# Patient Record
Sex: MALE | Race: WHITE | NOT HISPANIC OR LATINO | Employment: OTHER | URBAN - METROPOLITAN AREA
[De-identification: names, ages, dates, MRNs, and addresses within clinical notes are randomized per-mention and may not be internally consistent; named-entity substitution may affect disease eponyms.]

---

## 2023-12-14 ENCOUNTER — ANCILLARY PROCEDURE (OUTPATIENT)
Dept: RADIOLOGY | Facility: CLINIC | Age: 88
End: 2023-12-14
Payer: MEDICARE

## 2023-12-14 DIAGNOSIS — R20.2 PARESTHESIA OF SKIN: ICD-10-CM

## 2023-12-14 DIAGNOSIS — R41.3 OTHER AMNESIA: ICD-10-CM

## 2023-12-14 DIAGNOSIS — R26.89 OTHER ABNORMALITIES OF GAIT AND MOBILITY: ICD-10-CM

## 2023-12-14 DIAGNOSIS — G91.2 (IDIOPATHIC) NORMAL PRESSURE HYDROCEPHALUS (MULTI): ICD-10-CM

## 2024-01-23 DIAGNOSIS — R26.89 OTHER ABNORMALITIES OF GAIT AND MOBILITY: ICD-10-CM

## 2024-01-23 DIAGNOSIS — R41.3 OTHER AMNESIA: ICD-10-CM

## 2024-01-23 DIAGNOSIS — R20.2 PARESTHESIA OF SKIN: Primary | ICD-10-CM

## 2024-01-23 DIAGNOSIS — G91.2 (IDIOPATHIC) NORMAL PRESSURE HYDROCEPHALUS (MULTI): ICD-10-CM

## 2024-02-03 ENCOUNTER — HOSPITAL ENCOUNTER (EMERGENCY)
Facility: HOSPITAL | Age: 89
Discharge: HOME | End: 2024-02-04
Attending: EMERGENCY MEDICINE
Payer: MEDICARE

## 2024-02-03 ENCOUNTER — APPOINTMENT (OUTPATIENT)
Dept: RADIOLOGY | Facility: HOSPITAL | Age: 89
End: 2024-02-03
Payer: MEDICARE

## 2024-02-03 DIAGNOSIS — S09.90XA CLOSED HEAD INJURY, INITIAL ENCOUNTER: Primary | ICD-10-CM

## 2024-02-03 PROCEDURE — 70450 CT HEAD/BRAIN W/O DYE: CPT

## 2024-02-03 PROCEDURE — 99284 EMERGENCY DEPT VISIT MOD MDM: CPT | Mod: 25 | Performed by: EMERGENCY MEDICINE

## 2024-02-03 PROCEDURE — 70450 CT HEAD/BRAIN W/O DYE: CPT | Mod: FOREIGN READ | Performed by: RADIOLOGY

## 2024-02-03 RX ORDER — ONDANSETRON HYDROCHLORIDE 2 MG/ML
4 INJECTION, SOLUTION INTRAVENOUS ONCE
Status: DISCONTINUED | OUTPATIENT
Start: 2024-02-03 | End: 2024-02-03

## 2024-02-03 RX ORDER — ONDANSETRON 4 MG/1
4 TABLET, ORALLY DISINTEGRATING ORAL ONCE
Status: COMPLETED | OUTPATIENT
Start: 2024-02-04 | End: 2024-02-03

## 2024-02-03 RX ADMIN — ONDANSETRON 4 MG: 4 TABLET, ORALLY DISINTEGRATING ORAL at 23:57

## 2024-02-03 ASSESSMENT — PAIN - FUNCTIONAL ASSESSMENT: PAIN_FUNCTIONAL_ASSESSMENT: 0-10

## 2024-02-03 ASSESSMENT — COLUMBIA-SUICIDE SEVERITY RATING SCALE - C-SSRS
6. HAVE YOU EVER DONE ANYTHING, STARTED TO DO ANYTHING, OR PREPARED TO DO ANYTHING TO END YOUR LIFE?: NO
1. IN THE PAST MONTH, HAVE YOU WISHED YOU WERE DEAD OR WISHED YOU COULD GO TO SLEEP AND NOT WAKE UP?: NO
2. HAVE YOU ACTUALLY HAD ANY THOUGHTS OF KILLING YOURSELF?: NO

## 2024-02-03 ASSESSMENT — PAIN SCALES - GENERAL: PAINLEVEL_OUTOF10: 2

## 2024-02-03 ASSESSMENT — LIFESTYLE VARIABLES
EVER FELT BAD OR GUILTY ABOUT YOUR DRINKING: NO
EVER HAD A DRINK FIRST THING IN THE MORNING TO STEADY YOUR NERVES TO GET RID OF A HANGOVER: NO
HAVE PEOPLE ANNOYED YOU BY CRITICIZING YOUR DRINKING: NO
HAVE YOU EVER FELT YOU SHOULD CUT DOWN ON YOUR DRINKING: NO

## 2024-02-04 VITALS
OXYGEN SATURATION: 94 % | TEMPERATURE: 98.6 F | SYSTOLIC BLOOD PRESSURE: 141 MMHG | BODY MASS INDEX: 25.77 KG/M2 | DIASTOLIC BLOOD PRESSURE: 70 MMHG | HEIGHT: 70 IN | RESPIRATION RATE: 18 BRPM | WEIGHT: 180 LBS | HEART RATE: 68 BPM

## 2024-02-04 PROCEDURE — 2500000005 HC RX 250 GENERAL PHARMACY W/O HCPCS: Performed by: EMERGENCY MEDICINE

## 2024-02-04 RX ORDER — ONDANSETRON 4 MG/1
4 TABLET, ORALLY DISINTEGRATING ORAL EVERY 8 HOURS PRN
Qty: 20 TABLET | Refills: 0 | Status: SHIPPED | OUTPATIENT
Start: 2024-02-04 | End: 2024-02-11

## 2024-02-04 NOTE — CARE PLAN
"Received a message on work cell from 2:30 am from [facility] concerning this pt and concerned that the family may not have everything they need at home.  Phoned pts wife Virgen Bhatia who said that they have everything they need; she is calling Dr. Pacheco's office on Monday for a follow up appointment.  [facility] is also [facility] and he sees a physician at the [facility] facility; informed Virgen that her  may be eligible for at home services from the [facility]; she said that she has been told that in the past; encourage Virgen to call [facility] and request [facility] services for bathing ect. She verbalized understanding and said that she will call Dr[facility]co's [facility] first then the [facility].  Asked Virgen if she and Enmanuel have Children, she does have children but she does not want to bother them because they \"work so hard\", but her son who is the  POA comes over to the house.  Will call [facility] for a well check.  11:48 Phoned [facility], gave info; someone from [facility] to call back.--Also gave pts name, wifes name, address and their phone number and requested a well check.    "

## 2024-02-04 NOTE — ED PROVIDER NOTES
HPI   Chief Complaint   Patient presents with    Headache     Patient fell 2 days ago unsure if hit head but has bad headache       Patient presents the emergency department today with complaints of having nausea and vomiting as per staff from the nursing home.  The patient had fallen approximately 3 days ago, and had a minor head injury.  Patient himself does suffer from dementia is a very poor historian the present time.  The patient denies any head pain.  Patient denies neck pain.  Patient states that he just feels sick to his stomach.  Patient has no visual disturbances.  Patient has no focal weakness.  Patient has no other associated symptoms      No data recorded                Patient History   Past Medical History:   Diagnosis Date    Benign prostatic hyperplasia without lower urinary tract symptoms     Enlarged prostate    Personal history of other diseases of the musculoskeletal system and connective tissue     History of arthritis     Past Surgical History:   Procedure Laterality Date    OTHER SURGICAL HISTORY  07/22/2019    No history of surgery     No family history on file.  Social History     Tobacco Use    Smoking status: Not on file    Smokeless tobacco: Not on file   Substance Use Topics    Alcohol use: Not on file    Drug use: Not on file       Physical Exam   ED Triage Vitals [02/03/24 2329]   Temperature Heart Rate Respirations BP   37 °C (98.6 °F) 68 18 141/70      Pulse Ox Temp Source Heart Rate Source Patient Position   94 % Oral Monitor --      BP Location FiO2 (%)     -- --       Physical Exam  Vitals and nursing note reviewed.   Constitutional:       General: He is not in acute distress.     Appearance: He is well-developed.   HENT:      Head: Normocephalic and atraumatic.   Eyes:      Conjunctiva/sclera: Conjunctivae normal.   Cardiovascular:      Rate and Rhythm: Normal rate and regular rhythm.      Heart sounds: No murmur heard.  Pulmonary:      Effort: Pulmonary effort is normal. No  respiratory distress.      Breath sounds: Normal breath sounds.   Abdominal:      Palpations: Abdomen is soft.      Tenderness: There is no abdominal tenderness.   Musculoskeletal:         General: No swelling.      Cervical back: Neck supple.   Skin:     General: Skin is warm and dry.      Capillary Refill: Capillary refill takes less than 2 seconds.   Neurological:      Mental Status: He is alert.   Psychiatric:         Mood and Affect: Mood normal.         ED Course & [facility]   ED Course as of 02/04/24 0047   Sun Feb 04, 2024 0047 Patient does not have any significant maladies on the CT scan, so I do feel the patient can be safely discharged and sent back to his nursing home. [FR]      ED Course User Index  [FR] Fredrick Garza DO         Diagnoses as of 02/04/24 0047   Closed head injury, initial encounter       Medical Decision Making  After my initial evaluation, the patient will go for CT scan of the brain.  The patient will also be given medication to try to help with his nausea.  The patient otherwise has a very benign physical examination.    Amount and/or Complexity of Data Reviewed  Radiology:  Decision-making details documented in ED Course.  ECG/medicine tests:  Decision-making details documented in ED Course.        Procedure  Procedures     Fredrick Garza DO  02/04/24 0048

## 2024-02-08 ENCOUNTER — LAB (OUTPATIENT)
Dept: LAB | Facility: LAB | Age: 89
End: 2024-02-08
Payer: MEDICARE

## 2024-02-08 DIAGNOSIS — R41.3 OTHER AMNESIA: ICD-10-CM

## 2024-02-08 DIAGNOSIS — G91.2 (IDIOPATHIC) NORMAL PRESSURE HYDROCEPHALUS (MULTI): ICD-10-CM

## 2024-02-08 DIAGNOSIS — R20.2 PARESTHESIA OF SKIN: ICD-10-CM

## 2024-02-08 DIAGNOSIS — R26.89 OTHER ABNORMALITIES OF GAIT AND MOBILITY: ICD-10-CM

## 2024-02-08 LAB
CK SERPL-CCNC: 43 U/L (ref 24–195)
CRP SERPL-MCNC: <0.3 MG/DL (ref 0–2)
ERYTHROCYTE [SEDIMENTATION RATE] IN BLOOD BY WESTERGREN METHOD: <1 MM/H (ref 0–20)
VIT B12 SERPL-MCNC: 361 PG/ML (ref 211–946)

## 2024-02-08 PROCEDURE — 85652 RBC SED RATE AUTOMATED: CPT

## 2024-02-08 PROCEDURE — 82550 ASSAY OF CK (CPK): CPT

## 2024-02-08 PROCEDURE — 86140 C-REACTIVE PROTEIN: CPT

## 2024-02-08 PROCEDURE — 82607 VITAMIN B-12: CPT

## 2024-02-08 PROCEDURE — 36415 COLL VENOUS BLD VENIPUNCTURE: CPT

## 2024-02-13 ENCOUNTER — HOSPITAL ENCOUNTER (OUTPATIENT)
Dept: RADIOLOGY | Facility: HOSPITAL | Age: 89
Discharge: HOME | End: 2024-02-13
Payer: MEDICARE

## 2024-02-13 PROCEDURE — 70551 MRI BRAIN STEM W/O DYE: CPT

## 2024-02-22 ENCOUNTER — APPOINTMENT (OUTPATIENT)
Dept: RADIOLOGY | Facility: HOSPITAL | Age: 89
End: 2024-02-22
Payer: MEDICARE

## 2024-03-20 ENCOUNTER — APPOINTMENT (OUTPATIENT)
Dept: CARDIOLOGY | Facility: CLINIC | Age: 89
End: 2024-03-20
Payer: MEDICARE

## 2024-04-29 ENCOUNTER — APPOINTMENT (OUTPATIENT)
Dept: CARDIOLOGY | Facility: CLINIC | Age: 89
End: 2024-04-29
Payer: MEDICARE

## 2024-06-24 ENCOUNTER — EVALUATION (OUTPATIENT)
Dept: PHYSICAL THERAPY | Facility: CLINIC | Age: 89
End: 2024-06-24
Payer: MEDICARE

## 2024-06-24 ENCOUNTER — TELEPHONE (OUTPATIENT)
Dept: PHYSICAL THERAPY | Facility: CLINIC | Age: 89
End: 2024-06-24
Payer: MEDICARE

## 2024-06-24 DIAGNOSIS — R26.0 ATAXIC GAIT: ICD-10-CM

## 2024-06-24 PROCEDURE — 97162 PT EVAL MOD COMPLEX 30 MIN: CPT | Mod: GP | Performed by: PHYSICAL THERAPIST

## 2024-06-24 ASSESSMENT — ENCOUNTER SYMPTOMS
OCCASIONAL FEELINGS OF UNSTEADINESS: 1
DEPRESSION: 0
LOSS OF SENSATION IN FEET: 0

## 2024-06-24 NOTE — PROGRESS NOTES
"Physical Therapy Evaluation and Treatment      Patient Name: Enmanuel Bhatia  MRN: [id number]  Today's Date: 6/24/2024  Time Calculation  Start Time: 1644  Stop Time: 1720  Time Calculation (min): 36 min      Insurance:  Visit number: 1 of 10  Authorization info: EVAL only   Insurance Type: Payor: [employer] / Plan: [employer] MEDICARE ADVANTAGE / Product Type: *No Product type* /     Current Problem:   1. Ataxic gait  Referral to Physical Therapy    Follow Up In Physical Therapy          Subjective    General:  Pt is familiar to therapist and returns to therapy for similar issue. He presents with spouse. He has supposedly gone down hill with his walking. He reports not walking much, only a little bit in the house. Finds he is fatigue out a lot more. Contributes everything to \"old age.\" Currently using transport chair for most transportation. Has rollator. Does not do exercises at home. Dementia with poor historian. Had stay in SNF during winter. Spouse does most tasks for patient.       Precautions: Falls  Pain: 0/10      Objective   ROM   Bilateral LE AROM: WNL, but high level of difficulty with bilateral hip flexion      MMT   Bilateral LE strength:  Hip flex 4-/5  Knee flex 4-/5  Knee ext 4-/5      Palpation   No tenderness    Flexibility   Significant tightness to bilateral hip flexors and hamstrings      Transfers   Requires heavy bilateral UE support for sit to stand     Gait   Ambulates with decreased william using rollator at min A. Only able to ambulate 10 feet. Significant unsteadiness. Minimal bilateral hip flexion and foot clearance. Shuffling gait pattern.     Stairs   Unable to perform in clinic.     Balance   ROMBERG: unable to perform safely    Outcome Measures:  LEFS: 6/80    Treatments:  Therapeutic Exercise:  Access Code: [id number]   LAQ  Seated march  Seated heel raise      Assessment   Assessment:   Pt is a 89 y.o. male with ataxia and difficulty walking. Pt with significant gait deficits, " impaired balance, decreased strength, abnormal posture, reduced endurance, and flexibility restrictions. Pt will benefit from skilled PT to address the above deficits for improvement in functional activities.     *At this time I highly suggest patient would benefit from home physical therapy. Pt spouse would like to bring him outpatient however.*      Moderate complexity due to patient's clinical presentation being evolving with changing characteristics, with comorbidities/complexities to include hydrocephaleus, dementia, OA, all of which may negatively impact rehab tolerance and progression.     Plan:   Treatment/Interventions: Education/ Instruction, Gait training, Manual therapy, Neuromuscular re-education, Self care/ home management, Therapeutic activities, Therapeutic exercises  PT Plan: Skilled PT  PT Frequency: 1 time per week  Duration: up to 9 more visits  Number of Treatments Authorized: EVAL only  Rehab Potential: Poor  Plan of Care Agreement: Patient, Other (comment) (spouse)      Goals:   Active       Mobility       Goal 1       Start:  06/24/24    Expected End:  09/22/24       Pt will improve bilat LE strength to 4+/5 to improve ADLs.         Goal 2       Start:  06/24/24    Expected End:  09/22/24       Pt will ambulate 40 feet with rollator at mod I safely to improve ADLs.         Goal 3       Start:  06/24/24    Expected End:  09/22/24       Pt will perform ROMBERG for 60 seconds to improve balance and reduce risk of falls.

## 2024-07-11 ENCOUNTER — TREATMENT (OUTPATIENT)
Dept: PHYSICAL THERAPY | Facility: CLINIC | Age: 89
End: 2024-07-11
Payer: MEDICARE

## 2024-07-11 DIAGNOSIS — R26.0 ATAXIC GAIT: ICD-10-CM

## 2024-07-11 ASSESSMENT — PAIN SCALES - GENERAL: PAINLEVEL_OUTOF10: 0 - NO PAIN

## 2024-07-11 NOTE — PROGRESS NOTES
"Physical Therapy Treatment    Patient Name: Enmanuel Bhatia  MRN: [id number]  Today's Date: 7/11/2024  Time Calculation  Start Time: 1515  Stop Time: 1600  Time Calculation (min): 45 min    Insurance:  Visit number: 2 of 10  Authorization info: EVAL ONLY - [employer] - AUTH THRU [employer] / $35 COPAY / OOP $4200 - $615 met   Insurance Type: Payor: [employer] MEDICARE / Plan: [facility] MEDICARE ADVANTAGE / Product Type: *No Product type* /     Current Problem   1. Ataxic gait  Follow Up In Physical Therapy          Subjective   Pt reports fatigue during standing ther ex.    General   Reason for Referral: Ataxic gait  Referred By: Grant Pacheco MD  Precautions:  Precautions  Precautions Comment: Falls risk  Pain   0-10 (Numeric) Pain Score: 0 - No pain  Post Treatment Pain Level 0/10    Objective   Pt requires intermittent to frequent UE support during standing there ex, frequent cues for exercise performance, unsteady gait with rollater.    Treatments:  Therapeutic Exercise:  Therapeutic Exercise  Therapeutic Exercise Performed: Yes  Therapeutic Exercise Activity 1: Nustep L4, 8'  Therapeutic Exercise Activity 2: Standing PF 2x10  Therapeutic Exercise Activity 3: Standing DF 2x10  Therapeutic Exercise Activity 4: Minisquats 2x10  Therapeutic Exercise Activity 5: Sidestepping in // bars 3 laps  Therapeutic Exercise Activity 6: Standing Hip flexion with PTB above knees 2x10 L/R each  Therapeutic Exercise Activity 7: Standing hip extension with PTB above knees 2x10 L/R each  Therapeutic Exercise Activity 8: Standing abduction with PTB above knees 2x10 L/R each  Therapeutic Exercise Activity 9: Seated hip flexor stretch 20\"x3 L/R each    Gait:  Ambulation/Gait Training  Ambulation/Gait Training Performed: Yes  Ambulation/Gait Training 1  Surface 1: Level tile  Device 1: Rollator  Gait Support Devices: Gait belt  Assistance 1: Contact guard  Quality of Gait 1: Forward flexed posture, Listing, Decreased step length, Diminished heel " strike       Assessment   Assessment:   PT Assessment  PT Assessment Results: Decreased strength, Decreased range of motion, Decreased endurance, Impaired balance, Decreased mobility, Decreased coordination, Impaired judgement, Decreased safety awareness, Decreased cognition  Rehab Prognosis: Poor  Evaluation/Treatment Tolerance: Patient limited by fatigue  Assessment Comment: Pt challenged with standing ther ex activites, unsteady with intermittent to frequent UE support.    Plan:   OP PT Plan  Treatment/Interventions: Education/ Instruction, Gait training, Manual therapy, Neuromuscular re-education, Self care/ home management, Therapeutic activities, Therapeutic exercises  PT Plan: Skilled PT  PT Frequency: 1 time per week  Duration: up to 9 more visits  Number of Treatments Authorized: EVAL only  Rehab Potential: Poor  Plan of Care Agreement: Patient, Other (comment) (spouse)    OP EDUCATION:  Outpatient Education  Individual(s) Educated: Patient, Spouse  Education Provided: Body Mechanics, Home Exercise Program  Patient/Caregiver Demonstrated Understanding: yes  Patient Response to Education: Patient/Caregiver Verbalized Understanding of Information    Goals:   Active       Mobility       Goal 1       Start:  06/24/24    Expected End:  09/22/24       Pt will improve bilat LE strength to 4+/5 to improve ADLs.         Goal 2       Start:  06/24/24    Expected End:  09/22/24       Pt will ambulate 40 feet with rollator at mod I safely to improve ADLs.         Goal 3       Start:  06/24/24    Expected End:  09/22/24       Pt will perform ROMBERG for 60 seconds to improve balance and reduce risk of falls.

## 2024-07-17 ENCOUNTER — APPOINTMENT (OUTPATIENT)
Dept: PHYSICAL THERAPY | Facility: CLINIC | Age: 89
End: 2024-07-17
Payer: MEDICARE

## 2024-07-17 ENCOUNTER — DOCUMENTATION (OUTPATIENT)
Dept: PHYSICAL THERAPY | Facility: CLINIC | Age: 89
End: 2024-07-17
Payer: MEDICARE

## 2024-07-17 NOTE — PROGRESS NOTES
Physical Therapy                 Therapy Communication Note    Patient Name: Enmanuel Bhatia  MRN: [id number]  Today's Date: 7/17/2024     Discipline: Physical Therapy    Missed Visit Reason:      Missed Time: Cancel    Comment: Pt cancels appt.

## 2024-07-24 ENCOUNTER — APPOINTMENT (OUTPATIENT)
Dept: PHYSICAL THERAPY | Facility: CLINIC | Age: 89
End: 2024-07-24
Payer: MEDICARE

## 2024-07-24 ENCOUNTER — DOCUMENTATION (OUTPATIENT)
Dept: PHYSICAL THERAPY | Facility: CLINIC | Age: 89
End: 2024-07-24
Payer: MEDICARE

## 2024-07-24 NOTE — PROGRESS NOTES
Physical Therapy                 Therapy Communication Note    Patient Name: Enmanuel Bhatia  MRN: [id number]  Today's Date: 7/24/2024     Discipline: Physical Therapy    Missed Visit Reason:      Missed Time: Cancel    Comment: Pt came yesterday with spouse. Spouse attempted to get patient out of house and to come today but reported having difficulty with managing due to weather.

## 2024-07-31 ENCOUNTER — DOCUMENTATION (OUTPATIENT)
Dept: PHYSICAL THERAPY | Facility: CLINIC | Age: 89
End: 2024-07-31
Payer: MEDICARE

## 2024-07-31 ENCOUNTER — APPOINTMENT (OUTPATIENT)
Dept: PHYSICAL THERAPY | Facility: CLINIC | Age: 89
End: 2024-07-31
Payer: MEDICARE

## 2024-09-25 ENCOUNTER — DOCUMENTATION (OUTPATIENT)
Dept: PHYSICAL THERAPY | Facility: CLINIC | Age: 89
End: 2024-09-25
Payer: MEDICARE

## 2024-09-25 NOTE — PROGRESS NOTES
Physical Therapy    Discharge Summary    Name: Enmanuel Bhatia  MRN: [id number]  : 1935  Date: 24    Discharge Summary: PT    Discharge Information: Date of evaluation 2024 and Number of attended visits 2    Therapy Summary: Pt only attended one follow-up visit after initial eval.    Discharge Status: Return to MD     Rehab Discharge Reason: Failed to schedule and/or keep follow-up appointment(s)

## 2025-01-21 ENCOUNTER — HOME HEALTH ADMISSION (OUTPATIENT)
Dept: HOME HEALTH SERVICES | Facility: HOME HEALTH | Age: OVER 89
End: 2025-01-21
Payer: MEDICARE

## 2025-01-21 ENCOUNTER — DOCUMENTATION (OUTPATIENT)
Dept: HOME HEALTH SERVICES | Facility: HOME HEALTH | Age: OVER 89
End: 2025-01-21
Payer: MEDICARE

## 2025-01-21 NOTE — HH CARE COORDINATION
Home Care received a Referral for Physical Therapy. We have processed the referral for a Start of Care on 1/17/25.     If you have any questions or concerns, please feel free to contact us at [phone]. Follow the prompts, enter your five digit zip code, and you will be directed to your care team on [facility].

## 2025-01-23 ENCOUNTER — HOME CARE VISIT (OUTPATIENT)
Dept: HOME HEALTH SERVICES | Facility: HOME HEALTH | Age: OVER 89
End: 2025-01-23
Payer: MEDICARE

## 2025-01-23 VITALS — HEART RATE: 88 BPM | DIASTOLIC BLOOD PRESSURE: 58 MMHG | TEMPERATURE: 98.4 F | SYSTOLIC BLOOD PRESSURE: 114 MMHG

## 2025-01-23 PROCEDURE — 169592 NO-PAY CLAIM PROCEDURE

## 2025-01-23 PROCEDURE — G0151 HHCP-SERV OF PT,EA 15 MIN: HCPCS | Mod: HHH

## 2025-01-23 SDOH — HEALTH STABILITY: PHYSICAL HEALTH: EXERCISE COMMENTS: RECLINED AP HEEL SLIDES HIP ABD BRIDGING  SITTING MARCHING LAQ X 5 TO 10

## 2025-01-23 ASSESSMENT — ENCOUNTER SYMPTOMS
LOSS OF SENSATION IN FEET: 0
PAIN: 1
DEPRESSION: 1
OCCASIONAL FEELINGS OF UNSTEADINESS: 0

## 2025-01-23 ASSESSMENT — ACTIVITIES OF DAILY LIVING (ADL)
ENTERING_EXITING_HOME: MINIMUM ASSIST
AMBULATION_DISTANCE/DURATION_TOLERATED: 30 FEET
OASIS_M1830: 05

## 2025-01-29 ENCOUNTER — HOME CARE VISIT (OUTPATIENT)
Dept: HOME HEALTH SERVICES | Facility: HOME HEALTH | Age: OVER 89
End: 2025-01-29
Payer: MEDICARE

## 2025-01-30 ENCOUNTER — HOME CARE VISIT (OUTPATIENT)
Dept: HOME HEALTH SERVICES | Facility: HOME HEALTH | Age: OVER 89
End: 2025-01-30
Payer: MEDICARE

## 2025-01-30 VITALS — DIASTOLIC BLOOD PRESSURE: 68 MMHG | TEMPERATURE: 98.5 F | SYSTOLIC BLOOD PRESSURE: 112 MMHG

## 2025-01-30 PROCEDURE — G0151 HHCP-SERV OF PT,EA 15 MIN: HCPCS | Mod: HHH

## 2025-01-30 ASSESSMENT — ENCOUNTER SYMPTOMS: DENIES PAIN: 1

## 2025-02-03 ENCOUNTER — HOSPITAL ENCOUNTER (INPATIENT)
Facility: HOSPITAL | Age: OVER 89
LOS: 1 days | Discharge: NURSING, REHAB OR LONG-TERM CARE | End: 2025-02-08
Attending: EMERGENCY MEDICINE | Admitting: INTERNAL MEDICINE
Payer: MEDICARE

## 2025-02-03 ENCOUNTER — APPOINTMENT (OUTPATIENT)
Dept: RADIOLOGY | Facility: HOSPITAL | Age: OVER 89
End: 2025-02-03
Payer: MEDICARE

## 2025-02-03 ENCOUNTER — APPOINTMENT (OUTPATIENT)
Dept: CARDIOLOGY | Facility: HOSPITAL | Age: OVER 89
End: 2025-02-03
Payer: MEDICARE

## 2025-02-03 ENCOUNTER — HOME CARE VISIT (OUTPATIENT)
Dept: HOME HEALTH SERVICES | Facility: HOME HEALTH | Age: OVER 89
End: 2025-02-03
Payer: MEDICARE

## 2025-02-03 VITALS — HEART RATE: 102 BPM | SYSTOLIC BLOOD PRESSURE: 108 MMHG | TEMPERATURE: 98.9 F | DIASTOLIC BLOOD PRESSURE: 70 MMHG

## 2025-02-03 DIAGNOSIS — R26.81 UNSTEADINESS: ICD-10-CM

## 2025-02-03 DIAGNOSIS — R29.6 RECURRENT FALLS: Primary | ICD-10-CM

## 2025-02-03 PROBLEM — R41.3 MEMORY IMPAIRMENT: Status: ACTIVE | Noted: 2025-02-03

## 2025-02-03 PROBLEM — N40.1 BENIGN PROSTATIC HYPERPLASIA WITH LOWER URINARY TRACT SYMPTOMS: Status: ACTIVE | Noted: 2025-02-03

## 2025-02-03 PROBLEM — Z86.59 HISTORY OF DEPRESSION: Status: ACTIVE | Noted: 2025-02-03
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
## 2025-02-03 PROBLEM — R01.1 MURMUR, CARDIAC: Status: ACTIVE | Noted: 2025-02-03

## 2025-02-03 PROBLEM — N39.46 MIXED STRESS AND URGE URINARY INCONTINENCE: Status: ACTIVE | Noted: 2025-02-03

## 2025-02-03 LAB
ALBUMIN SERPL BCP-MCNC: 3.8 G/DL (ref 3.4–5)
ALP SERPL-CCNC: 73 U/L (ref 33–136)
ALT SERPL W P-5'-P-CCNC: 11 U/L (ref 10–52)
ANION GAP SERPL CALCULATED.3IONS-SCNC: 9 MMOL/L (ref 10–20)
APPEARANCE UR: CLEAR
AST SERPL W P-5'-P-CCNC: 14 U/L (ref 9–39)
BACTERIA #/AREA URNS AUTO: ABNORMAL /HPF
BASOPHILS # BLD AUTO: 0.05 X10*3/UL (ref 0–0.1)
BASOPHILS NFR BLD AUTO: 0.8 %
BILIRUB SERPL-MCNC: 0.5 MG/DL (ref 0–1.2)
BILIRUB UR STRIP.AUTO-MCNC: NEGATIVE MG/DL
BUN SERPL-MCNC: 12 MG/DL (ref 6–23)
CALCIUM SERPL-MCNC: 9.1 MG/DL (ref 8.6–10.3)
CARDIAC TROPONIN I PNL SERPL HS: 5 NG/L (ref 0–20)
CARDIAC TROPONIN I PNL SERPL HS: 6 NG/L (ref 0–20)
CHLORIDE SERPL-SCNC: 101 MMOL/L (ref 98–107)
CK SERPL-CCNC: 72 U/L (ref 0–325)
CO2 SERPL-SCNC: 29 MMOL/L (ref 21–32)
COLOR UR: ABNORMAL
CREAT SERPL-MCNC: 1 MG/DL (ref 0.5–1.3)
EGFRCR SERPLBLD CKD-EPI 2021: 72 ML/MIN/1.73M*2
EOSINOPHIL # BLD AUTO: 0.19 X10*3/UL (ref 0–0.4)
EOSINOPHIL NFR BLD AUTO: 3 %
ERYTHROCYTE [DISTWIDTH] IN BLOOD BY AUTOMATED COUNT: 12.9 % (ref 11.5–14.5)
GLUCOSE SERPL-MCNC: 98 MG/DL (ref 74–99)
GLUCOSE UR STRIP.AUTO-MCNC: NORMAL MG/DL
HCT VFR BLD AUTO: 37.6 % (ref 41–52)
HGB BLD-MCNC: 13 G/DL (ref 13.5–17.5)
HOLD SPECIMEN: NORMAL
IMM GRANULOCYTES # BLD AUTO: 0.04 X10*3/UL (ref 0–0.5)
IMM GRANULOCYTES NFR BLD AUTO: 0.6 % (ref 0–0.9)
KETONES UR STRIP.AUTO-MCNC: NEGATIVE MG/DL
LEUKOCYTE ESTERASE UR QL STRIP.AUTO: ABNORMAL
LYMPHOCYTES # BLD AUTO: 1.31 X10*3/UL (ref 0.8–3)
LYMPHOCYTES NFR BLD AUTO: 21 %
MAGNESIUM SERPL-MCNC: 1.82 MG/DL (ref 1.6–2.4)
MCH RBC QN AUTO: 31.5 PG (ref 26–34)
MCHC RBC AUTO-ENTMCNC: 34.6 G/DL (ref 32–36)
MCV RBC AUTO: 91 FL (ref 80–100)
MONOCYTES # BLD AUTO: 0.46 X10*3/UL (ref 0.05–0.8)
MONOCYTES NFR BLD AUTO: 7.4 %
NEUTROPHILS # BLD AUTO: 4.18 X10*3/UL (ref 1.6–5.5)
NEUTROPHILS NFR BLD AUTO: 67.2 %
NITRITE UR QL STRIP.AUTO: NEGATIVE
NRBC BLD-RTO: 0 /100 WBCS (ref 0–0)
PH UR STRIP.AUTO: 6.5 [PH]
PLATELET # BLD AUTO: 225 X10*3/UL (ref 150–450)
POTASSIUM SERPL-SCNC: 4.1 MMOL/L (ref 3.5–5.3)
PROT SERPL-MCNC: 6 G/DL (ref 6.4–8.2)
PROT UR STRIP.AUTO-MCNC: NEGATIVE MG/DL
RBC # BLD AUTO: 4.13 X10*6/UL (ref 4.5–5.9)
RBC # UR STRIP.AUTO: NEGATIVE /UL
RBC #/AREA URNS AUTO: ABNORMAL /HPF
SODIUM SERPL-SCNC: 135 MMOL/L (ref 136–145)
SP GR UR STRIP.AUTO: 1.01
UROBILINOGEN UR STRIP.AUTO-MCNC: NORMAL MG/DL
WBC # BLD AUTO: 6.2 X10*3/UL (ref 4.4–11.3)
WBC #/AREA URNS AUTO: ABNORMAL /HPF

## 2025-02-03 PROCEDURE — 99285 EMERGENCY DEPT VISIT HI MDM: CPT | Mod: 25 | Performed by: EMERGENCY MEDICINE

## 2025-02-03 PROCEDURE — 99223 1ST HOSP IP/OBS HIGH 75: CPT | Performed by: NURSE PRACTITIONER

## 2025-02-03 PROCEDURE — 70450 CT HEAD/BRAIN W/O DYE: CPT

## 2025-02-03 PROCEDURE — 72125 CT NECK SPINE W/O DYE: CPT

## 2025-02-03 PROCEDURE — 81001 URINALYSIS AUTO W/SCOPE: CPT | Performed by: PHYSICIAN ASSISTANT

## 2025-02-03 PROCEDURE — 72170 X-RAY EXAM OF PELVIS: CPT | Performed by: RADIOLOGY

## 2025-02-03 PROCEDURE — 70450 CT HEAD/BRAIN W/O DYE: CPT | Performed by: RADIOLOGY

## 2025-02-03 PROCEDURE — G0151 HHCP-SERV OF PT,EA 15 MIN: HCPCS | Mod: HHH

## 2025-02-03 PROCEDURE — 71045 X-RAY EXAM CHEST 1 VIEW: CPT

## 2025-02-03 PROCEDURE — 85025 COMPLETE CBC W/AUTO DIFF WBC: CPT | Performed by: PHYSICIAN ASSISTANT

## 2025-02-03 PROCEDURE — 93005 ELECTROCARDIOGRAM TRACING: CPT

## 2025-02-03 PROCEDURE — 36415 COLL VENOUS BLD VENIPUNCTURE: CPT | Performed by: PHYSICIAN ASSISTANT

## 2025-02-03 PROCEDURE — 83735 ASSAY OF MAGNESIUM: CPT | Performed by: PHYSICIAN ASSISTANT

## 2025-02-03 PROCEDURE — 84484 ASSAY OF TROPONIN QUANT: CPT | Performed by: PHYSICIAN ASSISTANT

## 2025-02-03 PROCEDURE — 72125 CT NECK SPINE W/O DYE: CPT | Performed by: RADIOLOGY

## 2025-02-03 PROCEDURE — 87086 URINE CULTURE/COLONY COUNT: CPT | Mod: WESLAB | Performed by: PHYSICIAN ASSISTANT

## 2025-02-03 PROCEDURE — 82550 ASSAY OF CK (CPK): CPT | Performed by: PHYSICIAN ASSISTANT

## 2025-02-03 PROCEDURE — 72170 X-RAY EXAM OF PELVIS: CPT

## 2025-02-03 PROCEDURE — 80053 COMPREHEN METABOLIC PANEL: CPT | Performed by: PHYSICIAN ASSISTANT

## 2025-02-03 PROCEDURE — 71045 X-RAY EXAM CHEST 1 VIEW: CPT | Performed by: RADIOLOGY

## 2025-02-03 PROCEDURE — G0378 HOSPITAL OBSERVATION PER HR: HCPCS

## 2025-02-03 RX ORDER — NYSTATIN 100000 U/G
1 CREAM TOPICAL 2 TIMES DAILY
COMMUNITY

## 2025-02-03 RX ORDER — VIT C/E/ZN/COPPR/LUTEIN/ZEAXAN 250MG-90MG
125 CAPSULE ORAL DAILY
COMMUNITY

## 2025-02-03 RX ORDER — FLUTICASONE PROPIONATE 50 MCG
1 SPRAY, SUSPENSION (ML) NASAL DAILY
COMMUNITY

## 2025-02-03 RX ORDER — GABAPENTIN 300 MG/1
300 CAPSULE ORAL NIGHTLY
COMMUNITY

## 2025-02-03 ASSESSMENT — COLUMBIA-SUICIDE SEVERITY RATING SCALE - C-SSRS
2. HAVE YOU ACTUALLY HAD ANY THOUGHTS OF KILLING YOURSELF?: NO
6. HAVE YOU EVER DONE ANYTHING, STARTED TO DO ANYTHING, OR PREPARED TO DO ANYTHING TO END YOUR LIFE?: NO
1. IN THE PAST MONTH, HAVE YOU WISHED YOU WERE DEAD OR WISHED YOU COULD GO TO SLEEP AND NOT WAKE UP?: NO

## 2025-02-03 ASSESSMENT — ENCOUNTER SYMPTOMS: DENIES PAIN: 1

## 2025-02-03 NOTE — ED TRIAGE NOTES
After home health aide left house for the night patient tried to ambulate and fell, lives at home with wife and is concern that patient will fall again and he is unable to care for himself.

## 2025-02-03 NOTE — ED PROVIDER NOTES
HPI   Chief Complaint   Patient presents with    Fall       HPI    Patient is an 89-year-old male presenting from home via EMS for evaluation after a fall.  According to EMS, patient does have physical therapy and home health aide come to his house to aid with his ambulation as he has known difficulties with ambulation.  EMS stated that today the patient fell after his PT appointment and his wife was unable to get him up off of the ground.  On questioning, the patient does not believe that he hit his head on his fall.  He denies loss of consciousness or blood thinner use.  He denies any complaints of pain, including chest pain, shortness of breath, abdominal pain, nausea, vomiting, diarrhea or constipation.  No dysuria or hematuria.    Patient History   Past Medical History:   Diagnosis Date    Benign prostatic hyperplasia without lower urinary tract symptoms     Enlarged prostate    Personal history of other diseases of the musculoskeletal system and connective tissue     History of arthritis     Past Surgical History:   Procedure Laterality Date    OTHER SURGICAL HISTORY  07/22/2019    No history of surgery     No family history on file.  Social History     Tobacco Use    Smoking status: Not on file    Smokeless tobacco: Not on file   Substance Use Topics    Alcohol use: Not on file    Drug use: Not on file       Physical Exam   ED Triage Vitals   Temp Pulse Resp BP   -- -- -- --      SpO2 Temp src Heart Rate Source Patient Position   -- -- -- --      BP Location FiO2 (%)     -- --       Physical Exam  Vitals and nursing note reviewed.   Constitutional:       Appearance: Normal appearance.   HENT:      Head: Normocephalic and atraumatic.   Eyes:      Extraocular Movements: Extraocular movements intact.      Pupils: Pupils are equal, round, and reactive to light.   Neck:      Comments: No C-spine tenderness, palpable deformities or step-offs.  Cardiovascular:      Rate and Rhythm: Normal rate and regular rhythm.    Pulmonary:      Effort: Pulmonary effort is normal.      Breath sounds: Normal breath sounds.   Abdominal:      General: Abdomen is flat. Bowel sounds are normal.      Palpations: Abdomen is soft.   Musculoskeletal:         General: No swelling, tenderness, deformity or signs of injury.      Cervical back: Normal range of motion and neck supple.   Skin:     General: Skin is warm and dry.   Neurological:      Mental Status: He is alert.           ED Course & MDM   ED Course as of 02/03/25 2121 Mon Feb 03, 2025 1811 EKG personally interpreted by me performed at 1715  Normal sinus rhythm with left axis deviation ventricular 88 normal intervals no acute ischemic changes [EF]      ED Course User Index  [EF] Haley Santiago, DO         Diagnoses as of 02/03/25 2121   Recurrent falls   Unsteadiness                 No data recorded     [place] Coma Scale Score: 15 (02/03/25 1704 : Latosha Wiggins RN)                           Medical Decision Making  Parts of this chart have been completed using voice recognition software. Please excuse any errors of transcription. Despite the medical decision making time stamp above-my medical decision making has taken place during the patient's entire visit. My thought process and reason for plan has been formulated from the time that I saw the patient until the time of disposition and is not specific to one specific moment during their visit and furthermore my MDM encompasses this entire chart and not only this text box.      HPI: Detailed above.    Exam: A medically appropriate exam performed, outlined above, given the known history and presentation.    History obtained from: Patient    Social Determinants of Health considered during this visit: Lives at home with wife    EKG interpreted by my attending physician, reviewed by myself.    Labs Reviewed   CBC WITH AUTO DIFFERENTIAL - Abnormal       Result Value    WBC 6.2      nRBC 0.0      RBC 4.13 (*)     Hemoglobin 13.0 (*)      Hematocrit 37.6 (*)     MCV 91      MCH 31.5      MCHC 34.6      RDW 12.9      Platelets 225      Neutrophils % 67.2      Immature Granulocytes %, Automated 0.6      Lymphocytes % 21.0      Monocytes % 7.4      Eosinophils % 3.0      Basophils % 0.8      Neutrophils Absolute 4.18      Immature Granulocytes Absolute, Automated 0.04      Lymphocytes Absolute 1.31      Monocytes Absolute 0.46      Eosinophils Absolute 0.19      Basophils Absolute 0.05     COMPREHENSIVE METABOLIC PANEL - Abnormal    Glucose 98      Sodium 135 (*)     Potassium 4.1      Chloride 101      Bicarbonate 29      Anion Gap 9 (*)     Urea Nitrogen 12      Creatinine 1.00      eGFR 72      Calcium 9.1      Albumin 3.8      Alkaline Phosphatase 73      Total Protein 6.0 (*)     AST 14      Bilirubin, Total 0.5      ALT 11     URINALYSIS WITH REFLEX CULTURE AND MICROSCOPIC - Abnormal    Color, Urine Light-Yellow      Appearance, Urine Clear      Specific Gravity, Urine 1.006      pH, Urine 6.5      Protein, Urine NEGATIVE      Glucose, Urine Normal      Blood, Urine NEGATIVE      Ketones, Urine NEGATIVE      Bilirubin, Urine NEGATIVE      Urobilinogen, Urine Normal      Nitrite, Urine NEGATIVE      Leukocyte Esterase, Urine 75 Manjinder/uL (*)    MICROSCOPIC ONLY, URINE - Abnormal    WBC, Urine 6-10 (*)     RBC, Urine NONE      Bacteria, Urine 1+ (*)    MAGNESIUM - Normal    Magnesium 1.82     CREATINE KINASE - Normal    Creatine Kinase 72     SERIAL TROPONIN-INITIAL - Normal    Troponin I, High Sensitivity 5      Narrative:     Less than 99th percentile of normal range cutoff-  Female and children under 18 years old <14 ng/L; Male <21 ng/L: Negative  Repeat testing should be performed if clinically indicated.     Female and children under 18 years old 14-50 ng/L; Male 21-50 ng/L:  Consistent with possible cardiac damage and possible increased clinical   risk. Serial measurements may help to assess extent of myocardial damage.     >50 ng/L: Consistent  with cardiac damage, increased clinical risk and  myocardial infarction. Serial measurements may help assess extent of   myocardial damage.      NOTE: Children less than 1 year old may have higher baseline troponin   levels and results should be interpreted in conjunction with the overall   clinical context.     NOTE: Troponin I testing is performed using a different   testing methodology at [facility] than at other   [facility]. Direct result comparisons should only   be made within the same method.   URINE CULTURE   TROPONIN SERIES- (INITIAL, 1 HR)    Narrative:     The following orders were created for panel order Troponin Series, (0, 1 HR).  Procedure                               Abnormality         Status                     ---------                               -----------         ------                     Troponin I, High Sensiti...[[id number]]  Normal              Final result               Troponin, High Sensitivi...[[id number]]                                                   Please view results for these tests on the individual orders.   URINALYSIS WITH REFLEX CULTURE AND MICROSCOPIC    Narrative:     The following orders were created for panel order Urinalysis with Reflex Culture and Microscopic.  Procedure                               Abnormality         Status                     ---------                               -----------         ------                     Urinalysis with Reflex C...[[id number]]  Abnormal            Final result               Extra Urine Gray Tube[[id number]]                            In process                   Please view results for these tests on the individual orders.   EXTRA URINE GRAY TUBE   SERIAL TROPONIN, 1 HOUR     CT head wo IV contrast   Final Result   No CT evidence of acute intracranial injury.                  MACRO:   None             Signed by: Derick Dallas 2/3/2025 6:33 PM   Dictation workstation:   [id number]      CT cervical  spine wo IV contrast   Final Result   Multilevel spondylosis without acute osseous abnormality of the   cervical spine.        MACRO:   None        Signed by: Derick Dallas 2/3/2025 6:35 PM   Dictation workstation:   [id number]      XR pelvis 1-2 views   Final Result   No acute abnormality in the pelvis.        MACRO:   None        Signed by: Kailash Haley 2/3/2025 5:59 PM   Dictation workstation:   [id number]      XR chest 1 view   Final Result   1.  No evidence of acute cardiopulmonary process.                  MACRO:   None        Signed by: Kailash Haley 2/3/2025 5:59 PM   Dictation workstation:   [id number]        Medications - No data to display    Differential diagnoses considered for this visit include: Acute intracranial processes versus cervical spine instability versus failure to thrive versus rhabdomyolysis versus urinary tract infection versus electrolyte imbalance versus metabolic disturbance    Considerations/further MDM:    Patient is an 89-year-old male coming from home for evaluation after a mechanical fall when his wife could not get him up off of the floor.  Vital signs are hemodynamically stable.  On physical exam, patient is spontaneously moving all of his extremities in the bed and is not complaining of any pain.  There are no neurologic deficits or outward signs of trauma.  Patient did appear to be a poor historian.  CT head and cervical spine will be obtained for diagnostic imaging with x-ray of the pelvis and chest.  Lab work and urinalysis would be obtained to rule out any acute abnormalities.    CBC shows no leukocytosis or anemia.  MP demonstrates balanced electrolytes with normal liver and kidney function.  Noted troponin of 5.  Urinalysis negative for infection.  CK within normal limits.  Magnesium within normal limits. CT head without contrast shows no evidence of acute intracranial injury.  CT cervical spine shows multilevel spondylosis without acute osseous abnormality.   X-ray of the chest shows no evidence of acute cardiopulmonary process.  X-ray of the pelvis shows no acute abnormality.  Results were discussed with the patient at the bedside.  Ambulation trial was attempted and failed.  Patient could barely stand without becoming unsteady and needing to sit.  I did discuss with the patient that I would recommend admittance to the hospital as he was not able to ambulate and I did not feel comfortable to discharge him home in this condition.  Patient was willing to be admitted for placement.  I spoke to Dr. Moon De La Paz who was covering for admission, and the patient was admitted to the regular nursing floor in good condition.    The patient/family was counseled on clinical impression, expectations, and plan along with recommendations to admission. All questions were answered and involved parties were understanding and agreeable to course of treatment. Case was discussed with admitting physician and any consultants. Bed type, ED treatment and further ED workup decided by joint decision making with admitting team and any consultants. Patient stable for admission per my assessment and further management of patient will be deferred to the inpatient setting.    Patient was seen in conjunction with attending physician Dr. Haley Santiago.   Patient's history, physical exam, diagnostic studies, and treatment plan were discussed thoroughly.    Procedure  Procedures     Adri Alexander PA-C  02/03/25 9412

## 2025-02-04 ENCOUNTER — DOCUMENTATION (OUTPATIENT)
Dept: RESEARCH | Age: OVER 89
End: 2025-02-04
Payer: MEDICARE

## 2025-02-04 LAB
ATRIAL RATE: 88 BPM
HOLD SPECIMEN: NORMAL
P AXIS: 45 DEGREES
P OFFSET: 188 MS
P ONSET: 141 MS
PR INTERVAL: 146 MS
Q ONSET: 214 MS
QRS COUNT: 15 BEATS
QRS DURATION: 90 MS
QT INTERVAL: 362 MS
QTC CALCULATION(BAZETT): 438 MS
QTC FREDERICIA: 411 MS
R AXIS: -37 DEGREES
T AXIS: 57 DEGREES
T OFFSET: 395 MS
VENTRICULAR RATE: 88 BPM

## 2025-02-04 PROCEDURE — 2500000004 HC RX 250 GENERAL PHARMACY W/ HCPCS (ALT 636 FOR OP/ED): Performed by: NURSE PRACTITIONER

## 2025-02-04 PROCEDURE — 96372 THER/PROPH/DIAG INJ SC/IM: CPT | Performed by: NURSE PRACTITIONER

## 2025-02-04 PROCEDURE — 90792 PSYCH DIAG EVAL W/MED SRVCS: CPT

## 2025-02-04 PROCEDURE — 2500000001 HC RX 250 WO HCPCS SELF ADMINISTERED DRUGS (ALT 637 FOR MEDICARE OP)

## 2025-02-04 PROCEDURE — 2500000002 HC RX 250 W HCPCS SELF ADMINISTERED DRUGS (ALT 637 FOR MEDICARE OP, ALT 636 FOR OP/ED): Performed by: NURSE PRACTITIONER

## 2025-02-04 PROCEDURE — 97161 PT EVAL LOW COMPLEX 20 MIN: CPT | Mod: GP | Performed by: PHYSICAL THERAPIST

## 2025-02-04 PROCEDURE — 97166 OT EVAL MOD COMPLEX 45 MIN: CPT | Mod: GO

## 2025-02-04 PROCEDURE — 97530 THERAPEUTIC ACTIVITIES: CPT | Mod: GO

## 2025-02-04 PROCEDURE — G0378 HOSPITAL OBSERVATION PER HR: HCPCS

## 2025-02-04 PROCEDURE — 2500000001 HC RX 250 WO HCPCS SELF ADMINISTERED DRUGS (ALT 637 FOR MEDICARE OP): Performed by: NURSE PRACTITIONER

## 2025-02-04 RX ORDER — BUSPIRONE HYDROCHLORIDE 5 MG/1
5 TABLET ORAL 2 TIMES DAILY
Status: DISCONTINUED | OUTPATIENT
Start: 2025-02-04 | End: 2025-02-04

## 2025-02-04 RX ORDER — GABAPENTIN 300 MG/1
300 CAPSULE ORAL NIGHTLY
Status: DISCONTINUED | OUTPATIENT
Start: 2025-02-04 | End: 2025-02-08 | Stop reason: HOSPADM

## 2025-02-04 RX ORDER — ACETAMINOPHEN 650 MG/1
650 SUPPOSITORY RECTAL EVERY 4 HOURS PRN
Status: DISCONTINUED | OUTPATIENT
Start: 2025-02-04 | End: 2025-02-08 | Stop reason: HOSPADM

## 2025-02-04 RX ORDER — BUSPIRONE HYDROCHLORIDE 5 MG/1
7.5 TABLET ORAL 2 TIMES DAILY
Status: DISCONTINUED | OUTPATIENT
Start: 2025-02-04 | End: 2025-02-08 | Stop reason: HOSPADM

## 2025-02-04 RX ORDER — ONDANSETRON HYDROCHLORIDE 2 MG/ML
4 INJECTION, SOLUTION INTRAVENOUS EVERY 8 HOURS PRN
Status: DISCONTINUED | OUTPATIENT
Start: 2025-02-04 | End: 2025-02-08 | Stop reason: HOSPADM

## 2025-02-04 RX ORDER — ACETAMINOPHEN 160 MG/5ML
650 SOLUTION ORAL EVERY 4 HOURS PRN
Status: DISCONTINUED | OUTPATIENT
Start: 2025-02-04 | End: 2025-02-08 | Stop reason: HOSPADM

## 2025-02-04 RX ORDER — OLANZAPINE 2.5 MG/1
2.5 TABLET ORAL EVERY 8 HOURS PRN
Status: DISCONTINUED | OUTPATIENT
Start: 2025-02-04 | End: 2025-02-05

## 2025-02-04 RX ORDER — HALOPERIDOL 5 MG/ML
0.5 INJECTION INTRAMUSCULAR EVERY 4 HOURS PRN
Status: DISCONTINUED | OUTPATIENT
Start: 2025-02-04 | End: 2025-02-04

## 2025-02-04 RX ORDER — MIRTAZAPINE 15 MG/1
15 TABLET, FILM COATED ORAL NIGHTLY
Status: DISCONTINUED | OUTPATIENT
Start: 2025-02-04 | End: 2025-02-08

## 2025-02-04 RX ORDER — OXYBUTYNIN CHLORIDE 5 MG/1
5 TABLET ORAL 2 TIMES DAILY
Status: DISCONTINUED | OUTPATIENT
Start: 2025-02-04 | End: 2025-02-08 | Stop reason: HOSPADM

## 2025-02-04 RX ORDER — ONDANSETRON 4 MG/1
4 TABLET, ORALLY DISINTEGRATING ORAL EVERY 8 HOURS PRN
Status: DISCONTINUED | OUTPATIENT
Start: 2025-02-04 | End: 2025-02-08 | Stop reason: HOSPADM

## 2025-02-04 RX ORDER — TRAMADOL HYDROCHLORIDE 50 MG/1
50 TABLET ORAL EVERY 8 HOURS PRN
Status: DISCONTINUED | OUTPATIENT
Start: 2025-02-04 | End: 2025-02-08 | Stop reason: HOSPADM

## 2025-02-04 RX ORDER — PANTOPRAZOLE SODIUM 40 MG/1
40 TABLET, DELAYED RELEASE ORAL DAILY
Status: DISCONTINUED | OUTPATIENT
Start: 2025-02-04 | End: 2025-02-08 | Stop reason: HOSPADM

## 2025-02-04 RX ORDER — ENOXAPARIN SODIUM 100 MG/ML
40 INJECTION SUBCUTANEOUS DAILY
Status: DISCONTINUED | OUTPATIENT
Start: 2025-02-04 | End: 2025-02-08 | Stop reason: HOSPADM

## 2025-02-04 RX ORDER — BISACODYL 5 MG
10 TABLET, DELAYED RELEASE (ENTERIC COATED) ORAL DAILY PRN
Status: DISCONTINUED | OUTPATIENT
Start: 2025-02-04 | End: 2025-02-08 | Stop reason: HOSPADM

## 2025-02-04 RX ORDER — ACETAMINOPHEN 325 MG/1
650 TABLET ORAL EVERY 4 HOURS PRN
Status: DISCONTINUED | OUTPATIENT
Start: 2025-02-04 | End: 2025-02-08 | Stop reason: HOSPADM

## 2025-02-04 RX ADMIN — OXYBUTYNIN CHLORIDE 5 MG: 5 TABLET ORAL at 10:39

## 2025-02-04 RX ADMIN — GABAPENTIN 300 MG: 300 CAPSULE ORAL at 20:43

## 2025-02-04 RX ADMIN — MIRTAZAPINE 15 MG: 15 TABLET, FILM COATED ORAL at 20:43

## 2025-02-04 RX ADMIN — BUSPIRONE HYDROCHLORIDE 5 MG: 5 TABLET ORAL at 10:39

## 2025-02-04 RX ADMIN — OXYBUTYNIN CHLORIDE 5 MG: 5 TABLET ORAL at 20:43

## 2025-02-04 RX ADMIN — PANTOPRAZOLE SODIUM 40 MG: 40 TABLET, DELAYED RELEASE ORAL at 05:37

## 2025-02-04 RX ADMIN — ENOXAPARIN SODIUM 40 MG: 40 INJECTION SUBCUTANEOUS at 10:39

## 2025-02-04 RX ADMIN — BUSPIRONE HYDROCHLORIDE 7.5 MG: 5 TABLET ORAL at 20:42

## 2025-02-04 SDOH — HEALTH STABILITY: MENTAL HEALTH: EXPERIENCED ANY OF THE FOLLOWING LIFE EVENTS: DEATH OF FAMILY/FRIEND

## 2025-02-04 SDOH — HEALTH STABILITY: MENTAL HEALTH: HOW MANY DRINKS CONTAINING ALCOHOL DO YOU HAVE ON A TYPICAL DAY WHEN YOU ARE DRINKING?: PATIENT DOES NOT DRINK

## 2025-02-04 SDOH — SOCIAL STABILITY: SOCIAL INSECURITY: ARE THERE ANY APPARENT SIGNS OF INJURIES/BEHAVIORS THAT COULD BE RELATED TO ABUSE/NEGLECT?: NO

## 2025-02-04 SDOH — HEALTH STABILITY: MENTAL HEALTH: HOW OFTEN DO YOU HAVE SIX OR MORE DRINKS ON ONE OCCASION?: NEVER

## 2025-02-04 SDOH — ECONOMIC STABILITY: FOOD INSECURITY: WITHIN THE PAST 12 MONTHS, YOU WORRIED THAT YOUR FOOD WOULD RUN OUT BEFORE YOU GOT THE MONEY TO BUY MORE.: NEVER TRUE

## 2025-02-04 SDOH — HEALTH STABILITY: PHYSICAL HEALTH: ON AVERAGE, HOW MANY DAYS PER WEEK DO YOU ENGAGE IN MODERATE TO STRENUOUS EXERCISE (LIKE A BRISK WALK)?: 2 DAYS

## 2025-02-04 SDOH — ECONOMIC STABILITY: INCOME INSECURITY: IN THE PAST 12 MONTHS HAS THE ELECTRIC, GAS, OIL, OR WATER COMPANY THREATENED TO SHUT OFF SERVICES IN YOUR HOME?: NO

## 2025-02-04 SDOH — SOCIAL STABILITY: SOCIAL INSECURITY: WITHIN THE LAST YEAR, HAVE YOU BEEN HUMILIATED OR EMOTIONALLY ABUSED IN OTHER WAYS BY YOUR PARTNER OR EX-PARTNER?: NO

## 2025-02-04 SDOH — ECONOMIC STABILITY: HOUSING INSECURITY: AT ANY TIME IN THE PAST 12 MONTHS, WERE YOU HOMELESS OR LIVING IN A SHELTER (INCLUDING NOW)?: NO

## 2025-02-04 SDOH — SOCIAL STABILITY: SOCIAL INSECURITY: ARE YOU MARRIED, WIDOWED, DIVORCED, SEPARATED, NEVER MARRIED, OR LIVING WITH A PARTNER?: MARRIED

## 2025-02-04 SDOH — SOCIAL STABILITY: SOCIAL INSECURITY: DO YOU FEEL UNSAFE GOING BACK TO THE PLACE WHERE YOU ARE LIVING?: NO

## 2025-02-04 SDOH — ECONOMIC STABILITY: FOOD INSECURITY: WITHIN THE PAST 12 MONTHS, THE FOOD YOU BOUGHT JUST DIDN'T LAST AND YOU DIDN'T HAVE MONEY TO GET MORE.: NEVER TRUE

## 2025-02-04 SDOH — SOCIAL STABILITY: SOCIAL INSECURITY: ARE YOU OR HAVE YOU BEEN THREATENED OR ABUSED PHYSICALLY, EMOTIONALLY, OR SEXUALLY BY ANYONE?: NO

## 2025-02-04 SDOH — SOCIAL STABILITY: SOCIAL INSECURITY: HAS ANYONE EVER THREATENED TO HURT YOUR FAMILY OR YOUR PETS?: NO

## 2025-02-04 SDOH — SOCIAL STABILITY: SOCIAL NETWORK
DO YOU BELONG TO ANY CLUBS OR ORGANIZATIONS SUCH AS CHURCH GROUPS, UNIONS, FRATERNAL OR ATHLETIC GROUPS, OR SCHOOL GROUPS?: NO

## 2025-02-04 SDOH — SOCIAL STABILITY: SOCIAL INSECURITY: WITHIN THE LAST YEAR, HAVE YOU BEEN AFRAID OF YOUR PARTNER OR EX-PARTNER?: NO

## 2025-02-04 SDOH — SOCIAL STABILITY: SOCIAL INSECURITY: HAVE YOU HAD ANY THOUGHTS OF HARMING ANYONE ELSE?: NO

## 2025-02-04 SDOH — ECONOMIC STABILITY: HOUSING INSECURITY: IN THE LAST 12 MONTHS, WAS THERE A TIME WHEN YOU WERE NOT ABLE TO PAY THE MORTGAGE OR RENT ON TIME?: NO

## 2025-02-04 SDOH — ECONOMIC STABILITY: TRANSPORTATION INSECURITY: IN THE PAST 12 MONTHS, HAS LACK OF TRANSPORTATION KEPT YOU FROM MEDICAL APPOINTMENTS OR FROM GETTING MEDICATIONS?: NO

## 2025-02-04 SDOH — SOCIAL STABILITY: SOCIAL INSECURITY: DO YOU FEEL ANYONE HAS EXPLOITED OR TAKEN ADVANTAGE OF YOU FINANCIALLY OR OF YOUR PERSONAL PROPERTY?: NO

## 2025-02-04 SDOH — HEALTH STABILITY: PHYSICAL HEALTH: ON AVERAGE, HOW MANY MINUTES DO YOU ENGAGE IN EXERCISE AT THIS LEVEL?: 10 MIN

## 2025-02-04 SDOH — SOCIAL STABILITY: SOCIAL INSECURITY: WERE YOU ABLE TO COMPLETE ALL THE BEHAVIORAL HEALTH SCREENINGS?: YES

## 2025-02-04 SDOH — HEALTH STABILITY: MENTAL HEALTH: HOW OFTEN DO YOU HAVE A DRINK CONTAINING ALCOHOL?: NEVER

## 2025-02-04 SDOH — SOCIAL STABILITY: SOCIAL INSECURITY: POSSIBLE ABUSE REPORTED TO:: ADVOCATE

## 2025-02-04 SDOH — SOCIAL STABILITY: SOCIAL INSECURITY: ABUSE: ADULT

## 2025-02-04 SDOH — HEALTH STABILITY: PHYSICAL HEALTH
HOW OFTEN DO YOU NEED TO HAVE SOMEONE HELP YOU WHEN YOU READ INSTRUCTIONS, PAMPHLETS, OR OTHER WRITTEN MATERIAL FROM YOUR DOCTOR OR PHARMACY?: RARELY

## 2025-02-04 SDOH — SOCIAL STABILITY: SOCIAL INSECURITY
WITHIN THE LAST YEAR, HAVE YOU BEEN RAPED OR FORCED TO HAVE ANY KIND OF SEXUAL ACTIVITY BY YOUR PARTNER OR EX-PARTNER?: NO

## 2025-02-04 SDOH — SOCIAL STABILITY: SOCIAL NETWORK: HOW OFTEN DO YOU GET TOGETHER WITH FRIENDS OR RELATIVES?: TWICE A WEEK

## 2025-02-04 SDOH — SOCIAL STABILITY: SOCIAL INSECURITY: HAVE YOU HAD THOUGHTS OF HARMING ANYONE ELSE?: NO

## 2025-02-04 SDOH — SOCIAL STABILITY: SOCIAL INSECURITY
WITHIN THE LAST YEAR, HAVE YOU BEEN KICKED, HIT, SLAPPED, OR OTHERWISE PHYSICALLY HURT BY YOUR PARTNER OR EX-PARTNER?: NO

## 2025-02-04 SDOH — SOCIAL STABILITY: SOCIAL NETWORK: HOW OFTEN DO YOU ATTEND MEETINGS OF THE CLUBS OR ORGANIZATIONS YOU BELONG TO?: NEVER

## 2025-02-04 SDOH — HEALTH STABILITY: PHYSICAL HEALTH
HOW OFTEN DO YOU NEED TO HAVE SOMEONE HELP YOU WHEN YOU READ INSTRUCTIONS, PAMPHLETS, OR OTHER WRITTEN MATERIAL FROM YOUR DOCTOR OR PHARMACY?: NEVER

## 2025-02-04 SDOH — HEALTH STABILITY: MENTAL HEALTH
DO YOU FEEL STRESS - TENSE, RESTLESS, NERVOUS, OR ANXIOUS, OR UNABLE TO SLEEP AT NIGHT BECAUSE YOUR MIND IS TROUBLED ALL THE TIME - THESE DAYS?: ONLY A LITTLE

## 2025-02-04 SDOH — SOCIAL STABILITY: SOCIAL INSECURITY: DOES ANYONE TRY TO KEEP YOU FROM HAVING/CONTACTING OTHER FRIENDS OR DOING THINGS OUTSIDE YOUR HOME?: NO

## 2025-02-04 SDOH — ECONOMIC STABILITY: FOOD INSECURITY: HOW HARD IS IT FOR YOU TO PAY FOR THE VERY BASICS LIKE FOOD, HOUSING, MEDICAL CARE, AND HEATING?: NOT HARD AT ALL

## 2025-02-04 SDOH — SOCIAL STABILITY: SOCIAL INSECURITY: HAVE YOU HAD THOUGHTS OF HARMING ANYONE ELSE?: YES

## 2025-02-04 SDOH — SOCIAL STABILITY: SOCIAL NETWORK: IN A TYPICAL WEEK, HOW MANY TIMES DO YOU TALK ON THE PHONE WITH FAMILY, FRIENDS, OR NEIGHBORS?: THREE TIMES A WEEK

## 2025-02-04 SDOH — ECONOMIC STABILITY: HOUSING INSECURITY: IN THE PAST 12 MONTHS, HOW MANY TIMES HAVE YOU MOVED WHERE YOU WERE LIVING?: 0

## 2025-02-04 SDOH — SOCIAL STABILITY: SOCIAL NETWORK: HOW OFTEN DO YOU ATTEND CHURCH OR RELIGIOUS SERVICES?: NEVER

## 2025-02-04 ASSESSMENT — LIFESTYLE VARIABLES
SKIP TO QUESTIONS 9-10: 1
SUBSTANCE_ABUSE_PAST_12_MONTHS: NO
HOW OFTEN DO YOU HAVE A DRINK CONTAINING ALCOHOL: NEVER
PRESCIPTION_ABUSE_PAST_12_MONTHS: NO
AUDIT-C TOTAL SCORE: 0
HOW OFTEN DO YOU HAVE 6 OR MORE DRINKS ON ONE OCCASION: NEVER
PRESCIPTION_ABUSE_PAST_12_MONTHS: NO
SKIP TO QUESTIONS 9-10: 1
HOW OFTEN DO YOU HAVE A DRINK CONTAINING ALCOHOL: NEVER
AUDIT-C TOTAL SCORE: 0
AUDIT-C TOTAL SCORE: 0
HOW MANY STANDARD DRINKS CONTAINING ALCOHOL DO YOU HAVE ON A TYPICAL DAY: PATIENT DOES NOT DRINK
SKIP TO QUESTIONS 9-10: 1
AUDIT-C TOTAL SCORE: 0
AUDIT-C TOTAL SCORE: 0
HOW OFTEN DO YOU HAVE 6 OR MORE DRINKS ON ONE OCCASION: NEVER
SUBSTANCE_ABUSE_PAST_12_MONTHS: NO
HOW MANY STANDARD DRINKS CONTAINING ALCOHOL DO YOU HAVE ON A TYPICAL DAY: PATIENT DOES NOT DRINK

## 2025-02-04 ASSESSMENT — COGNITIVE AND FUNCTIONAL STATUS - GENERAL
DRESSING REGULAR LOWER BODY CLOTHING: A LITTLE
STANDING UP FROM CHAIR USING ARMS: A LITTLE
DRESSING REGULAR UPPER BODY CLOTHING: A LITTLE
PERSONAL GROOMING: A LITTLE
WALKING IN HOSPITAL ROOM: A LITTLE
HELP NEEDED FOR BATHING: A LITTLE
DRESSING REGULAR LOWER BODY CLOTHING: A LITTLE
HELP NEEDED FOR BATHING: A LOT
STANDING UP FROM CHAIR USING ARMS: A LITTLE
EATING MEALS: A LITTLE
TOILETING: A LITTLE
MOBILITY SCORE: 16
CLIMB 3 TO 5 STEPS WITH RAILING: A LITTLE
HELP NEEDED FOR BATHING: A LITTLE
DAILY ACTIVITIY SCORE: 21
TOILETING: A LOT
HELP NEEDED FOR BATHING: A LITTLE
DAILY ACTIVITIY SCORE: 21
WALKING IN HOSPITAL ROOM: A LITTLE
MOBILITY SCORE: 21
TOILETING: A LITTLE
DAILY ACTIVITIY SCORE: 21
TURNING FROM BACK TO SIDE WHILE IN FLAT BAD: A LOT
MOBILITY SCORE: 21
DRESSING REGULAR LOWER BODY CLOTHING: A LITTLE
WALKING IN HOSPITAL ROOM: A LITTLE
TOILETING: A LITTLE
MOBILITY SCORE: 22
STANDING UP FROM CHAIR USING ARMS: A LITTLE
MOVING FROM LYING ON BACK TO SITTING ON SIDE OF FLAT BED WITH BEDRAILS: A LITTLE
CLIMB 3 TO 5 STEPS WITH RAILING: A LITTLE
CLIMB 3 TO 5 STEPS WITH RAILING: A LITTLE
CLIMB 3 TO 5 STEPS WITH RAILING: A LOT
DAILY ACTIVITIY SCORE: 15
PATIENT BASELINE BEDBOUND: NO
WALKING IN HOSPITAL ROOM: A LITTLE
DRESSING REGULAR LOWER BODY CLOTHING: A LOT
MOVING TO AND FROM BED TO CHAIR: A LITTLE

## 2025-02-04 ASSESSMENT — PATIENT HEALTH QUESTIONNAIRE - PHQ9
2. FEELING DOWN, DEPRESSED OR HOPELESS: NOT AT ALL
SUM OF ALL RESPONSES TO PHQ9 QUESTIONS 1 & 2: 0
1. LITTLE INTEREST OR PLEASURE IN DOING THINGS: NOT AT ALL
2. FEELING DOWN, DEPRESSED OR HOPELESS: NOT AT ALL

## 2025-02-04 ASSESSMENT — ACTIVITIES OF DAILY LIVING (ADL)
LACK_OF_TRANSPORTATION: NO
TOILETING: NEEDS ASSISTANCE
LACK_OF_TRANSPORTATION: NO
LACK_OF_TRANSPORTATION: NO
BATHING_ASSISTANCE: MODERATE
HEARING - LEFT EAR: FUNCTIONAL
DRESSING YOURSELF: NEEDS ASSISTANCE
HEARING - RIGHT EAR: FUNCTIONAL
GROOMING: NEEDS ASSISTANCE
BATHING: NEEDS ASSISTANCE
ASSISTIVE_DEVICE: WALKER
JUDGMENT_ADEQUATE_SAFELY_COMPLETE_DAILY_ACTIVITIES: NO
PATIENT'S MEMORY ADEQUATE TO SAFELY COMPLETE DAILY ACTIVITIES?: NO
ADL_ASSISTANCE: INDEPENDENT
ADL_ASSISTANCE: NEEDS ASSISTANCE
ADEQUATE_TO_COMPLETE_ADL: YES
WALKS IN HOME: NEEDS ASSISTANCE
FEEDING YOURSELF: INDEPENDENT

## 2025-02-04 ASSESSMENT — PAIN SCALES - GENERAL
PAINLEVEL_OUTOF10: 0 - NO PAIN

## 2025-02-04 ASSESSMENT — PAIN - FUNCTIONAL ASSESSMENT
PAIN_FUNCTIONAL_ASSESSMENT: 0-10

## 2025-02-04 NOTE — PROGRESS NOTES
Occupational Therapy    Evaluation/Treatment    Patient Name: Enmanuel Bhatia  MRN: [id number]  Department: [address]  Room: [address] [place] [address]  Today's Date: 02/04/25  Time Calculation  Start Time: 0905  Stop Time: 0930  Time Calculation (min): 25 min       Assessment:  OT Assessment: Referral received, chart reviewed, and evaluation complete.  Presents with impiared cognition, balance, and coordination.  Would benefit from acute OT services.  Recommend moderate intensity upon discharge.  Prognosis: Good  Barriers to Discharge Home: Caregiver assistance, Cognition needs, Physical needs  Caregiver Assistance: Caregiver assistance needed per identified barriers - however, level of patient's required assistance exceeds assistance available at home  Cognition Needs: Cognition-related high falls risk  Physical Needs: 24hr mobility assistance needed  Evaluation/Treatment Tolerance: Patient tolerated treatment well  Medical Staff Made Aware: Yes  End of Session Communication: Bedside nurse  End of Session Patient Position: Up in chair (patient has a sitter)  Prognosis: Good  Evaluation/Treatment Tolerance: Patient tolerated treatment well  Medical Staff Made Aware: Yes  Plan:  Treatment Interventions: ADL retraining, Functional transfer training, Patient/family training, Neuromuscular reeducation, Compensatory technique education  OT Frequency: 4 times per week  OT Recommended Transfer Status: Moderate assist, Assist of 1  OT - OK to Discharge: Yes  Treatment Interventions: ADL retraining, Functional transfer training, Patient/family training, Neuromuscular reeducation, Compensatory technique education    Subjective   Current Problem:  1. Recurrent falls        2. Unsteadiness          General:   OT Received On: 02/04/25  General  Reason for Referral: decreased functional status due to fall  Referred By: Moon De La Paz MD  Past Medical History Relevant to Rehab: normal pressure hydrocephalus, BPH, Gait instability,  SBO, Memory  impaired, GERD, parasthesis, arthritis  Family/Caregiver Present: No  Prior to Session Communication: Bedside nurse  Patient Position Received: Bed, 3 rail up, Alarm off, caregiver present  General Comment: 89 year old WM admitted from home with c/o multiple falls at home   Precautions:  Hearing/Visual Limitations: hearing WFL, wears glasses  Medical Precautions: Fall precautions     Date/Time Vitals Session Patient Position Pulse Resp SpO2 BP MAP (mmHg)    02/04/25 0833 --  --  87  17  97 %  135/69  87                 Pain:  Pain Assessment  Pain Assessment: 0-10  0-10 (Numeric) Pain Score: 0 - No pain    Objective   Cognition:  Overall Cognitive Status: Impaired  Orientation Level: Disoriented to situation, Disoriented to time, Disoriented to place  Memory: Exceptions to WFL  Problem Solving: Exceptions to WFL  Safety/Judgement: Exceptions to WFL  Insight: Severe  Impulsive: Moderately  Processing Speed: Delayed  Cognition Test Scores  Cognition Tests: Cognition Test Performed  SBT Test Score: 24        Home Living:  Type of Home: House  Lives With: Spouse  Home Adaptive Equipment: Walker rolling or standard  Home Living Comments: Poor historian unable to provide any history  Prior Function:  Level of [place]: Needs assistance with ADLs, Needs assistance with homemaking, Needs assistance with functional transfers  Receives Help From: Family  ADL Assistance: Needs assistance  Homemaking Assistance: Needs assistance  Ambulatory Assistance: Needs assistance  Vocational: Retired  Hand Dominance: Right  Prior Function Comments: impaired cognition and unable to provide any information  IADL History:  Homemaking Responsibilities: No  ADL:  Eating Assistance:  (set up)  Grooming Assistance: Minimal  Bathing Assistance: Moderate  UE Dressing Assistance: Minimal  LE Dressing Assistance: Moderate  Toileting Assistance with Device: Moderate  Functional Assistance: Moderate    Activity Tolerance:  Endurance: Endurance  does not limit participation in activity       Bed Mobility/Transfers: Bed Mobility  Bed Mobility: Yes  Bed Mobility 1  Bed Mobility 1: Supine to sitting  Level of Assistance 1: Close supervision    Transfers  Transfer: Yes  Transfer 1  Transfer From 1: Bed to  Transfer to 1: Stand  Technique 1: Sit to stand  Transfer Device 1: Walker  Transfer Level of Assistance 1: Moderate assistance  Transfers 2  Transfer From 2: Stand to  Transfer to 2: Chair with arms  Technique 2: Stand to sit  Transfer Device 2: Walker  Transfer Level of Assistance 2: Moderate assistance    Sitting Balance:  Static Sitting Balance  Static Sitting-Balance Support: Feet supported  Static Sitting-Level of Assistance: Close supervision  Standing Balance:  Static Standing Balance  Static Standing-Balance Support: Bilateral upper extremity supported  Static Standing-Level of Assistance: Moderate assistance  Static Standing-Comment/Number of Minutes: leans posteriorly needs verbal and tactile cues to increased width of KARL and move walker forward    Sensation:  Light Touch: No apparent deficits  Strength:  Strength Comments: BUE 3+/5  Coordination:  Movements are Fluid and Coordinated: No   Hand Function:  Hand Function  Gross Grasp: Functional  Coordination: Impaired        Outcome Measures: [facility] Daily Activity  Putting on and taking off regular lower body clothing: A lot  Bathing (including washing, rinsing, drying): A lot  Putting on and taking off regular upper body clothing: A little  Toileting, which includes using toilet, bedpan or urinal: A lot  Taking care of personal grooming such as brushing teeth: A little  Eating Meals: A little  Daily Activity - Total Score: 15        Education Documentation  Precautions, taught by Jani Benitez OT at 2/4/2025  9:54 AM.  Learner: Patient  Readiness: Acceptance  Method: Demonstration, Explanation  Response: No Evidence of Learning  Comment: call light, falls precautions    Education  Comments  No comments found.               Goals:         Problem: Bathing  Goal: LTG - Patient will utilize adaptive techniques to bathe body with close supervision and set up  Outcome: Progressing     Problem: Dressings Lower Extremities  Goal: LTG - Patient will dress lower body with close supervision and set up  Outcome: Progressing     Problem: Functional Mobility  Goal: Perfrom functional mobility to and from the bathroom with 2ww and close supervision  Outcome: Progressing     Problem: Toileting  Goal: LTG - Patient will complete daily toileting tasks with close supervision and 2ww  Outcome: Progressing     Problem: OT Transfers  Goal: LTG - Patient will perform all functional transfers with close supervision and 2ww  Outcome: Progressing

## 2025-02-04 NOTE — ASSESSMENT & PLAN NOTE
-fall at home after PT  -follows with Dr. Quigley outpatient for multifactoral gait disorder with paresthesias  -resume PTA Neurontin  -fall precautions  -PT/OT consulted  -may need placement

## 2025-02-04 NOTE — CARE PLAN
Problem: Bathing  Goal: LTG - Patient will utilize adaptive techniques to bathe body with close supervision and set up  Outcome: Progressing     Problem: Dressings Lower Extremities  Goal: LTG - Patient will dress lower body with close supervision and set up  Outcome: Progressing     Problem: Functional Mobility  Goal: Perfrom functional mobility to and from the bathroom with 2ww and close supervision  Outcome: Progressing     Problem: Toileting  Goal: LTG - Patient will complete daily toileting tasks with close supervision and 2ww  Outcome: Progressing     Problem: OT Transfers  Goal: LTG - Patient will perform all functional transfers with close supervision and 2ww  Outcome: Progressing

## 2025-02-04 NOTE — RESEARCH NOTES
Artificial Intelligence Monitoring in Nursing (AIMS Nursing) Study    Principle Investigator - Dr. Mitch Keenan  Research Coordinator - BECCA Browning     Patient Name - Enmanuel Bhatia  Date - 2/4/2025 2:17 PM  Location - [facility]    Enmanuel Bhatia was approached by BECCA Browning to talk about participating in the AIMS Nursing Study. The consent form was signed by the patient and they were given a copy for their records. Study protocol was followed and patient was given study contact information.     BECCA Browning

## 2025-02-04 NOTE — PROGRESS NOTES
Enmanuel Bhatia is a 89 y.o. male admitted for No Principal Problem currently listed. Pharmacy reviewed the patient's [id number] medications and allergies for accuracy.    Medications ADDED:  cholecalciferol (Vitamin D-3) 125 mcg (5000 UT) capsule  cyanocobalamin, vitamin B-12, (VITAMIN B-12 ORAL)  Gabapentin 300 mg capsule  Nystatin topical cream  Medications CHANGED:  fluticasone (Flonase) 50 mcg/actuation nasal spray  buspirone HCl (BUSPIRONE ORAL)  trospium (Sanctura) 20 mg tablet  Medications REMOVED:   Vitamin D3 50,000 unit tablet   Fluticasone prp sod.chl, bicarb 0.9% ,kit spray suspension and spray  Vitamin b complex  Vitamin d 3-folic acid tablet  Tamsulosin 0.4 mg 24 hr tablet  Solifenacin 10 mg tablet    The list below reflects the updated PTA list. Comments regarding how patient may be taking medications differently can be found in the Admit Orders Activity  Prior to Admission Medications   Prescriptions Last Dose Informant   buspirone HCl (BUSPIRONE ORAL) 2/3/2025 Morning Spouse/Significant Other   Sig: Take 1 tablet by mouth 2 times a day.   cholecalciferol (Vitamin D-3) 125 mcg (5000 UT) capsule 2/3/2025 Morning Spouse/Significant Other   Sig: Take 1 capsule (125 mcg) by mouth once daily.   cyanocobalamin, vitamin B-12, (VITAMIN B-12 ORAL) 2/3/2025 Morning Spouse/Significant Other   Sig: Take 1 tablet by mouth once daily.   fluticasone (Flonase) 50 mcg/actuation nasal spray 2/3/2025 Morning Spouse/Significant Other   Sig: Administer 1 spray into each nostril once daily.   Shake gently. Before first use, prime pump. After use,   clean tip and replace cap.   gabapentin (Neurontin) 300 mg capsule 2/2/2025 Bedtime Spouse/Significant Other   Sig: Take 1 capsule (300 mg) by mouth once daily at bedtime.   mirtazapine (Remeron) 15 mg tablet 2/2/2025 Bedtime Spouse/Significant Other   Sig: Take 1 tablet (15 mg) by mouth once daily at bedtime.   nystatin (Mycostatin) cream 2/3/2025 Morning  Spouse/Significant Other   Sig: Apply 1 Application topically 2 times a day.   pantoprazole (ProtoNix) 40 mg EC tablet 2/3/2025 Morning Spouse/Significant Other   Sig: Take 1 tablet (40 mg) by mouth early in the morning..   trospium (Sanctura) 20 mg tablet 2/2/2025 Bedtime Spouse/Significant Other   Sig: Take 1 tablet (20 mg) by mouth once daily at bedtime.      Facility-Administered Medications: None        The list below reflects the updated allergy list. Please review each documented allergy for additional clarification and justification.  Allergies  Reviewed by Adri Alexander PA-C on 2/3/2025   No Known Allergies         Pharmacy has been updated to [facility], [phone].    Sources used to complete the med history include:   Patient does not know medications patient wife interviewed over phone to confirm medications, dispense report, care everywhere, chart review history    Below are additional concerns with the patient's PTA list.  None    Ayo Toledo CPhT  Please reach out via [employer] Secure Chat for questions

## 2025-02-04 NOTE — CONSULTS
"Nutrition Progress Note    Consult for MST score 2 \"unsure\" of any wt loss. Will defer assessment at this time. Please consult dietitian if pt's nutrition status declines during this admission.     Daily Weight  02/03/25 : 81.6 kg (180 lb)  02/04/24 : 81.6 kg (180 lb)  02/13/24 : 81.6 kg (180 lb)     "

## 2025-02-04 NOTE — PROGRESS NOTES
Enmanuel Bhatia is a 89 y.o. male on day 0 of admission presenting with Recurrent falls.      Subjective   Labs meds, confussed       Objective     Last Recorded Vitals  /69 (BP Location: Left arm, Patient Position: Lying)   Pulse 87   Temp 36.8 °C (98.2 °F) (Oral)   Resp 17   Wt 81.6 kg (180 lb)   SpO2 97%   Intake/Output last 3 Shifts:    Intake/Output Summary (Last 24 hours) at 2/4/2025 1429  Last data filed at 2/4/2025 1144  Gross per 24 hour   Intake --   Output 200 ml   Net -200 ml       Admission Weight  Weight: 81.6 kg (180 lb) (02/03/25 1704)    Daily Weight  02/03/25 : 81.6 kg (180 lb)    Image Results      Physical Exam    Relevant Results             Assessment/Plan                  Assessment & Plan  Recurrent falls    Fall  -fell after PT today - wife could  not get him up -EMS called  -CT cervical spine, CT head, CXR and XR pelvis as above  -fall precautions  -PT/OT consulted  -unclear if lightheaded/dizzy or mechanical fall  -EKG as above  -tele monitoring  Gait instability  -fall at home after PT  -follows with Dr. Quigley outpatient for multifactoral gait disorder with paresthesias  -resume PTA Neurontin  -fall precautions  -PT/OT consulted  -may need placement  Memory impairment  -poor historian  -history of normal pressure hydrocepalus  -cannot recall why he is in hospital   -states he drinks 2 beers nightly - wife states he hasn't drank any alcohol for  many years  Benign prostatic hyperplasia with lower urinary tract symptoms  See below  Mixed stress and urge urinary incontinence  -follows with Dr. Martinez outpt  -U/A as above-no active signs of UTI, no leukocytosis  -resume PTA [facility]  History of depression  -resume PTA Buspirone  Murmur, cardiac  -noted +murmur on exam  -consider echo/cardiology consultation as needed  -tele monitoring  Sitter better              Moon De La Paz MD

## 2025-02-04 NOTE — PROGRESS NOTES
02/04/25 1453   Discharge Planning   Living Arrangements Spouse/significant other   Support Systems Spouse/significant other;Family members   Assistance Needed has shower chair and walker   Type of Residence Private residence   Number of Stairs to Enter Residence 3   Number of Stairs Within Residence 0   Who is requesting discharge planning? Provider   Home or Post Acute Services Post acute facilities (Rehab/SNF/etc)   Expected Discharge Disposition SNF   Does the patient need discharge transport arranged? Yes   RoundTrip coordination needed? Yes   Financial Resource Strain   How hard is it for you to pay for the very basics like food, housing, medical care, and heating? Not hard   Housing Stability   In the last 12 months, was there a time when you were not able to pay the mortgage or rent on time? N   In the past 12 months, how many times have you moved where you were living? 0   At any time in the past 12 months, were you homeless or living in a shelter (including now)? N   Transportation Needs   In the past 12 months, has lack of transportation kept you from medical appointments or from getting medications? no   In the past 12 months, has lack of transportation kept you from meetings, work, or from getting things needed for daily living? No   Patient Choice   Provider Choice list and CMS website (https://medicare.gov/care-compare#search) for post-acute Quality and Resource Measure Data were provided and reviewed with: Family   Patient / Family choosing to utilize agency / facility established prior to hospitalization No     TCC met with patients spouse and grand daughter at bedside. Introduced self and explained role. Patient lives home with spouse. There are 3 steps to enter and no steps within the home. Patient has shower chair in bathroom, wife looking to get a raised toilet seat as well. Patient uses a walker and spouse and granddaughter transport to appointments. States daughter helps with showering  patient in the home. No reports of smoking or alcohol use.  PCP is Grant Pacheco . [facility] is pharmacy of choice for medications. Patient has established LW and POA. updated regarding therapy recommendations. Patient agreeable to SNF at this time. List provide to review and make choices. TCC will follow for discharge planning. States patient previously at [facility] and would like referral sent at this time     Precert needed prior to discharge   ** do not discharge without speaking to care coordination**  MD WILL NEED TO SIGN/CERTIFY GOLDENROD AT THE TIME OF DISCHARGE FOR SNF.

## 2025-02-04 NOTE — PROGRESS NOTES
Physical Therapy    Physical Therapy Evaluation    Patient Name: Enmanuel Bhatia  MRN: [id number]  Department: [address]  Room: [address] [place] [address]  Today's Date: 2/4/2025   Time Calculation  Start Time: 1025  Stop Time: 1040  Time Calculation (min): 15 min    Assessment/Plan   PT Assessment  PT Assessment Results: Decreased strength, Decreased mobility, Decreased endurance, Impaired balance, Decreased cognition, Decreased safety awareness  Rehab Prognosis: Good  Barriers to Discharge Home: Caregiver assistance, Physical needs  Caregiver Assistance: Caregiver assistance needed per identified barriers - however, level of patient's required assistance exceeds assistance available at home  Physical Needs: High falls risk due to function or environment  Strengths: Support and attitude of living partners, Living arrangement secure, Attitude of self  Barriers to Participation: Comorbidities  Assessment Comment: He presented with the above listed impairments (see Assessment Results). Pt required minimally increased assist during functional mobility compared to his reported baseline. Pt would benefit from continued skilled PT services for maximizing independence and safety prior to & after discharge (MODERATE intensity).  End of Session Patient Position: Up in chair (sitter in room)  IP OR SWING BED PT PLAN  Inpatient or Swing Bed: Inpatient  PT Plan  Treatment/Interventions: Bed mobility, Transfer training, Gait training, Strengthening, Therapeutic exercise, Therapeutic activity, Balance training  PT Plan: Ongoing PT  PT Frequency: 4 times per week  PT Discharge Recommendations: Moderate intensity level of continued care  PT Recommended Transfer Status: Assist x1, Assistive device (FWW)  PT - OK to Discharge: Yes    Subjective   General Visit Information:  General  Reason for Referral: Impaired functional mobility. This 89 year old was admitted for recurrent falls.  Past Medical History Relevant to Rehab: normal pressure hydrocephalus,  BPH, Gait instability,  SBO, Memory impaired, GERD, parasthesis, arthritis  Family/Caregiver Present: No  Prior to Session Communication: Bedside nurse  Patient Position Received: Up in chair (sitter present)  General Comment: Cleared by RN for participation. Pt agreed to session and was fully engaged throughout.  Home Living:  Home Living  Type of Home: House  Lives With: Spouse  Home Adaptive Equipment: Walker rolling or standard, Cane  Home Access: Level entry  Home Living Comments: Pt was a poor historian and unable to provide home sett up for PLOF infomation.  Prior Level of Function:  Prior Function Per Pt/Caregiver Report  Receives Help From: Family  ADL Assistance: Independent  Homemaking Assistance: Needs assistance  Ambulatory Assistance: Independent  Precautions:  Precautions  Hearing/Visual Limitations: hearing WFL, wears glasses  Medical Precautions: Fall precautions       Objective   Pain:  Pain Assessment  0-10 (Numeric) Pain Score: 0 - No pain  Cognition:  Cognition  Overall Cognitive Status: Impaired  Orientation Level: Disoriented to place, Disoriented to time, Disoriented to situation    General Assessments:  Activity Tolerance  Endurance: Endurance does not limit participation in activity    ROM  BLE AROM: grossly WFL    Sensation  Sensation Comment: Not tested; pt denied paresthesias    Strength  Strength Comments: BLE: grossly >/=3+/5    Coordination  Movements are Fluid and Coordinated: Yes    Postural Control  Postural Control: Within Functional Limits    Static Standing Balance  Static Standing-Balance Support: Bilateral upper extremity supported  Static Standing-Level of Assistance: Minimum assistance  Dynamic Standing Balance  Dynamic Standing-Balance Support: Bilateral upper extremity supported  Dynamic Standing-Level of Assistance: Minimum assistance    Functional Assessments:  Transfer 1  Technique 1: Sit to stand  Transfer Device 1: Walker  Transfer Level of Assistance 1: Minimum  assistance (x1 trial from bedside chair)  Transfers 2  Technique 2: Stand to sit  Transfer Device 2: Walker  Transfer Level of Assistance 2: Minimum assistance ([id number] at bedside chair)    Ambulation/Gait Training  Ambulation/Gait Training Performed: Yes  Ambulation/Gait Training 1  Surface 1: Level tile  Device 1: Rolling walker  Assistance 1: Minimum assistance  Quality of Gait 1: Decreased step length (Minima; step through pattern with slow velocity. Pt ambulated 2 steps fwd then 2 steps retro. Fairly steady gait.)     Outcome Measures:  [facility] Basic Mobility  Turning from your back to your side while in a flat bed without using bedrails: A little  Moving from lying on your back to sitting on the side of a flat bed without using bedrails: A lot  Moving to and from bed to chair (including a wheelchair): A little  Standing up from a chair using your arms (e.g. wheelchair or bedside chair): A little  To walk in hospital room: A little  Climbing 3-5 steps with railing: A lot  Basic Mobility - Total Score: 16    Encounter Problems       Encounter Problems (Active)       PT Problem       Pt will transition supine<>sit with mod I.        Start:  02/04/25    Expected End:  02/25/25            Pt will transfer sit<>stand with FWW & mod I.       Start:  02/04/25    Expected End:  02/25/25            Pt will ambulate >/=30 ft with FWW & close S.       Start:  02/04/25    Expected End:  02/25/25                   Education Documentation  Precautions, taught by Shawna Umana PT at 2/4/2025 11:49 AM.  Learner: Patient  Readiness: Acceptance  Method: Explanation  Response: Needs Reinforcement  Comment: Fall precautions, PT role & POC.    Mobility Training, taught by Shawna Umana PT at 2/4/2025 11:49 AM.  Learner: Patient  Readiness: Acceptance  Method: Explanation  Response: Needs Reinforcement  Comment: Fall precautions, PT role & POC.    Education Comments  No comments found.

## 2025-02-04 NOTE — ED NOTES
Pt attempted using urinal multiple times, pt placed on purewick. Pt continues to try and pull off purewick, pt reminded what its for. Pt is now resting in bed, bed alarm active.      Rachel Anderson, PCNA  02/03/25 2053

## 2025-02-04 NOTE — H&P
History Of Present Illness  Enmanuel Bhatia is a 89 y.o. male with past medical history significant for normal pressure hydrocephalus, BPH, gait instability, GERD, small bowel obstruction, and memory impairment who presented to the emergency department after he sustained a fall at home. Patient is a poor historian so most of HPI is taken from chart and from wife.  Wife stated that after therapy today, she found him on the living room floor and she believes he attempted to ambulate on his own. Wife stated that he uses a walker, but often only uses one hand on walker when attempting to walk.  Wife related that the patient follows with Dr. Martinez the urologist for BPH and urinary incontinence and that he follows with Dr. Quigley, the neurologist for his hydrocephalus and gait disorder. Confirmed with wife that patient does follow with Dr. Pacheco and was last seen 2 weeks ago. Wife stated that the patient has not been ill recently, no fevers, chills, chest pain, shortness of breath, abdominal pain, nausea, vomiting, or diarrhea. She did endorse urinary leakage and frequency.      Past Medical History  Past Medical History:   Diagnosis Date    Benign prostatic hyperplasia without lower urinary tract symptoms     Enlarged prostate    Gait instability     GERD (gastroesophageal reflux disease)     Hydrocephalus     Memory impairment     Paresthesias     Personal history of other diseases of the musculoskeletal system and connective tissue     History of arthritis    Small bowel obstruction (Multi)     Urinary incontinence        Surgical History  Past Surgical History:   Procedure Laterality Date    COLON SURGERY      OTHER SURGICAL HISTORY  07/22/2019    No history of surgery        Social History  He reports that he has quit smoking. His smoking use included cigarettes. He has never used smokeless tobacco. He reports that he does not currently use alcohol. He reports that he does not use drugs.    Family History  No  "family history on file.     Allergies  Patient has no known allergies.    Review of Systems   Reason unable to perform ROS: memory impairment.        Physical Exam  Constitutional:       Appearance: Normal appearance. He is normal weight.   HENT:      Head: Normocephalic and atraumatic.      Nose: Nose normal.      Mouth/Throat:      Mouth: Mucous membranes are moist.      Pharynx: Oropharynx is clear.   Eyes:      Extraocular Movements: Extraocular movements intact.      Conjunctiva/sclera: Conjunctivae normal.      Pupils: Pupils are equal, round, and reactive to light.   Cardiovascular:      Rate and Rhythm: Normal rate.      Heart sounds: Murmur heard.   Pulmonary:      Effort: Pulmonary effort is normal.      Breath sounds: Normal breath sounds.   Abdominal:      General: Abdomen is flat.      Palpations: Abdomen is soft.   Musculoskeletal:         General: Normal range of motion.      Cervical back: Normal range of motion and neck supple.   Skin:     General: Skin is warm and dry.      Capillary Refill: Capillary refill takes less than 2 seconds.   Neurological:      Mental Status: He is alert and oriented to person, place, and time. Mental status is at baseline.      Gait: Gait abnormal.   Psychiatric:         Mood and Affect: Mood normal.          Last Recorded Vitals  Blood pressure 125/65, pulse 92, temperature 36.8 °C (98.2 °F), temperature source Oral, resp. rate 16, height 1.778 m (5' 10\"), weight 81.6 kg (180 lb), SpO2 96%.    Relevant Results    CT cervical spine wo IV contrast    Result Date: 2/3/2025  Interpreted By:  Derick Dallas, STUDY: CT CERVICAL SPINE WO IV CONTRAST;  2/3/2025 6:07 pm   INDICATION: Signs/Symptoms:fall, possible head injury.     COMPARISON: CT cervical spine 01/24/2023   ACCESSION NUMBER(S): [id number]   ORDERING CLINICIAN: JUAN DICKINSON   TECHNIQUE: Axial CT images of the cervical spine are obtained. Axial, coronal and sagittal reconstructions are provided for review.   " FINDINGS:     Fractures: There is no evidence for an acute fracture of the cervical spine.   Vertebral Alignment: No obvious posttraumatic malalignment.   Craniocervical Junction: The odontoid process and craniocervical junction are intact.   Vertebrae/Disc Spaces:  Severe multilevel spondylosis. Multilevel disc space narrowing. Multilevel facet arthropathy Multilevel uncovertebral hypertrophy.Multilevel osteophyte formation.Chronic appearing ossicle adjacent to the left lateral mass of C1.   Prevertebral/Paraspinal Soft Tissues: The prevertebral and paraspinal soft tissues are unremarkable.         Multilevel spondylosis without acute osseous abnormality of the cervical spine.   MACRO: None   Signed by: Derick Dallas 2/3/2025 6:35 PM Dictation workstation:   [id number]    CT head wo IV contrast    Result Date: 2/3/2025  Interpreted By:  Derick Dallas, STUDY: CT HEAD WO IV CONTRAST;  2/3/2025 6:07 pm   INDICATION: Signs/Symptoms:fall, possible head injury.   COMPARISON: Head CT 02/03/2024   ACCESSION NUMBER(S): [id number]   ORDERING CLINICIAN: JUAN DCIKINSON   TECHNIQUE: Noncontrast axial CT scan of head was performed. Angled reformats in brain and bone windows were generated. The images were reviewed in bone, brain, blood and soft tissue windows.   FINDINGS: CSF Spaces: Ventricles appear stable respect to size and configuration. There is no extraaxial fluid collection.   Parenchyma: Periventricular and subcortical white matter hypoattenuation is nonspecific, however likely reflects chronic microvascular ischemic versus chronic hypertensive changes. The grey-white differentiation is intact. There is no mass effect or midline shift.  There is no intracranial hemorrhage.   Calvarium: No acute displaced calvarial fracture.   Paranasal sinuses and mastoids: Bilateral ethmoid sinus and right maxillary mucosal thickening.       No CT evidence of acute intracranial injury.       MACRO: None     Signed by: Derick  Stdaniellein 2/3/2025 6:33 PM Dictation workstation:   [id number]    XR chest 1 view    Result Date: 2/3/2025  Interpreted By:  Kailash Haley, STUDY: XR CHEST 1 VIEW;  2/3/2025 5:52 pm   INDICATION: Signs/Symptoms:fall.     COMPARISON: None.   ACCESSION NUMBER(S): [id number]   ORDERING CLINICIAN: JUAN DICKINSON   FINDINGS:         CARDIOMEDIASTINAL SILHOUETTE: Cardiomediastinal silhouette is normal in size and configuration.   LUNGS: Lungs are clear. There is no consolidation or effusion.   ABDOMEN: No remarkable upper abdominal findings.   BONES: No acute osseous changes.       1.  No evidence of acute cardiopulmonary process.       MACRO: None   Signed by: Kailash Haley 2/3/2025 5:59 PM Dictation workstation:   [id number]    XR pelvis 1-2 views    Result Date: 2/3/2025  Interpreted By:  Kailash Haley, STUDY: XR PELVIS 1-2 VIEWS; ;  2/3/2025 5:52 pm   INDICATION: Signs/Symptoms:fall.     COMPARISON: None.   ACCESSION NUMBER(S): [id number]   ORDERING CLINICIAN: JUAN DICKINSON   FINDINGS: Pelvis, single view   There is no fracture. There is no dislocation. There are no degenerative changes. There is no lytic or sclerotic lesion. There is no soft tissue abnormality seen. Partially visualized spondylosis in the lower lumbar spine.       No acute abnormality in the pelvis.   MACRO: None   Signed by: Kailash Haley 2/3/2025 5:59 PM Dictation workstation:   [id number]      Results for orders placed or performed during the hospital encounter of 02/03/25 (from the past 24 hours)   Lavender Top   Result Value Ref Range    Extra Tube Hold for add-ons.    Comprehensive metabolic panel   Result Value Ref Range    Glucose 98 74 - 99 mg/dL    Sodium 135 (L) 136 - 145 mmol/L    Potassium 4.1 3.5 - 5.3 mmol/L    Chloride 101 98 - 107 mmol/L    Bicarbonate 29 21 - 32 mmol/L    Anion Gap 9 (L) 10 - 20 mmol/L    Urea Nitrogen 12 6 - 23 mg/dL    Creatinine 1.00 0.50 - 1.30 mg/dL    eGFR 72 >60 mL/min/1.73m*2    Calcium  9.1 8.6 - 10.3 mg/dL    Albumin 3.8 3.4 - 5.0 g/dL    Alkaline Phosphatase 73 33 - 136 U/L    Total Protein 6.0 (L) 6.4 - 8.2 g/dL    AST 14 9 - 39 U/L    Bilirubin, Total 0.5 0.0 - 1.2 mg/dL    ALT 11 10 - 52 U/L   Magnesium   Result Value Ref Range    Magnesium 1.82 1.60 - 2.40 mg/dL   Creatine Kinase   Result Value Ref Range    Creatine Kinase 72 0 - 325 U/L   CBC and Auto Differential   Result Value Ref Range    WBC 6.2 4.4 - 11.3 x10*3/uL    nRBC 0.0 0.0 - 0.0 /100 WBCs    RBC 4.13 (L) 4.50 - 5.90 x10*6/uL    Hemoglobin 13.0 (L) 13.5 - 17.5 g/dL    Hematocrit 37.6 (L) 41.0 - 52.0 %    MCV 91 80 - 100 fL    MCH 31.5 26.0 - 34.0 pg    MCHC 34.6 32.0 - 36.0 g/dL    RDW 12.9 11.5 - 14.5 %    Platelets 225 150 - 450 x10*3/uL    Neutrophils % 67.2 40.0 - 80.0 %    Immature Granulocytes %, Automated 0.6 0.0 - 0.9 %    Lymphocytes % 21.0 13.0 - 44.0 %    Monocytes % 7.4 2.0 - 10.0 %    Eosinophils % 3.0 0.0 - 6.0 %    Basophils % 0.8 0.0 - 2.0 %    Neutrophils Absolute 4.18 1.60 - 5.50 x10*3/uL    Immature Granulocytes Absolute, Automated 0.04 0.00 - 0.50 x10*3/uL    Lymphocytes Absolute 1.31 0.80 - 3.00 x10*3/uL    Monocytes Absolute 0.46 0.05 - 0.80 x10*3/uL    Eosinophils Absolute 0.19 0.00 - 0.40 x10*3/uL    Basophils Absolute 0.05 0.00 - 0.10 x10*3/uL   Troponin I, High Sensitivity, Initial   Result Value Ref Range    Troponin I, High Sensitivity 5 0 - 20 ng/L   Urinalysis with Reflex Culture and Microscopic   Result Value Ref Range    Color, Urine Light-Yellow Light-Yellow, Yellow, Dark-Yellow    Appearance, Urine Clear Clear    Specific Gravity, Urine 1.006 1.005 - 1.035    pH, Urine 6.5 5.0, 5.5, 6.0, 6.5, 7.0, 7.5, 8.0    Protein, Urine NEGATIVE NEGATIVE, 10 (TRACE), 20 (TRACE) mg/dL    Glucose, Urine Normal Normal mg/dL    Blood, Urine NEGATIVE NEGATIVE    Ketones, Urine NEGATIVE NEGATIVE mg/dL    Bilirubin, Urine NEGATIVE NEGATIVE    Urobilinogen, Urine Normal Normal mg/dL    Nitrite, Urine NEGATIVE NEGATIVE     Leukocyte Esterase, Urine 75 Manjinder/uL (A) NEGATIVE   Microscopic Only, Urine   Result Value Ref Range    WBC, Urine 6-10 (A) 1-5, NONE /HPF    RBC, Urine NONE NONE, 1-2, 3-5 /HPF    Bacteria, Urine 1+ (A) NONE SEEN /HPF      EKG:  NSR -  no acute findings       Assessment/Plan   Assessment & Plan  Recurrent falls    Fall  -fell after PT today - wife could  not get him up -EMS called  -CT cervical spine, CT head, CXR and XR pelvis as above  -fall precautions  -PT/OT consulted  -unclear if lightheaded/dizzy or mechanical fall  -EKG as above  -tele monitoring  Gait instability  -fall at home after PT  -follows with Dr. Quigley outpatient for multifactoral gait disorder with paresthesias  -resume PTA Neurontin  -fall precautions  -PT/OT consulted  -may need placement  Memory impairment  -poor historian  -history of normal pressure hydrocepalus  -cannot recall why he is in hospital   -states he drinks 2 beers nightly - wife states he hasn't drank any alcohol for  many years  Benign prostatic hyperplasia with lower urinary tract symptoms  See below  Mixed stress and urge urinary incontinence  -follows with Dr. Martinez outpt  -U/A as above-no active signs of UTI, no leukocytosis  -resume PTA [facility]  History of depression  -resume PTA Buspirone  Murmur, cardiac  -noted +murmur on exam  -consider echo/cardiology consultation as needed  -tele monitoring           I spent 75 minutes in the professional and overall care of this patient.      Joslyn Rodriges, APRN-CNP, DNP

## 2025-02-04 NOTE — ASSESSMENT & PLAN NOTE
-poor historian  -history of normal pressure hydrocepalus  -cannot recall why he is in hospital   -states he drinks 2 beers nightly - wife states he hasn't drank any alcohol for  many years

## 2025-02-04 NOTE — NURSING NOTE
"Pt showing periods of impulsiveness and constant reminders not to get up. Pt keeps saying \" I have to pee\" or \" I just want to walk.\" Reminding pt purewick is in place, and bed alarm on.   "

## 2025-02-04 NOTE — PROGRESS NOTES
02/04/25 1428   Discharge Planning   Expected Discharge Disposition Othe     VM left for spouse to discuss dc plans     ** do not discharge without speaking to care coordination**

## 2025-02-04 NOTE — NURSING NOTE
Pt arrived to room 440. A&o to self.  Oriented to room, call light, tv remote, phone. Call light within reach. Bed alarm on. Sitter at bedside for safety.  Denies cough, pain, sob.   Skin intact, wnl.   No concerns.

## 2025-02-04 NOTE — ASSESSMENT & PLAN NOTE
-fell after PT today - wife could  not get him up -EMS called  -CT cervical spine, CT head, CXR and XR pelvis as above  -fall precautions  -PT/OT consulted  -unclear if lightheaded/dizzy or mechanical fall  -EKG as above  -tele monitoring

## 2025-02-04 NOTE — ASSESSMENT & PLAN NOTE
-follows with Dr. Martinez outpt  -U/A as above-no active signs of UTI, no leukocytosis  -resume PTA [facility]

## 2025-02-04 NOTE — CARE PLAN
The patient's goals for the shift include  safety, no falls    The clinical goals for the shift include  safety, no falls.

## 2025-02-05 LAB — BACTERIA UR CULT: NORMAL

## 2025-02-05 PROCEDURE — 99232 SBSQ HOSP IP/OBS MODERATE 35: CPT

## 2025-02-05 PROCEDURE — 2500000004 HC RX 250 GENERAL PHARMACY W/ HCPCS (ALT 636 FOR OP/ED)

## 2025-02-05 PROCEDURE — 97116 GAIT TRAINING THERAPY: CPT | Mod: GP

## 2025-02-05 PROCEDURE — 97535 SELF CARE MNGMENT TRAINING: CPT | Mod: GO,CO

## 2025-02-05 PROCEDURE — 97530 THERAPEUTIC ACTIVITIES: CPT | Mod: GP

## 2025-02-05 PROCEDURE — 96372 THER/PROPH/DIAG INJ SC/IM: CPT

## 2025-02-05 PROCEDURE — 2500000001 HC RX 250 WO HCPCS SELF ADMINISTERED DRUGS (ALT 637 FOR MEDICARE OP): Performed by: NURSE PRACTITIONER

## 2025-02-05 PROCEDURE — 96372 THER/PROPH/DIAG INJ SC/IM: CPT | Performed by: NURSE PRACTITIONER

## 2025-02-05 PROCEDURE — 2500000004 HC RX 250 GENERAL PHARMACY W/ HCPCS (ALT 636 FOR OP/ED): Performed by: NURSE PRACTITIONER

## 2025-02-05 PROCEDURE — G0378 HOSPITAL OBSERVATION PER HR: HCPCS

## 2025-02-05 PROCEDURE — 2500000002 HC RX 250 W HCPCS SELF ADMINISTERED DRUGS (ALT 637 FOR MEDICARE OP, ALT 636 FOR OP/ED)

## 2025-02-05 PROCEDURE — 2500000002 HC RX 250 W HCPCS SELF ADMINISTERED DRUGS (ALT 637 FOR MEDICARE OP, ALT 636 FOR OP/ED): Performed by: NURSE PRACTITIONER

## 2025-02-05 PROCEDURE — 2500000001 HC RX 250 WO HCPCS SELF ADMINISTERED DRUGS (ALT 637 FOR MEDICARE OP)

## 2025-02-05 RX ORDER — OLANZAPINE 10 MG/2ML
2.5 INJECTION, POWDER, FOR SOLUTION INTRAMUSCULAR EVERY 8 HOURS PRN
Status: DISCONTINUED | OUTPATIENT
Start: 2025-02-05 | End: 2025-02-08 | Stop reason: HOSPADM

## 2025-02-05 RX ORDER — OLANZAPINE 2.5 MG/1
2.5 TABLET ORAL EVERY 8 HOURS PRN
Status: DISCONTINUED | OUTPATIENT
Start: 2025-02-05 | End: 2025-02-08 | Stop reason: HOSPADM

## 2025-02-05 RX ADMIN — OXYBUTYNIN CHLORIDE 5 MG: 5 TABLET ORAL at 09:02

## 2025-02-05 RX ADMIN — OLANZAPINE 2.5 MG: 10 INJECTION, POWDER, FOR SOLUTION INTRAMUSCULAR at 23:23

## 2025-02-05 RX ADMIN — ENOXAPARIN SODIUM 40 MG: 40 INJECTION SUBCUTANEOUS at 09:02

## 2025-02-05 RX ADMIN — PANTOPRAZOLE SODIUM 40 MG: 40 TABLET, DELAYED RELEASE ORAL at 05:03

## 2025-02-05 RX ADMIN — OLANZAPINE 2.5 MG: 2.5 TABLET, FILM COATED ORAL at 17:09

## 2025-02-05 RX ADMIN — OXYBUTYNIN CHLORIDE 5 MG: 5 TABLET ORAL at 20:50

## 2025-02-05 RX ADMIN — GABAPENTIN 300 MG: 300 CAPSULE ORAL at 20:50

## 2025-02-05 RX ADMIN — TRAMADOL HYDROCHLORIDE 50 MG: 50 TABLET ORAL at 23:00

## 2025-02-05 RX ADMIN — BUSPIRONE HYDROCHLORIDE 7.5 MG: 5 TABLET ORAL at 09:02

## 2025-02-05 RX ADMIN — OLANZAPINE 2.5 MG: 2.5 TABLET, FILM COATED ORAL at 02:03

## 2025-02-05 RX ADMIN — BUSPIRONE HYDROCHLORIDE 7.5 MG: 5 TABLET ORAL at 20:50

## 2025-02-05 RX ADMIN — MIRTAZAPINE 15 MG: 15 TABLET, FILM COATED ORAL at 20:50

## 2025-02-05 ASSESSMENT — PAIN SCALES - PAIN ASSESSMENT IN ADVANCED DEMENTIA (PAINAD)
TOTALSCORE: 0
BREATHING: NORMAL
FACIALEXPRESSION: FACIAL GRIMACING
TOTALSCORE: MEDICATION (SEE MAR)
BODYLANGUAGE: RELAXED
FACIALEXPRESSION: SMILING OR INEXPRESSIVE
CONSOLABILITY: UNABLE TO CONSOLE, DISTRACT OR REASSURE
NEGVOCALIZATION: OCCASIONAL MOAN/GROAN, LOW SPEECH, NEGATIVE/DISAPPROVING QUALITY
CONSOLABILITY: NO NEED TO CONSOLE
BODYLANGUAGE: RIGID, FISTS CLENCHED, KNEES UP, PUSHING/PULLING AWAY, STRIKES OUT

## 2025-02-05 ASSESSMENT — PAIN - FUNCTIONAL ASSESSMENT
PAIN_FUNCTIONAL_ASSESSMENT: FLACC (FACE, LEGS, ACTIVITY, CRY, CONSOLABILITY)
PAIN_FUNCTIONAL_ASSESSMENT: 0-10
PAIN_FUNCTIONAL_ASSESSMENT: 0-10

## 2025-02-05 ASSESSMENT — COGNITIVE AND FUNCTIONAL STATUS - GENERAL
EATING MEALS: A LITTLE
DRESSING REGULAR UPPER BODY CLOTHING: A LITTLE
HELP NEEDED FOR BATHING: A LOT
CLIMB 3 TO 5 STEPS WITH RAILING: A LOT
MOVING FROM LYING ON BACK TO SITTING ON SIDE OF FLAT BED WITH BEDRAILS: A LOT
MOBILITY SCORE: 21
DRESSING REGULAR LOWER BODY CLOTHING: A LOT
DRESSING REGULAR LOWER BODY CLOTHING: A LOT
CLIMB 3 TO 5 STEPS WITH RAILING: A LITTLE
MOVING TO AND FROM BED TO CHAIR: A LOT
HELP NEEDED FOR BATHING: A LOT
MOVING TO AND FROM BED TO CHAIR: A LITTLE
MOBILITY SCORE: 11
PERSONAL GROOMING: A LITTLE
DAILY ACTIVITIY SCORE: 21
HELP NEEDED FOR BATHING: A LITTLE
TOILETING: A LITTLE
TOILETING: A LOT
TURNING FROM BACK TO SIDE WHILE IN FLAT BAD: A LOT
STANDING UP FROM CHAIR USING ARMS: A LOT
STANDING UP FROM CHAIR USING ARMS: A LITTLE
PERSONAL GROOMING: A LITTLE
WALKING IN HOSPITAL ROOM: A LOT
MOBILITY SCORE: 18
DAILY ACTIVITIY SCORE: 16
CLIMB 3 TO 5 STEPS WITH RAILING: TOTAL
WALKING IN HOSPITAL ROOM: A LOT
WALKING IN HOSPITAL ROOM: A LITTLE
DRESSING REGULAR LOWER BODY CLOTHING: A LITTLE
STANDING UP FROM CHAIR USING ARMS: A LITTLE
TOILETING: A LOT
DAILY ACTIVITIY SCORE: 15
DRESSING REGULAR UPPER BODY CLOTHING: A LITTLE

## 2025-02-05 ASSESSMENT — PAIN SCALES - GENERAL
PAINLEVEL_OUTOF10: 0 - NO PAIN

## 2025-02-05 ASSESSMENT — ACTIVITIES OF DAILY LIVING (ADL): HOME_MANAGEMENT_TIME_ENTRY: 14

## 2025-02-05 NOTE — CARE PLAN
The patient's goals for the shift include Rest well & safety    The clinical goals for the shift include Monitor LOC, V/S, Labs & Urine output

## 2025-02-05 NOTE — CARE PLAN
Problem: Safety - Adult  Goal: Free from fall injury  Outcome: Progressing   The patient's goals for the shift include Rest well & safety    The clinical goals for the shift include Monitor LOC, V/S, Labs & Urine output    Over the shift, the patient did not make progress toward the following goals. Barriers to progression include patient AMS & frequent episodes of redirecting. Recommendations to address these barriers include safety sitter at patient's bedside.

## 2025-02-05 NOTE — PROGRESS NOTES
02/05/25 1235   Discharge Planning   Expected Discharge Disposition SNF  ([facility])     Spouse and patient updated and agreeable at this time   Direct submit team to start precert   Patient will be sitter from 24 hrs as of 2/6 0900     UPDATE 1245: [facility] no longer has a bed referral sent to [facility] at this time   Wife updated and agreeable     ** do not discharge without speaking to care coordination**

## 2025-02-05 NOTE — CARE PLAN
Problem: PT Problem  Goal: Pt will transition supine<>sit with mod I.   Outcome: Progressing  Goal: Pt will transfer sit<>stand with FWW & mod I.  Outcome: Progressing  Goal: Pt will ambulate >/=30 ft with FWW & close S.  Outcome: Progressing

## 2025-02-05 NOTE — PROGRESS NOTES
Physical Therapy    Physical Therapy Treatment    Patient Name: Enmanuel Bhatia  MRN: [id number]  Department: [address]  Room: [address] [place] [address]  Today's Date: 2/5/2025  Time Calculation  Start Time: 0958  Stop Time: 1035  Time Calculation (min): 37 min         Assessment/Plan   PT Assessment  Rehab Prognosis: Good  Barriers to Discharge Home: Caregiver assistance, Cognition needs, Physical needs  Caregiver Assistance: Caregiver assistance needed per identified barriers - however, level of patient's required assistance exceeds assistance available at home  Cognition Needs: 24hr supervision for safety awareness needed  Physical Needs: 24hr mobility assistance needed  End of Session Communication: Bedside nurse  Assessment Comment: Progressing slowly with functional mobility;  tolerance to activity improving slowly;  fall risk  End of Session Patient Position: Bed, 3 rail up, Alarm on  PT Plan  Inpatient/Swing Bed or Outpatient: Inpatient  PT Plan  Treatment/Interventions: Bed mobility, Transfer training, Gait training, Strengthening, Therapeutic exercise, Therapeutic activity, Balance training  PT Plan: Ongoing PT  PT Frequency: 3 times per week  PT Discharge Recommendations: Moderate intensity level of continued care  PT Recommended Transfer Status: Assist x1  PT - OK to Discharge: Yes (with skilled physical therapy services at next level of care)      General Visit Information:   PT  Visit  PT Received On: 02/05/25  General  Prior to Session Communication: Bedside nurse  Patient Position Received: Up in chair, Alarm off, caregiver present  General Comment: Nursing cleared patient for treatment.  Patient reports agreeable to treatment.    Subjective   Precautions:  Precautions  Medical Precautions: Fall precautions            Objective   Pain:  Pain Assessment  Pain Assessment: FLACC (Face, Legs, Activity, Cry, Consolability)  0-10 (Numeric) Pain Score: 0 - No pain  Cognition:  Cognition  Overall Cognitive Status: Impaired  "(Oriented x 1;  cooperative)  Coordination:  Movements are Fluid and Coordinated: No  Lower Body Coordination: Slower rate of movement xu LE  Postural Control:  Static Sitting Balance  Static Sitting-Balance Support: Bilateral upper extremity supported  Static Sitting-Level of Assistance: Close supervision  Static Sitting-Comment/Number of Minutes: Supervision with balance while sitting on side of bed  Static Standing Balance  Static Standing-Balance Support: Bilateral upper extremity supported  Static Standing-Level of Assistance: Moderate assistance  Static Standing-Comment/Number of Minutes: Assist with trunk support and balance during static standing with rolling walker          Activity Tolerance:  Activity Tolerance  Endurance: Decreased tolerance for upright activites  Activity Tolerance Comments: Fatigue  Treatments:                 Bed Mobility  Bed Mobility: Yes  Bed Mobility 1  Bed Mobility 1: Sitting to supine  Level of Assistance 1: Maximum assistance  Bed Mobility Comments 1: Assist with trunk and xu LE    Ambulation/Gait Training  Ambulation/Gait Training Performed: Yes  Ambulation/Gait Training 1  Surface 1: Level tile  Device 1: Rolling walker  Assistance 1: Moderate assistance  Comments/Distance (ft) 1: 15 feet x 2 with rolling walker and assist with trunk support, balance, and walker positioning;  patient ambulates with slow william, non-reciprocating gait pattern, flexion at trunk-hips-knees, poor walker positioning, and decreased step length xu LE \"shuffling steps\".  Transfers  Transfer: Yes  Transfer 1  Transfer From 1: Sit to  Transfer to 1: Stand  Technique 1: Sit to stand  Transfer Device 1: Walker  Transfer Level of Assistance 1: Moderate assistance  Trials/Comments 1: x 2 reps;  assist with elevating buttocks from sitting surface and positioning COG over KARL  Transfers 2  Transfer From 2: Stand to  Transfer to 2: Sit  Technique 2: Stand to sit  Transfer Device 2: Walker  Transfer Level " of Assistance 2: Moderate assistance  Trials/Comments 2: x 2 reps;  assist with trunk support and balance    Stairs  Stairs: No         Outcome Measures:  [facility] Basic Mobility  Turning from your back to your side while in a flat bed without using bedrails: A lot  Moving from lying on your back to sitting on the side of a flat bed without using bedrails: A lot  Moving to and from bed to chair (including a wheelchair): A lot  Standing up from a chair using your arms (e.g. wheelchair or bedside chair): A lot  To walk in hospital room: A lot  Climbing 3-5 steps with railing: Total  Basic Mobility - Total Score: 11    Education Documentation  No documentation found.  Education Comments  No comments found.        OP EDUCATION:       Encounter Problems       Encounter Problems (Active)       PT Problem       Pt will transition supine<>sit with mod I.  (Progressing)       Start:  02/04/25    Expected End:  02/25/25            Pt will transfer sit<>stand with FWW & mod I. (Progressing)       Start:  02/04/25    Expected End:  02/25/25            Pt will ambulate >/=30 ft with FWW & close S. (Progressing)       Start:  02/04/25    Expected End:  02/25/25

## 2025-02-05 NOTE — PROGRESS NOTES
Occupational Therapy    OT Treatment    Patient Name: Enmanuel Bhatia  MRN: [id number]  Department: [address]  Room: [address] [place] [address]  Today's Date: 2/5/2025  Time Calculation  Start Time: 1529  Stop Time: 1543  Time Calculation (min): 14 min        Assessment:  OT Assessment: pt tolerated treatment well and is progressing towards OT POC. Pt demonstrated increased endurance and ability to perform LB dressing tasks while seated on EOB which demonstrates progress towards OT goals. Pt would benefit from continued OT services to increase independence in LB dressing and other ADLs  Prognosis: Good  Barriers to Discharge Home: Caregiver assistance, Cognition needs, Physical needs  Caregiver Assistance: Caregiver assistance needed per identified barriers - however, level of patient's required assistance exceeds assistance available at home  Cognition Needs: Cognition-related high falls risk  Physical Needs: 24hr mobility assistance needed  Evaluation/Treatment Tolerance: Patient tolerated treatment well  Medical Staff Made Aware: Yes  End of Session Communication: Bedside nurse  End of Session Patient Position: Bed, 3 rail up, Alarm on (call light in reach and all needs met; RN made aware of OT session and pt position after session)  OT Assessment Results: Decreased ADL status, Decreased upper extremity range of motion, Decreased upper extremity strength, Decreased safe judgment during ADL, Decreased cognition, Decreased endurance, Decreased functional mobility  Prognosis: Good  Barriers to Discharge: Decreased caregiver support, Other (Comment) (cognition deficits)  Evaluation/Treatment Tolerance: Patient tolerated treatment well  Medical Staff Made Aware: Yes  Strengths: Attitude of self  Barriers to Participation: Comorbidities  Plan:  Treatment Interventions: ADL retraining, Functional transfer training, Patient/family training, Neuromuscular reeducation, Compensatory technique education  OT Frequency: 4 times per week  OT  Discharge Recommendations: Moderate intensity level of continued care  OT Recommended Transfer Status: Moderate assist, Assist of 1  OT - OK to Discharge: Yes  Treatment Interventions: ADL retraining, Functional transfer training, Patient/family training, Neuromuscular reeducation, Compensatory technique education    Subjective   Previous Visit Info:  OT Last Visit  OT Received On: 02/05/25  General:  General  Reason for Referral: decreased functional status due to fall  Referred By: Moon De La Paz MD  Past Medical History Relevant to Rehab: normal pressure hydrocephalus, BPH, Gait instability,  SBO, Memory impaired, GERD, parasthesis, arthritis  Prior to Session Communication: Bedside nurse  Patient Position Received: Bed, 3 rail up, Alarm on  General Comment: nursing cleared for OT treatment; pt pleasant and agreeable to OT treatment but displayed slight confusion  Precautions:  Hearing/Visual Limitations: hearing WFL, wears glasses  Medical Precautions: Fall precautions    Vital Signs Comment: during OT treatment while seated on EOB: HR 88     Pain:  Pain Assessment  Pain Assessment: 0-10  0-10 (Numeric) Pain Score: 0 - No pain    Objective    Cognition:  Cognition  Overall Cognitive Status: Impaired (pleasant and cooperative; oriented x1)  Orientation Level: Disoriented to place, Disoriented to time, Disoriented to situation    Activities of Daily Living:     LE Dressing  LE Dressing: Yes  Sock Level of Assistance: Moderate assistance  LE Dressing Where Assessed: Edge of bed  LE Dressing Comments: pt engaged in doffing socks while seated on EOB and utilizing figure four technqiue with increased time and effort. Pt engaged in donning socks while seated on EOB with mod A to thread onto BLEs; pt able to fully pull sock up once threaded onto BLEs. Educated on sock aide with poor follow through; will trial next session    Bed Mobility/Transfers:   Bed Mobility  Bed Mobility: Yes  Bed Mobility 1  Bed Mobility 1:  Sitting to supine  Level of Assistance 1: Moderate assistance  Bed Mobility Comments 1: Mod A for to guide trunk up into sitting position; pt able to bring BLEs to EOB  Bed Mobility 2  Bed Mobility  2: Sitting to supine  Level of Assistance 2: Maximum assistance  Bed Mobility Comments 2: Max A to bring BLEs into supine and to guide trunk into supine; verbal and tactile cues for optimal body positioning and alignement once in supine  Bed Mobility 3  Bed Mobility 3: Scooting  Level of Assistance 3: Minimum assistance  Bed Mobility Comments 3: Pt able to scoot towards HOB while in supine with min A and cueing for BLE and hand placement; perfromed with increased time and effort    Transfers  Transfer: No    Functional Mobility:  Functional Mobility  Functional Mobility Performed: No  Sitting Balance:  Dynamic Sitting Balance  Dynamic Sitting-Balance Support: Feet supported, No upper extremity supported  Dynamic Sitting-Level of Assistance: Contact guard, Minimum assistance  Dynamic Sitting-Balance: Forward lean, Reaching for objects, Reaching across midline  Dynamic Sitting-Comments: LB dressing tasks while seated on EOB with fair- balance    Outcome Measures:  [facility] Daily Activity  Putting on and taking off regular lower body clothing: A lot  Bathing (including washing, rinsing, drying): A lot  Putting on and taking off regular upper body clothing: A little  Toileting, which includes using toilet, bedpan or urinal: A lot  Taking care of personal grooming such as brushing teeth: A little  Eating Meals: A little  Daily Activity - Total Score: 15    Education Documentation  Precautions, taught by FELICIANO Wilkins at 2/5/2025  4:12 PM.  Learner: Patient  Readiness: Acceptance  Method: Explanation  Response: No Evidence of Learning, Needs Reinforcement  Comment: pt educated on OT POC, body mechanics, balance/safety techniques, precautions, and compensatory technqiues during ADL retraining      Goals:  Encounter Problems        Encounter Problems (Active)       Bathing       LTG - Patient will utilize adaptive techniques to bathe body with close supervision and set up (Progressing)       Start:  02/04/25    Expected End:  03/04/25               Dressings Lower Extremities       LTG - Patient will dress lower body with close supervision and set up (Progressing)       Start:  02/04/25    Expected End:  03/04/25               Functional Mobility       Perfrom functional mobility to and from the bathroom with 2ww and close supervision (Progressing)       Start:  02/04/25    Expected End:  03/04/25               OT Transfers       LTG - Patient will perform all functional transfers with close supervision and 2ww (Progressing)       Start:  02/04/25    Expected End:  03/04/25               Toileting       LTG - Patient will complete daily toileting tasks with close supervision and 2ww (Progressing)       Start:  02/04/25    Expected End:  03/04/25

## 2025-02-05 NOTE — PROGRESS NOTES
Enmanuel Bhatia is a 89 y.o. male on day 0 of admission presenting with Recurrent falls.      Subjective   Still confusd, less agitated       Objective     Last Recorded Vitals  /88 (BP Location: Left arm, Patient Position: Lying)   Pulse 97   Temp 36.6 °C (97.9 °F) (Axillary)   Resp 18   Wt 82 kg (180 lb 12.4 oz)   SpO2 96%   Intake/Output last 3 Shifts:    Intake/Output Summary (Last 24 hours) at 2/5/2025 1439  Last data filed at 2/5/2025 0519  Gross per 24 hour   Intake --   Output 1400 ml   Net -1400 ml       Admission Weight  Weight: 81.6 kg (180 lb) (02/03/25 1704)    Daily Weight  02/04/25 : 82 kg (180 lb 12.4 oz)    Image Results      Physical Exam    Relevant Results               Assessment/Plan                  Assessment & Plan  Recurrent falls    Fall  -fell after PT today - wife could  not get him up -EMS called  -CT cervical spine, CT head, CXR and XR pelvis as above  -fall precautions  -PT/OT consulted  -unclear if lightheaded/dizzy or mechanical fall  -EKG as above  -tele monitoring  Gait instability  -fall at home after PT  -follows with Dr. Quigley outpatient for multifactoral gait disorder with paresthesias  -resume PTA Neurontin  -fall precautions  -PT/OT consulted  -may need placement  Memory impairment  -poor historian  -history of normal pressure hydrocepalus  -cannot recall why he is in hospital   -states he drinks 2 beers nightly - wife states he hasn't drank any alcohol for  many years  Benign prostatic hyperplasia with lower urinary tract symptoms  See below  Mixed stress and urge urinary incontinence  -follows with Dr. Martinez outpt  -U/A as above-no active signs of UTI, no leukocytosis  -resume PTA [place]  History of depression  -resume PTA Buspirone  Murmur, cardiac  -noted +murmur on exam  -consider echo/cardiology consultation as needed  -tele monitoring  Off sitter, less agitated              Moon De La Paz MD

## 2025-02-05 NOTE — CONSULTS
Inpatient consult to Psychiatry  Consult performed by: JOSÉ Diaz-MARIAH  Consult ordered by: Moon De La Paz MD  Reason for consult: Agitation , Confusion        Referring Provider  Moon De La Paz MD    History Of Present Illness  Enmanuel Bhatia is a 89 y.o. male presenting with Recurrent falls.     Charts reviewed, and the patient’s case was discussed with the assigned RN and the primary provider.    The patient is an 89-year-old male with a history of dementia and major depressive disorder. According to the chart review, the patient has experienced recorded episodes of agitation, which have necessitated the use of medications and even restraints in the past.  He presents on this admission with recurrent falls, and psychiatry has been consulted to follow up and assist with the patient’s reported agitation and confusion.  The RN reports no behavioral issues throughout the morning. She notes that the patient remains confused but pleasant. During this encounter, the patient is sitting in a chair eating breakfast, with sitter present nearby for safety.  During this encounter, we attempted to perform an assessment. The patient remains confused about his location, unable to clearly state whether he is in the hospital, a restaurant, or a [place]. However, he maintained a positive attitude and was pleasant throughout the interaction, though he struggled to follow the conversation, often confabulating stories and providing irrelevant answers. The patient has a documented history of dementia and major depressive disorder from previous admissions. He is alert only to person but not to place, time, or situation. The patient was able to recall his birthday, demonstrating some cognitive awareness.   There were no current hallucinations noted during our assessment, and the patient denies any suicidal ideations. He reports some anxiety but denies feeling sad or depressed. He has a good appetite, as evidenced by him finishing  the eggs on his breakfast tray. During our encounter, the patient did not exhibit any aggressive or agitated behavior and appeared compliant with his medications, taking the pills as prescribed, confirmed by the nurse present during our assessment.     The patient was cooperative during the interaction and reported variable sleep quality but claimed that he sleeps enough, stating that he gets up early every morning to go to work. When asked about the type of work he does, he was unable to recall, reflecting confusion regarding his current circumstances and the context of his hospitalization.    The patient also reported alcohol use but later stated that his last drink was over a month ago, despite his wife's documentation indicating he has not consumed alcohol for years. He denies smoking or using any other drugs, responding with a smile when asked about it. The patient lives with his wife and feels safe at home, although he could not answer whether he has weapons. He reports having two children and mentions a half-brother but had difficulty to recall the names of his children. He emphasized that he lives with his wife, who is his support system.     The patient has a  background, stating that he served in the [employer]. We offered the patient the opportunity to ask questions, but he indicated that he had none at this time and thanked us for our visit. We informed him that we would follow up with him again tomorrow and that I would consider modifying some of his medications as necessary.    We appreciate being involved in this patient’s case and will continue to follow up with him again tomorrow to see if we can be of further assistance.    Past Medical History  He has a past medical history of Benign prostatic hyperplasia without lower urinary tract symptoms, Gait instability, GERD (gastroesophageal reflux disease), Hydrocephalus, Memory impairment, Paresthesias, Personal history of other diseases of the  "musculoskeletal system and connective tissue, Small bowel obstruction (Multi), and Urinary incontinence.    Surgical History  He has a past surgical history that includes Other surgical history (07/22/2019) and Colon surgery.     Social History  He reports that he has quit smoking. His smoking use included cigarettes. He has never used smokeless tobacco. He reports that he does not currently use alcohol. He reports that he does not use drugs.     Allergies  Patient has no known allergies.    Review of Systems   Unable to perform ROS: Dementia       Psychiatric ROS - Adult  Anxiety: General Anxiety Disorder (MILTON)MILTON Behaviors: difficulty concentrating and restlessness  Depression: sleep decreased  and withdrawn  Delirium: acute inattention, disorientation, sleep-wake abnormal, and waxing and waning  Psychosis: negative  Anna: negative  Safety Issues: None  Psychiatric ROS Comment: As noted    Physical Exam    Involuntary Movement Assessment  Facial and Oral Movements  No tremors or tics noted     Extremity Movements              No involuntary shaking or jerking     Trunk Movements              Stable posture sitting in the chair.      Mental Status Examination    General Appearance: Fairly groomed.  Gait/Station: Within normal limits  Speech: Slow speech  Mood: \"I am Ok\"  Affect: Congruent with mood and topic of conversation  Thought Process: Tangential  Thought Associations: Severe loosening of associations and Occasional derailment  Thought Content: tangential  Perception: No perceptual abnormalities noted  Level of Consciousness:  Altered and confused  Orientation: Not oriented to place, time/date, situation, day of week, month of year, and year  Attention and Concentration: Unable to assess due to  patient’s confusion, we are unable to effectively assess their attention and concentration at this time.     Recent Memory: Impaired as evidenced by difficulty recalling details from the past 24 hours  and Impaired " "as evidenced by confabulation   Remote Memory: Impaired as evidenced by difficulty recalling previous medical issues , Impaired as evidenced by difficulty recalling events from childhood , and Impaired as evidenced by confabulation   Executive function: Impaired as evidenced by Inattention and poor impulse control due to confusion   Language: Word finding difficulties  Fund of knowledge: Severely limited  Insight: Severely impaired, as evidenced by inability to understand and lack of awareness    Judgment: Limited, as evidence by inability to reason through medical decision making and diminished ability to reason      Psychiatric Risk Assessment  Violence Risk Assessment: male and other current mental state of confusion  Acute Risk of Harm to Others is Considered: low   Suicide Risk Assessment: age > 65 yrs old, , and male  Protective Factors against Suicide: positive family relationships  Acute Risk of Harm to Self is Considered: low    Last Recorded Vitals  Blood pressure 131/80, pulse 85, temperature 36.8 °C (98.2 °F), temperature source Axillary, resp. rate 18, height 1.778 m (5' 10\"), weight 81.6 kg (180 lb), SpO2 97%.    Relevant Results      Results for orders placed or performed during the hospital encounter of 02/03/25 (from the past 96 hours)   Lavender Top   Result Value Ref Range    Extra Tube Hold for add-ons.    Comprehensive metabolic panel   Result Value Ref Range    Glucose 98 74 - 99 mg/dL    Sodium 135 (L) 136 - 145 mmol/L    Potassium 4.1 3.5 - 5.3 mmol/L    Chloride 101 98 - 107 mmol/L    Bicarbonate 29 21 - 32 mmol/L    Anion Gap 9 (L) 10 - 20 mmol/L    Urea Nitrogen 12 6 - 23 mg/dL    Creatinine 1.00 0.50 - 1.30 mg/dL    eGFR 72 >60 mL/min/1.73m*2    Calcium 9.1 8.6 - 10.3 mg/dL    Albumin 3.8 3.4 - 5.0 g/dL    Alkaline Phosphatase 73 33 - 136 U/L    Total Protein 6.0 (L) 6.4 - 8.2 g/dL    AST 14 9 - 39 U/L    Bilirubin, Total 0.5 0.0 - 1.2 mg/dL    ALT 11 10 - 52 U/L   Magnesium "   Result Value Ref Range    Magnesium 1.82 1.60 - 2.40 mg/dL   Creatine Kinase   Result Value Ref Range    Creatine Kinase 72 0 - 325 U/L   CBC and Auto Differential   Result Value Ref Range    WBC 6.2 4.4 - 11.3 x10*3/uL    nRBC 0.0 0.0 - 0.0 /100 WBCs    RBC 4.13 (L) 4.50 - 5.90 x10*6/uL    Hemoglobin 13.0 (L) 13.5 - 17.5 g/dL    Hematocrit 37.6 (L) 41.0 - 52.0 %    MCV 91 80 - 100 fL    MCH 31.5 26.0 - 34.0 pg    MCHC 34.6 32.0 - 36.0 g/dL    RDW 12.9 11.5 - 14.5 %    Platelets 225 150 - 450 x10*3/uL    Neutrophils % 67.2 40.0 - 80.0 %    Immature Granulocytes %, Automated 0.6 0.0 - 0.9 %    Lymphocytes % 21.0 13.0 - 44.0 %    Monocytes % 7.4 2.0 - 10.0 %    Eosinophils % 3.0 0.0 - 6.0 %    Basophils % 0.8 0.0 - 2.0 %    Neutrophils Absolute 4.18 1.60 - 5.50 x10*3/uL    Immature Granulocytes Absolute, Automated 0.04 0.00 - 0.50 x10*3/uL    Lymphocytes Absolute 1.31 0.80 - 3.00 x10*3/uL    Monocytes Absolute 0.46 0.05 - 0.80 x10*3/uL    Eosinophils Absolute 0.19 0.00 - 0.40 x10*3/uL    Basophils Absolute 0.05 0.00 - 0.10 x10*3/uL   Troponin I, High Sensitivity, Initial   Result Value Ref Range    Troponin I, High Sensitivity 5 0 - 20 ng/L   Urinalysis with Reflex Culture and Microscopic   Result Value Ref Range    Color, Urine Light-Yellow Light-Yellow, Yellow, Dark-Yellow    Appearance, Urine Clear Clear    Specific Gravity, Urine 1.006 1.005 - 1.035    pH, Urine 6.5 5.0, 5.5, 6.0, 6.5, 7.0, 7.5, 8.0    Protein, Urine NEGATIVE NEGATIVE, 10 (TRACE), 20 (TRACE) mg/dL    Glucose, Urine Normal Normal mg/dL    Blood, Urine NEGATIVE NEGATIVE    Ketones, Urine NEGATIVE NEGATIVE mg/dL    Bilirubin, Urine NEGATIVE NEGATIVE    Urobilinogen, Urine Normal Normal mg/dL    Nitrite, Urine NEGATIVE NEGATIVE    Leukocyte Esterase, Urine 75 Manjinder/uL (A) NEGATIVE   Extra Urine Gray Tube   Result Value Ref Range    Extra Tube Hold for add-ons.    Microscopic Only, Urine   Result Value Ref Range    WBC, Urine 6-10 (A) 1-5, NONE /HPF     RBC, Urine NONE NONE, 1-2, 3-5 /HPF    Bacteria, Urine 1+ (A) NONE SEEN /HPF   Urine Culture    Specimen: Clean Catch/Voided; Urine   Result Value Ref Range    Urine Culture Culture in progress    ECG 12 lead   Result Value Ref Range    Ventricular Rate 88 BPM    Atrial Rate 88 BPM    OR Interval 146 ms    QRS Duration 90 ms    QT Interval 362 ms    QTC Calculation(Bazett) 438 ms    P Axis 45 degrees    R Axis -37 degrees    T Axis 57 degrees    QRS Count 15 beats    Q Onset 214 ms    P Onset 141 ms    P Offset 188 ms    T Offset 395 ms    QTC Fredericia 411 ms   Troponin, High Sensitivity, 1 Hour   Result Value Ref Range    Troponin I, High Sensitivity 6 0 - 20 ng/L      Reviewed Pertinent Labs     Assessment/Plan   Assessment & Plan  Recurrent falls    Fall    Gait instability    Memory impairment    Benign prostatic hyperplasia with lower urinary tract symptoms    Mixed stress and urge urinary incontinence    History of depression    Murmur, cardiac    The patient is an 89-year-old male with a history of dementia and major depressive disorder. According to the chart review, the patient has experienced recorded episodes of agitation, which have necessitated the use of medications and even restraints in the past.  He presents on this admission with recurrent falls, and psychiatry has been consulted to follow up and assist with the patient’s reported agitation and confusion. Assessment and intervention provided to address this symptoms and ensure his safety while here.    Depression             -  Continue Remeron 15 mg PO at bedtime   2.  Anxiety              - Continue Buspar  Increase to 7.5 mg PO, BID, recommended dose to manage anxiety   - Continue Gabapentin 300 mg PO can also help with anxiety  3.   Agitation     - Stop Haldol  - Start Zyprexa 2.5 mg PO PRN/ 2.5 mg IM if refusing PO to help with behavioral disturbances and impulse control.    - Would also recommend STRICT delirium precautions:      --  Minimize use of benzos, opiates, anticholinergics, as these may worsen mental status   -- Would use caution with narcotic pain medications   -- Would still adequately controlling pain, as uncontrolled pain is also a risk factor for delirium   -- Reinforce sleep hygiene; encourage patient to stay awake during the day   -- Keep curtains/blinds open during the day and closed at night.   -- Would recommend reorienting/redirecting patient as much as possible,    -- Aim for consistent staffing, familiar objects, avoiding bright lights and loud noises, etc    The patient currently does not meet the criteria for the inpatient psychiatric treatment.     Thank you for allowing us to participate in the care of this patient. We will continue to follow again tomorrow.     I personally spent 75 minutes today providing care for this patient, including preparation, face to face time, documentation and other services such as review of medical records, diagnostic result, collaboration with primary team and providers, patient education, counseling, coordination of care as specified in the encounter.      Parts of this chart have been completed using voice recognition software.  Please excuse any errors of transcription.  Despite the medical decision making time stamp, my medical decision making has taken place during the patient's entire visit.  Thought process and reason for plan has been formulated from the time that I saw the patient until the time of disposition and is not specific to one specific moment during their visit and furthermore the medical decision making encompasses the entire chart and not only that represented in this note.     Priscilla Reece, JEREMYP-BC  Psychiatry, [facility]  1* contact: [place] preferred

## 2025-02-05 NOTE — PROGRESS NOTES
Enmanuel Bhatia is a 89 y.o. male on day 0 of admission presenting with Recurrent falls.      Subjective     The chart was reviewed, and the patient case was discussed with the assigned RN. The RN reported no behavioral issues from the patient throughout the morning, noting his compliance with medication and care provided.     During our face-to-face encounter, the patient's wife was present at the bedside. She indicated that the patient is experiencing cognitive decline but asserted that he is generally fine. However, she expressed concern about his frequent falls as we attempted to assess him. Throughout the interaction, the patient's wife interfered by answering on his behalf and repeatedly mentioned that the patient appeared agitated, despite no such presentation being observed during our assessment. We acknowledged her concerns and redirected our focus toward the patient.    The patient exhibited confusion regarding his location, believing he was in [place], [place], necessitating reorientation to the current location, date, and time. He stated that he is 88 years old, although he is actually 89, yet he was able to correctly articulate his date of birth. The patient recognized his wife and acknowledged their 61 years of marriage. He demonstrated difficulty with situational awareness, miscounting the number of people in the room as two or three when there were four present. At one point, he gestured towards myself, his wife, and the nurse, indicating his awareness of those present.    Night shift reports indicated that the patient had been somewhat restless, attempting to pull tubes, but no significant signs of aggression were noted. He exhibited no auditory or visual hallucinations and appeared plain confused. The patient denied suicidal ideation and seemed generally content. However, his wife maintained that he has been swearing and described him as the most aggressive she has ever seen, although the patient  "remained calm during our interaction, attempting to follow the conversation despite difficulties in comprehension and expression.    The RN reported that the patient was calm and pleasant overall, although he became more restless in his wife's presence. The patient had engaged with therapy earlier in the day and was able to follow basic instructions from the nursing staff. We encouraged both the patient and his wife to ask questions, noting that the wife had difficulty understanding the responses. She mentioned that the patient has not been diagnosed with dementia and expressed confidence in her ability to care for him independently at home, showing some resistance to the idea of outside assistance. Additionally, she reported that their son is the power of , and we kindly reminded her to ensure that the POA paperwork is included in the patient’s file during her next conversation with him.    The patient has exhibited some improvement; however, confusion persists. Notably, he has required no sitter since yesterday. We determined that he would not benefit from inpatient psychiatric care but would be better suited for a placement in a memory unit moving forward. We will continue to monitor the patient while he remains hospitalized to assess if any further adjustments to his medication regimen are necessary. We appreciate being involved in this patient’s case.       Objective     Last Recorded Vitals  Blood pressure 131/88, pulse 97, temperature 36.6 °C (97.9 °F), temperature source Axillary, resp. rate 18, height 1.778 m (5' 10\"), weight 82 kg (180 lb 12.4 oz), SpO2 96%.    Review of Systems   Unable to perform ROS: Dementia       Psychiatric ROS - Adult  Anxiety: General Anxiety Disorder (MILTON)MILTON Behaviors: difficulty concentrating and restlessness  Depression: sleep decreased  and withdrawn  Delirium: acute inattention, disorientation, sleep-wake abnormal, and waxing and waning  Psychosis: negative  Anna: " "negative  Safety Issues: None  Psychiatric ROS Comment: As noted    Physical Exam      Mental Status Exam  General Appearance: Fairly groomed.  Gait/Station: Within normal limits  Speech: Slow speech  Mood: \"I am good\"  Affect: Congruent with mood and topic of conversation  Thought Process: Tangential  Thought Associations: Severe loosening of associations and Occasional derailment  Thought Content: tangential  Perception: No perceptual abnormalities noted  Level of Consciousness:  Altered and confused  Orientation: Not oriented to place, time/date, situation, day of week, month of year, and year  Attention and Concentration: Unable to assess due to  patient’s confusion, we are unable to effectively assess their attention and concentration at this time.     Recent Memory: Impaired as evidenced by difficulty recalling details from the past 24 hours  and Impaired as evidenced by confabulation   Remote Memory: Impaired as evidenced by difficulty recalling previous medical issues , Impaired as evidenced by difficulty recalling events from childhood , and Impaired as evidenced by confabulation   Executive function: Impaired as evidenced by Inattention and poor impulse control due to confusion   Language: Word finding difficulties  Fund of knowledge: Severely limited  Insight: Severely impaired, as evidenced by inability to understand and lack of awareness    Judgment: Limited, as evidence by inability to reason through medical decision making and diminished ability to reason    Psychiatric Risk Assessment  Violence Risk Assessment: male and other current mental state of confusion  Acute Risk of Harm to Others is Considered: low   Suicide Risk Assessment: age > 65 yrs old, , and male  Protective Factors against Suicide: positive family relationships  Acute Risk of Harm to Self is Considered: low    Relevant Results               Assessment/Plan   Assessment & Plan  Recurrent falls    Fall    Gait " instability    Memory impairment    Benign prostatic hyperplasia with lower urinary tract symptoms    Mixed stress and urge urinary incontinence    History of depression    Murmur, cardiac    Depression             -  Continue Remeron 15 mg PO at bedtime   2.  Anxiety              - Continue Buspar  7.5 mg PO, BID, recommended dose to manage anxiety, denies any side effects from dosage increase              - Continue Gabapentin 300 mg PO can also help with anxiety  3.   Agitation   - Can give Zyprexa 2.5 mg PO PRN  or  2.5 mg IM if refusing PO to help with behavioral disturbances and poor impulse control.     - Would also recommend STRICT delirium precautions:       -- Minimize use of benzos, opiates, anticholinergics, as these may worsen mental status   -- Would use caution with narcotic pain medications   -- Would still adequately controlling pain, as uncontrolled pain is also a risk factor for delirium   -- Reinforce sleep hygiene; encourage patient to stay awake during the day   -- Keep curtains/blinds open during the day and closed at night.   -- Would recommend reorienting/redirecting patient as much as possible,    -- Aim for consistent staffing, familiar objects, avoiding bright lights and loud noises, etc    The patient currently does not meet the criteria for the inpatient psychiatric treatment.      Thank you for allowing us to participate in the care of this patient. We will continue to follow again tomorrow.        I personally spent 37 minutes today providing care for this patient, including preparation, face to face time, documentation and other services such as review of medical records, diagnostic result, collaboration with primary team and providers, patient education, counseling, coordination of care as specified in the encounter.       Parts of this chart have been completed using voice recognition software.  Please excuse any errors of transcription.  Despite the medical decision making time  stamp, my medical decision making has taken place during the patient's entire visit.  Thought process and reason for plan has been formulated from the time that I saw the patient until the time of disposition and is not specific to one specific moment during their visit and furthermore the medical decision making encompasses the entire chart and not only that represented in this note.      Priscilla Reece, PMLUPEP-BC  Psychiatry, [facility]  1* contact: [place] preferred

## 2025-02-06 ENCOUNTER — APPOINTMENT (OUTPATIENT)
Dept: HOME HEALTH SERVICES | Facility: HOME HEALTH | Age: OVER 89
End: 2025-02-06
Payer: MEDICARE

## 2025-02-06 LAB
ALBUMIN SERPL BCP-MCNC: 3.5 G/DL (ref 3.4–5)
ALP SERPL-CCNC: 73 U/L (ref 33–136)
ALT SERPL W P-5'-P-CCNC: 10 U/L (ref 10–52)
ANION GAP SERPL CALCULATED.3IONS-SCNC: 9 MMOL/L (ref 10–20)
AST SERPL W P-5'-P-CCNC: 15 U/L (ref 9–39)
BASOPHILS # BLD AUTO: 0.04 X10*3/UL (ref 0–0.1)
BASOPHILS NFR BLD AUTO: 0.5 %
BILIRUB SERPL-MCNC: 0.8 MG/DL (ref 0–1.2)
BUN SERPL-MCNC: 16 MG/DL (ref 6–23)
CALCIUM SERPL-MCNC: 9 MG/DL (ref 8.6–10.3)
CHLORIDE SERPL-SCNC: 104 MMOL/L (ref 98–107)
CO2 SERPL-SCNC: 29 MMOL/L (ref 21–32)
CREAT SERPL-MCNC: 1.03 MG/DL (ref 0.5–1.3)
EGFRCR SERPLBLD CKD-EPI 2021: 69 ML/MIN/1.73M*2
EOSINOPHIL # BLD AUTO: 0.14 X10*3/UL (ref 0–0.4)
EOSINOPHIL NFR BLD AUTO: 1.7 %
ERYTHROCYTE [DISTWIDTH] IN BLOOD BY AUTOMATED COUNT: 12.9 % (ref 11.5–14.5)
ERYTHROCYTE [DISTWIDTH] IN BLOOD BY AUTOMATED COUNT: 12.9 % (ref 11.5–14.5)
FLUAV RNA RESP QL NAA+PROBE: NOT DETECTED
FLUBV RNA RESP QL NAA+PROBE: NOT DETECTED
GLUCOSE SERPL-MCNC: 97 MG/DL (ref 74–99)
HCT VFR BLD AUTO: 41.6 % (ref 41–52)
HCT VFR BLD AUTO: 41.6 % (ref 41–52)
HGB BLD-MCNC: 14.5 G/DL (ref 13.5–17.5)
HGB BLD-MCNC: 14.5 G/DL (ref 13.5–17.5)
IMM GRANULOCYTES # BLD AUTO: 0.03 X10*3/UL (ref 0–0.5)
IMM GRANULOCYTES NFR BLD AUTO: 0.4 % (ref 0–0.9)
LYMPHOCYTES # BLD AUTO: 1.1 X10*3/UL (ref 0.8–3)
LYMPHOCYTES NFR BLD AUTO: 13.6 %
MCH RBC QN AUTO: 31.7 PG (ref 26–34)
MCH RBC QN AUTO: 31.7 PG (ref 26–34)
MCHC RBC AUTO-ENTMCNC: 34.9 G/DL (ref 32–36)
MCHC RBC AUTO-ENTMCNC: 34.9 G/DL (ref 32–36)
MCV RBC AUTO: 91 FL (ref 80–100)
MCV RBC AUTO: 91 FL (ref 80–100)
MONOCYTES # BLD AUTO: 0.7 X10*3/UL (ref 0.05–0.8)
MONOCYTES NFR BLD AUTO: 8.6 %
NEUTROPHILS # BLD AUTO: 6.1 X10*3/UL (ref 1.6–5.5)
NEUTROPHILS NFR BLD AUTO: 75.2 %
NRBC BLD-RTO: 0 /100 WBCS (ref 0–0)
NRBC BLD-RTO: 0 /100 WBCS (ref 0–0)
PLATELET # BLD AUTO: 228 X10*3/UL (ref 150–450)
PLATELET # BLD AUTO: 228 X10*3/UL (ref 150–450)
POTASSIUM SERPL-SCNC: 4.4 MMOL/L (ref 3.5–5.3)
PROT SERPL-MCNC: 5.8 G/DL (ref 6.4–8.2)
RBC # BLD AUTO: 4.57 X10*6/UL (ref 4.5–5.9)
RBC # BLD AUTO: 4.57 X10*6/UL (ref 4.5–5.9)
RSV RNA RESP QL NAA+PROBE: NOT DETECTED
SARS-COV-2 RNA RESP QL NAA+PROBE: NOT DETECTED
SODIUM SERPL-SCNC: 138 MMOL/L (ref 136–145)
WBC # BLD AUTO: 8.3 X10*3/UL (ref 4.4–11.3)
WBC # BLD AUTO: 8.3 X10*3/UL (ref 4.4–11.3)

## 2025-02-06 PROCEDURE — 97535 SELF CARE MNGMENT TRAINING: CPT | Mod: GO

## 2025-02-06 PROCEDURE — 2500000004 HC RX 250 GENERAL PHARMACY W/ HCPCS (ALT 636 FOR OP/ED): Performed by: NURSE PRACTITIONER

## 2025-02-06 PROCEDURE — 2500000002 HC RX 250 W HCPCS SELF ADMINISTERED DRUGS (ALT 637 FOR MEDICARE OP, ALT 636 FOR OP/ED)

## 2025-02-06 PROCEDURE — 96372 THER/PROPH/DIAG INJ SC/IM: CPT | Performed by: NURSE PRACTITIONER

## 2025-02-06 PROCEDURE — 99232 SBSQ HOSP IP/OBS MODERATE 35: CPT

## 2025-02-06 PROCEDURE — 2500000001 HC RX 250 WO HCPCS SELF ADMINISTERED DRUGS (ALT 637 FOR MEDICARE OP)

## 2025-02-06 PROCEDURE — 85027 COMPLETE CBC AUTOMATED: CPT | Performed by: INTERNAL MEDICINE

## 2025-02-06 PROCEDURE — 87637 SARSCOV2&INF A&B&RSV AMP PRB: CPT | Performed by: INTERNAL MEDICINE

## 2025-02-06 PROCEDURE — 2500000002 HC RX 250 W HCPCS SELF ADMINISTERED DRUGS (ALT 637 FOR MEDICARE OP, ALT 636 FOR OP/ED): Performed by: NURSE PRACTITIONER

## 2025-02-06 PROCEDURE — 84075 ASSAY ALKALINE PHOSPHATASE: CPT | Performed by: INTERNAL MEDICINE

## 2025-02-06 PROCEDURE — 87086 URINE CULTURE/COLONY COUNT: CPT | Mod: WESLAB | Performed by: INTERNAL MEDICINE

## 2025-02-06 PROCEDURE — 97530 THERAPEUTIC ACTIVITIES: CPT | Mod: GO

## 2025-02-06 PROCEDURE — 99223 1ST HOSP IP/OBS HIGH 75: CPT | Performed by: STUDENT IN AN ORGANIZED HEALTH CARE EDUCATION/TRAINING PROGRAM

## 2025-02-06 PROCEDURE — 36415 COLL VENOUS BLD VENIPUNCTURE: CPT | Performed by: INTERNAL MEDICINE

## 2025-02-06 PROCEDURE — G0378 HOSPITAL OBSERVATION PER HR: HCPCS

## 2025-02-06 PROCEDURE — 85025 COMPLETE CBC W/AUTO DIFF WBC: CPT | Performed by: INTERNAL MEDICINE

## 2025-02-06 PROCEDURE — 2500000001 HC RX 250 WO HCPCS SELF ADMINISTERED DRUGS (ALT 637 FOR MEDICARE OP): Performed by: NURSE PRACTITIONER

## 2025-02-06 RX ORDER — OLANZAPINE 5 MG/1
5 TABLET, ORALLY DISINTEGRATING ORAL NIGHTLY
Status: DISCONTINUED | OUTPATIENT
Start: 2025-02-06 | End: 2025-02-08 | Stop reason: HOSPADM

## 2025-02-06 RX ADMIN — OXYBUTYNIN CHLORIDE 5 MG: 5 TABLET ORAL at 12:39

## 2025-02-06 RX ADMIN — BUSPIRONE HYDROCHLORIDE 7.5 MG: 5 TABLET ORAL at 20:31

## 2025-02-06 RX ADMIN — OLANZAPINE 5 MG: 5 TABLET, ORALLY DISINTEGRATING ORAL at 20:31

## 2025-02-06 RX ADMIN — MIRTAZAPINE 15 MG: 15 TABLET, FILM COATED ORAL at 20:31

## 2025-02-06 RX ADMIN — PANTOPRAZOLE SODIUM 40 MG: 40 TABLET, DELAYED RELEASE ORAL at 05:04

## 2025-02-06 RX ADMIN — GABAPENTIN 300 MG: 300 CAPSULE ORAL at 20:31

## 2025-02-06 RX ADMIN — OXYBUTYNIN CHLORIDE 5 MG: 5 TABLET ORAL at 20:31

## 2025-02-06 RX ADMIN — ENOXAPARIN SODIUM 40 MG: 40 INJECTION SUBCUTANEOUS at 12:39

## 2025-02-06 RX ADMIN — BUSPIRONE HYDROCHLORIDE 7.5 MG: 5 TABLET ORAL at 12:39

## 2025-02-06 ASSESSMENT — COGNITIVE AND FUNCTIONAL STATUS - GENERAL
MOVING TO AND FROM BED TO CHAIR: A LITTLE
TOILETING: A LOT
DRESSING REGULAR UPPER BODY CLOTHING: A LITTLE
STANDING UP FROM CHAIR USING ARMS: A LITTLE
WALKING IN HOSPITAL ROOM: A LOT
DRESSING REGULAR LOWER BODY CLOTHING: A LOT
DAILY ACTIVITIY SCORE: 16
DRESSING REGULAR UPPER BODY CLOTHING: A LITTLE
WALKING IN HOSPITAL ROOM: A LOT
MOBILITY SCORE: 17
HELP NEEDED FOR BATHING: A LOT
EATING MEALS: A LITTLE
DAILY ACTIVITIY SCORE: 16
HELP NEEDED FOR BATHING: A LOT
MOVING TO AND FROM BED TO CHAIR: A LITTLE
PERSONAL GROOMING: A LITTLE
CLIMB 3 TO 5 STEPS WITH RAILING: TOTAL
HELP NEEDED FOR BATHING: A LOT
STANDING UP FROM CHAIR USING ARMS: A LITTLE
CLIMB 3 TO 5 STEPS WITH RAILING: TOTAL
TOILETING: A LOT
DAILY ACTIVITIY SCORE: 14
DRESSING REGULAR LOWER BODY CLOTHING: A LOT
DRESSING REGULAR LOWER BODY CLOTHING: A LOT
MOBILITY SCORE: 17
PERSONAL GROOMING: A LITTLE
DRESSING REGULAR UPPER BODY CLOTHING: A LOT
TOILETING: A LOT
PERSONAL GROOMING: A LITTLE

## 2025-02-06 ASSESSMENT — PAIN SCALES - GENERAL
PAINLEVEL_OUTOF10: 0 - NO PAIN

## 2025-02-06 ASSESSMENT — PAIN SCALES - PAIN ASSESSMENT IN ADVANCED DEMENTIA (PAINAD)
CONSOLABILITY: DISTRACTED OR REASSURED BY VOICE/TOUCH
BREATHING: NORMAL
BODYLANGUAGE: TENSE, DISTRESSED PACING, FIDGETING
FACIALEXPRESSION: SMILING OR INEXPRESSIVE
TOTALSCORE: 2

## 2025-02-06 ASSESSMENT — PAIN - FUNCTIONAL ASSESSMENT: PAIN_FUNCTIONAL_ASSESSMENT: 0-10

## 2025-02-06 ASSESSMENT — PAIN SCALES - WONG BAKER: WONGBAKER_NUMERICALRESPONSE: NO HURT

## 2025-02-06 ASSESSMENT — ACTIVITIES OF DAILY LIVING (ADL): HOME_MANAGEMENT_TIME_ENTRY: 11

## 2025-02-06 NOTE — CONSULTS
"Neurology Consult    History Of Present Illness:  Mr. Bhatia is a 89 y.o.  male admitted with fall , 2/3/2025 LOS day 0, consulted for confusion/dementia.    Pt with a past medical history of possible pressure hydrocephalus, dementia, gait disorder, BPH, and GERD. Pt is a poor historian so information was gathered via chart review. Pt was found on the living room floor by his wife and she couldn't get him up so she called EMS. Pt is followed by his neurologist Dr Quigley for dementia, normal pressure hydrocephalus and gait disorder. Was seen by Dr Berg in 2019 who wanted to perform a LP and see if there was any improvement in his condition. Pt never followed through with the testing.     Did speak to the daughter Farhana to determine patient baseline function. Daughter states that he has \"water on the brain\" that causes him several issues. He can remember things from the past but difficulty with remember anything recent, but is able to recognize her and the grandchildren faces and names. States that patient spends most of his time sitting in the chair watching TV. Does use a walker to ambulate but has significant difficulty. He doesn't drive, doesn't manage the finances and needs assistance with dressing. He is incontinent of urine.      Past Medical History  Past Medical History:   Diagnosis Date    Benign prostatic hyperplasia without lower urinary tract symptoms     Enlarged prostate    Gait instability     GERD (gastroesophageal reflux disease)     Hydrocephalus     Memory impairment     Paresthesias     Personal history of other diseases of the musculoskeletal system and connective tissue     History of arthritis    Small bowel obstruction (Multi)     Urinary incontinence      Surgical History  Past Surgical History:   Procedure Laterality Date    COLON SURGERY      OTHER SURGICAL HISTORY  07/22/2019    No history of surgery     Social History  Social History     Tobacco Use    Smoking status: Former     Types: " "Cigarettes    Smokeless tobacco: Never   Substance Use Topics    Alcohol use: Not Currently    Drug use: Never     Meds and Allergies  Reviewed in EMR    Objective   Blood pressure 129/70, pulse 86, temperature 36.8 °C (98.2 °F), temperature source Oral, resp. rate 18, height 1.778 m (5' 10\"), weight 82 kg (180 lb 12.4 oz), SpO2 93%.    Neurological Exam:  Neurological Exam:  General: NAD, cooperative, pleasantly confused   Neuro:  Awake alert and oriented to person, (thinks it 1999, and he is up on the hill) language normal, no dysarthria    Visual fields full  EOM full range, no nystagmus   Smile sym  Tongue midline   Strength 5/5 in BUE and BLE proximal and distally   Tone and bulk normal   Reflexes:      R          L  BR:               2          2  Biceps:         2          2  Knee:           2          2  Ankle:          2          2  Toes down  Sensation intact to light touch  FTN ataxic bilaterally, heel to shin ataxic bilaterally   Gait: deferred       Reviewed relevant results, independent review of imaging:   Results for orders placed or performed during the hospital encounter of 02/03/25 (from the past 24 hours)   CBC   Result Value Ref Range    WBC 8.3 4.4 - 11.3 x10*3/uL    nRBC 0.0 0.0 - 0.0 /100 WBCs    RBC 4.57 4.50 - 5.90 x10*6/uL    Hemoglobin 14.5 13.5 - 17.5 g/dL    Hematocrit 41.6 41.0 - 52.0 %    MCV 91 80 - 100 fL    MCH 31.7 26.0 - 34.0 pg    MCHC 34.9 32.0 - 36.0 g/dL    RDW 12.9 11.5 - 14.5 %    Platelets 228 150 - 450 x10*3/uL   Comprehensive metabolic panel   Result Value Ref Range    Glucose 97 74 - 99 mg/dL    Sodium 138 136 - 145 mmol/L    Potassium 4.4 3.5 - 5.3 mmol/L    Chloride 104 98 - 107 mmol/L    Bicarbonate 29 21 - 32 mmol/L    Anion Gap 9 (L) 10 - 20 mmol/L    Urea Nitrogen 16 6 - 23 mg/dL    Creatinine 1.03 0.50 - 1.30 mg/dL    eGFR 69 >60 mL/min/1.73m*2    Calcium 9.0 8.6 - 10.3 mg/dL    Albumin 3.5 3.4 - 5.0 g/dL    Alkaline Phosphatase 73 33 - 136 U/L    Total Protein " 5.8 (L) 6.4 - 8.2 g/dL    AST 15 9 - 39 U/L    Bilirubin, Total 0.8 0.0 - 1.2 mg/dL    ALT 10 10 - 52 U/L   CBC and Auto Differential   Result Value Ref Range    WBC 8.3 4.4 - 11.3 x10*3/uL    nRBC 0.0 0.0 - 0.0 /100 WBCs    RBC 4.57 4.50 - 5.90 x10*6/uL    Hemoglobin 14.5 13.5 - 17.5 g/dL    Hematocrit 41.6 41.0 - 52.0 %    MCV 91 80 - 100 fL    MCH 31.7 26.0 - 34.0 pg    MCHC 34.9 32.0 - 36.0 g/dL    RDW 12.9 11.5 - 14.5 %    Platelets 228 150 - 450 x10*3/uL    Neutrophils % 75.2 40.0 - 80.0 %    Immature Granulocytes %, Automated 0.4 0.0 - 0.9 %    Lymphocytes % 13.6 13.0 - 44.0 %    Monocytes % 8.6 2.0 - 10.0 %    Eosinophils % 1.7 0.0 - 6.0 %    Basophils % 0.5 0.0 - 2.0 %    Neutrophils Absolute 6.10 (H) 1.60 - 5.50 x10*3/uL    Immature Granulocytes Absolute, Automated 0.03 0.00 - 0.50 x10*3/uL    Lymphocytes Absolute 1.10 0.80 - 3.00 x10*3/uL    Monocytes Absolute 0.70 0.05 - 0.80 x10*3/uL    Eosinophils Absolute 0.14 0.00 - 0.40 x10*3/uL    Basophils Absolute 0.04 0.00 - 0.10 x10*3/uL   Sars-CoV-2 and Influenza A/B PCR   Result Value Ref Range    Flu A Result Not Detected Not Detected    Flu B Result Not Detected Not Detected    Coronavirus 2019, PCR Not Detected Not Detected   RSV PCR   Result Value Ref Range    RSV PCR Not Detected Not Detected      I personally reviewed his MRI of the brain from 2023 which shows marked atrophy and ventriculomegaly.  There is significant microvascular changes    Also reviewed his CT of the head from this admission which shows stable ventriculomegaly.       Assessment:   Mr. Bhatia is a 89 y.o.  male with presumed normal pressure hydrocephalus based on LP done with Dr. Quigley.  He is undergoing evaluation for shunt placement with Dr. Berg, however they have not followed up on this.  They intended to repeat LP testing to confirm the diagnosis and plan shunt placement.  However the patient at that time did not want to follow through or was not interested with having a shunt  since symptoms were mild.  Now that they are more severe unclear if he would be a candidate or would benefit from a shunt.  However I would recommend they reconnect with Dr. Berg from neurosurgery, as he is expert on this condition and would be able to better determine the utility of any interventions moving forward.  Regardless there is no inpatient treatment or anything to offer at this time.  He should have strict fall precautions, as well as use of walker and assistive devices as per the recommendations of physical therapy.  In regards to his cognitive impairment, he appears to be at or within reasonable levels of his normal baseline.    Recommendations:  No further neurologic workup  Recommend outpatient follow-up with Dr. Berg neurosurgery  Neurology will sign off      GUILLERMINA Camarillo     Medical decision making was high complexity.  The patient has an acute neurologic change, and I independently interpreted his neuroimaging.    Elmo García MD  Neurology and Neuromuscular Medicine   [facility]  [facility] [place]

## 2025-02-06 NOTE — PROGRESS NOTES
Enmanuel Bhatia is a 89 y.o. male on day 0 of admission presenting with Recurrent falls.      Subjective        Patient s wife at bed side, patient less agitated  Objective     Last Recorded Vitals  /70 (BP Location: Right arm, Patient Position: Lying)   Pulse 75   Temp 36.8 °C (98.2 °F) (Oral)   Resp 18   Wt 82 kg (180 lb 12.4 oz)   SpO2 96%   Intake/Output last 3 Shifts:    Intake/Output Summary (Last 24 hours) at 2/6/2025 1751  Last data filed at 2/6/2025 1200  Gross per 24 hour   Intake --   Output 1125 ml   Net -1125 ml       Admission Weight  Weight: 81.6 kg (180 lb) (02/03/25 1704)    Daily Weight  02/04/25 : 82 kg (180 lb 12.4 oz)    Image Results      Physical Exam  Constitutional:       Appearance: Normal appearance.   HENT:      Head: Normocephalic and atraumatic.   Cardiovascular:      Rate and Rhythm: Normal rate and regular rhythm.   Pulmonary:      Effort: Pulmonary effort is normal.      Breath sounds: Normal breath sounds.   Abdominal:      General: Bowel sounds are normal.      Palpations: Abdomen is soft.   Musculoskeletal:         General: Normal range of motion.   Skin:     General: Skin is warm.   Neurological:      General: No focal deficit present.      Mental Status: He is alert.     /unchanged    Relevant Results               Assessment/Plan                  Assessment & Plan  Recurrent falls    Fall  -fell after PT today - wife could  not get him up -EMS called  -CT cervical spine, CT head, CXR and XR pelvis as above  -fall precautions  -PT/OT consulted  -unclear if lightheaded/dizzy or mechanical fall  -EKG as above  -tele monitoring  Gait instability  -fall at home after PT  -follows with Dr. Quigley outpatient for multifactoral gait disorder with paresthesias  -resume PTA Neurontin  -fall precautions  -PT/OT consulted  -may need placement  Memory impairment  -poor historian  -history of normal pressure hydrocepalus  -cannot recall why he is in hospital   -states he drinks 2  beers nightly - wife states he hasn't drank any alcohol for  many years  Benign prostatic hyperplasia with lower urinary tract symptoms  See below  Mixed stress and urge urinary incontinence  -follows with Dr. Martinez outpt  -U/A as above-no active signs of UTI, no leukocytosis  -resume PTA [place]  History of depression  -resume PTA Buspirone  Murmur, cardiac  -noted +murmur on exam  -consider echo/cardiology consultation as needed  -tele monitoring  Urine, reevaluate agitation              Moon De La Paz MD

## 2025-02-06 NOTE — PROGRESS NOTES
Occupational Therapy    OT Treatment    Patient Name: Enmanuel Bhatia  MRN: [id number]  Department: [address]  Room: [address] [place] [address]  Today's Date: 2/6/2025  Time Calculation  Start Time: 1511  Stop Time: 1537  Time Calculation (min): 26 min        Assessment:  OT Assessment: Pt making slow progress towards established OT goals. Pt would benefit from continued acute OT services to address deficits in ADLs, functional mobility, and transfers  Prognosis: Fair  Barriers to Discharge Home: Caregiver assistance, Cognition needs, Physical needs  Caregiver Assistance: Caregiver assistance needed per identified barriers - however, level of patient's required assistance exceeds assistance available at home  Cognition Needs: Cognition-related high falls risk  Physical Needs: 24hr mobility assistance needed, High falls risk due to function or environment, 24hr ADL assistance needed  Medical Staff Made Aware: Yes  End of Session Communication: Bedside nurse  End of Session Patient Position: Bed, 4 rail up, Alarm on (pts spouse requesting 4 bed rails be up, all needs in reach)  OT Assessment Results: Decreased ADL status, Decreased upper extremity range of motion, Decreased upper extremity strength, Decreased safe judgment during ADL, Decreased cognition, Decreased endurance, Decreased functional mobility  Prognosis: Fair  Medical Staff Made Aware: Yes  Plan:  Treatment Interventions: ADL retraining, Functional transfer training, Patient/family training, Neuromuscular reeducation, Compensatory technique education  OT Frequency: 4 times per week  OT Discharge Recommendations: Moderate intensity level of continued care  OT Recommended Transfer Status: Moderate assist, Assist of 1  OT - OK to Discharge: Yes  Treatment Interventions: ADL retraining, Functional transfer training, Patient/family training, Neuromuscular reeducation, Compensatory technique education    Subjective   Previous Visit Info:  OT Last Visit  OT Received On:  02/06/25  General:  General  Reason for Referral: Impaired ADLs. 89 year old WM admitted from home with c/o multiple falls at home  Referred By: Moon De La Paz MD  Past Medical History Relevant to Rehab: normal pressure hydrocephalus, BPH, Gait instability,  SBO, Memory impaired, GERD, parasthesis, arthritis  Family/Caregiver Present: Yes  Caregiver Feedback: spouse  Prior to Session Communication: Bedside nurse  Patient Position Received: Bed, 3 rail up, Alarm on  General Comment: Cleared by nursing. Pt pleasant and agreeable to therapy  Precautions:  Hearing/Visual Limitations: hearing WFL, wears glasses  Medical Precautions: Fall precautions      Vital Signs Comment: HR 75, SpO2 94% on RA     Pain:  Pain Assessment  Pain Assessment: 0-10  0-10 (Numeric) Pain Score: 0 - No pain    Objective    Cognition:  Cognition  Overall Cognitive Status: Impaired  Orientation Level: Disoriented to place, Disoriented to time, Disoriented to situation  Insight: Severe  Impulsive: Moderately     Activities of Daily Living:      UE Dressing  UE Dressing Level of Assistance: Moderate assistance  UE Dressing Where Assessed: Edge of bed  UE Dressing Comments: mod A to don hospital gown and to maintain seated balance during task     Bed Mobility/Transfers: Bed Mobility  Bed Mobility:  (Max A for trunk upright, pt able to advance BLEs off bed for seated EOB>supine with HOB elevated and use of bed rail; max A for controlled descent of trunk and assist with BLEs seated EOB>supine with HOB flat; increased time and effort to complete tasks)    Transfers  Transfer:  (max A for balance and controlled descent sit<>stand x2 trials bed level, VCs for safe hand and leg placement)     Functional Mobility:  Functional Mobility  Functional Mobility Performed:  (max A for balance/safety for ~2-3 shuffled side steps with use of RW, pt very unsteady, resting BLEs against bed, unable to stand fully upright)  Sitting Balance:  Static Sitting  Balance  Static Sitting-Balance Support: Feet supported, Bilateral upper extremity supported  Static Sitting-Level of Assistance: Minimum assistance, Moderate assistance (fluctuating assist level, posterior lean, frequent VCs for trunk upright,)  Outcome Measures:[facility] Daily Activity  Putting on and taking off regular lower body clothing: A lot  Bathing (including washing, rinsing, drying): A lot  Putting on and taking off regular upper body clothing: A lot  Toileting, which includes using toilet, bedpan or urinal: A lot  Taking care of personal grooming such as brushing teeth: A little  Eating Meals: A little  Daily Activity - Total Score: 14    Education Documentation  ADL Training, taught by Saida Conklin OT at 2/6/2025  3:50 PM.  Learner: Patient  Readiness: Acceptance  Method: Explanation, Demonstration  Response: Needs Reinforcement  Comment: Educated on OT POC    Education Comments  No comments found.      Goals:  Encounter Problems       Encounter Problems (Active)       Bathing       LTG - Patient will utilize adaptive techniques to bathe body with close supervision and set up (Not Progressing)       Start:  02/04/25    Expected End:  03/04/25               Dressings Lower Extremities       LTG - Patient will dress lower body with close supervision and set up (Not Progressing)       Start:  02/04/25    Expected End:  03/04/25               Functional Mobility       Perfrom functional mobility to and from the bathroom with 2ww and close supervision (Progressing)       Start:  02/04/25    Expected End:  03/04/25               OT Transfers       LTG - Patient will perform all functional transfers with close supervision and 2ww (Progressing)       Start:  02/04/25    Expected End:  03/04/25               Toileting       LTG - Patient will complete daily toileting tasks with close supervision and 2ww (Not Progressing)       Start:  02/04/25    Expected End:  03/04/25

## 2025-02-06 NOTE — CONSULTS
Inpatient consult to Infectious Diseases  Consult performed by: Juan Oquendo MD  Consult ordered by: Moon De La Paz MD            Primary MD: Grant Pacheco MD    Reason For Consult  Altered mental status, rule out urinary tract infection    History Of Present Illness  Enmanuel Bhatia is a 89 y.o. male presenting with fall.  He has history of normal pressure hydrocephalus, BPH.  He also has history of memory impairment.  He ambulates with a walker, and was found on the floor on day of admission.  His workup was marked for pyuria, but has negative urine culture.  He is unable provide any comprehensive history because he was nonverbal at home evaluation.  He is not on any antibiotic     Past Medical History  He has a past medical history of Benign prostatic hyperplasia without lower urinary tract symptoms, Gait instability, GERD (gastroesophageal reflux disease), Hydrocephalus, Memory impairment, Paresthesias, Personal history of other diseases of the musculoskeletal system and connective tissue, Small bowel obstruction (Multi), and Urinary incontinence.    Surgical History  He has a past surgical history that includes Other surgical history (07/22/2019) and Colon surgery.     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  Allergies  Patient has no known allergies.     Immunization History   Administered Date(s) Administered    Moderna SARS-CoV-2 Vaccination 03/10/2021     Medications  Home medications:  Medications Prior to Admission   Medication Sig Dispense Refill Last Dose/Taking    buspirone  HCl (BUSPIRONE ORAL) Take 1 tablet by mouth 2 times a day.   2/3/2025 Morning    cholecalciferol (Vitamin D-3) 125 mcg (5000 UT) capsule Take 1 capsule (125 mcg) by mouth once daily.   2/3/2025 Morning    cyanocobalamin, vitamin B-12, (VITAMIN B-12 ORAL) Take 1 tablet by mouth once daily.   2/3/2025 Morning    fluticasone (Flonase) 50 mcg/actuation nasal spray Administer 1 spray into each nostril once daily. Shake gently. Before first use, prime pump. After use, clean tip and replace cap.   2/3/2025 Morning    gabapentin (Neurontin) 300 mg capsule Take 1 capsule (300 mg) by mouth once daily at bedtime.   2/2/2025 Bedtime    mirtazapine (Remeron) 15 mg tablet Take 1 tablet (15 mg) by mouth once daily at bedtime.   2/2/2025 Bedtime    nystatin (Mycostatin) cream Apply 1 Application topically 2 times a day.   2/3/2025 Morning    pantoprazole (ProtoNix) 40 mg EC tablet Take 1 tablet (40 mg) by mouth early in the morning..   2/3/2025 Morning    trospium (Sanctura) 20 mg tablet Take 1 tablet (20 mg) by mouth once daily at bedtime.   2/2/2025 Bedtime     Current medications:  Scheduled medications  busPIRone, 7.5 mg, oral, BID  enoxaparin, 40 mg, subcutaneous, Daily  gabapentin, 300 mg, oral, Nightly  mirtazapine, 15 mg, oral, Nightly  oxybutynin, 5 mg, oral, BID  pantoprazole, 40 mg, oral, Daily      Continuous medications     PRN medications  PRN medications: acetaminophen **OR** acetaminophen **OR** acetaminophen, bisacodyl, OLANZapine **OR** OLANZapine, ondansetron ODT **OR** ondansetron, traMADol    Review of Systems   Unable to perform ROS: Mental status change        Objective  Range of Vitals (last 24 hours)  Heart Rate:  [86-87]   Temp:  [36.8 °C (98.2 °F)-37.3 °C (99.1 °F)]   Resp:  [18]   BP: (129-164)/(70-91)   SpO2:  [93 %-100 %]   Daily Weight  02/04/25 : 82 kg (180 lb 12.4 oz)    Body mass index is 25.94 kg/m².     Physical Exam  HENT:      Head: Normocephalic and atraumatic.      Nose: Nose normal.   Eyes:  "     General: No scleral icterus.     Extraocular Movements: Extraocular movements intact.      Conjunctiva/sclera: Conjunctivae normal.   Cardiovascular:      Rate and Rhythm: Normal rate and regular rhythm.      Heart sounds: Normal heart sounds.   Pulmonary:      Effort: Pulmonary effort is normal.      Breath sounds: Normal breath sounds.   Abdominal:      General: Bowel sounds are normal.      Palpations: Abdomen is soft.   Musculoskeletal:      Cervical back: Normal range of motion and neck supple.      Right lower leg: No edema.      Left lower leg: No edema.   Skin:     General: Skin is warm and dry.   Neurological:      Mental Status: He is lethargic.          Relevant Results  Outside Hospital Results    Labs  Results from last 72 hours   Lab Units 02/06/25  0915 02/03/25  1731   WBC AUTO x10*3/uL 8.3 6.2   HEMOGLOBIN g/dL 14.5 13.0*   HEMATOCRIT % 41.6 37.6*   PLATELETS AUTO x10*3/uL 228 225   NEUTROS PCT AUTO %  --  67.2   LYMPHS PCT AUTO %  --  21.0   MONOS PCT AUTO %  --  7.4   EOS PCT AUTO %  --  3.0     Results from last 72 hours   Lab Units 02/03/25  1710   SODIUM mmol/L 135*   POTASSIUM mmol/L 4.1   CHLORIDE mmol/L 101   CO2 mmol/L 29   BUN mg/dL 12   CREATININE mg/dL 1.00   GLUCOSE mg/dL 98   CALCIUM mg/dL 9.1   ANION GAP mmol/L 9*   EGFR mL/min/1.73m*2 72     Results from last 72 hours   Lab Units 02/03/25  1710   ALK PHOS U/L 73   BILIRUBIN TOTAL mg/dL 0.5   PROTEIN TOTAL g/dL 6.0*   ALT U/L 11   AST U/L 14   ALBUMIN g/dL 3.8     Estimated Creatinine Clearance: 51.7 mL/min (by C-G formula based on SCr of 1 mg/dL).  C-Reactive Protein   Date Value Ref Range Status   02/08/2024 <0.30 0.00 - 2.00 mg/dL Final     Sedimentation Rate   Date Value Ref Range Status   02/08/2024 <1 0 - 20 mm/h Final     No results found for: \"HIV1X2\", \"HIVCONF\", \"WMIWSP3JP\"  No results found for: \"HEPCABINIT\", \"HEPCAB\", \"HCVPCRQUANT\"  Microbiology  Reviewed-repeat urine culture pending  Imaging  ECG 12 lead    Result " Date: 2/4/2025   Poor data quality, interpretation may be adversely affected Normal sinus rhythm Left axis deviation Septal infarct , age undetermined Abnormal ECG When compared with ECG of 05-SEP-2019 17:34, Septal infarct is now Present T wave inversion no longer evident in Inferior leads Confirmed by Davis Puri ([id number]) on 2/4/2025 2:37:17 PM    CT cervical spine wo IV contrast    Result Date: 2/3/2025  Interpreted By:  Derick Dallas, STUDY: CT CERVICAL SPINE WO IV CONTRAST;  2/3/2025 6:07 pm   INDICATION: Signs/Symptoms:fall, possible head injury.     COMPARISON: CT cervical spine 01/24/2023   ACCESSION NUMBER(S): [id number]   ORDERING CLINICIAN: JUAN DICKINSON   TECHNIQUE: Axial CT images of the cervical spine are obtained. Axial, coronal and sagittal reconstructions are provided for review.   FINDINGS:     Fractures: There is no evidence for an acute fracture of the cervical spine.   Vertebral Alignment: No obvious posttraumatic malalignment.   Craniocervical Junction: The odontoid process and craniocervical junction are intact.   Vertebrae/Disc Spaces:  Severe multilevel spondylosis. Multilevel disc space narrowing. Multilevel facet arthropathy Multilevel uncovertebral hypertrophy.Multilevel osteophyte formation.Chronic appearing ossicle adjacent to the left lateral mass of C1.   Prevertebral/Paraspinal Soft Tissues: The prevertebral and paraspinal soft tissues are unremarkable.         Multilevel spondylosis without acute osseous abnormality of the cervical spine.   MACRO: None   Signed by: Derick Dallas 2/3/2025 6:35 PM Dictation workstation:   [id number]    CT head wo IV contrast    Result Date: 2/3/2025  Interpreted By:  Derick Dallas, STUDY: CT HEAD WO IV CONTRAST;  2/3/2025 6:07 pm   INDICATION: Signs/Symptoms:fall, possible head injury.   COMPARISON: Head CT 02/03/2024   ACCESSION NUMBER(S): [id number]   ORDERING CLINICIAN: JUAN DICKINSON   TECHNIQUE: Noncontrast axial CT scan of head was  performed. Angled reformats in brain and bone windows were generated. The images were reviewed in bone, brain, blood and soft tissue windows.   FINDINGS: CSF Spaces: Ventricles appear stable respect to size and configuration. There is no extraaxial fluid collection.   Parenchyma: Periventricular and subcortical white matter hypoattenuation is nonspecific, however likely reflects chronic microvascular ischemic versus chronic hypertensive changes. The grey-white differentiation is intact. There is no mass effect or midline shift.  There is no intracranial hemorrhage.   Calvarium: No acute displaced calvarial fracture.   Paranasal sinuses and mastoids: Bilateral ethmoid sinus and right maxillary mucosal thickening.       No CT evidence of acute intracranial injury.       MACRO: None     Signed by: Derick Dallas 2/3/2025 6:33 PM Dictation workstation:   [id number]    XR chest 1 view    Result Date: 2/3/2025  Interpreted By:  Kailash Haley, STUDY: XR CHEST 1 VIEW;  2/3/2025 5:52 pm   INDICATION: Signs/Symptoms:fall.     COMPARISON: None.   ACCESSION NUMBER(S): [id number]   ORDERING CLINICIAN: JUAN DICKINSON   FINDINGS:         CARDIOMEDIASTINAL SILHOUETTE: Cardiomediastinal silhouette is normal in size and configuration.   LUNGS: Lungs are clear. There is no consolidation or effusion.   ABDOMEN: No remarkable upper abdominal findings.   BONES: No acute osseous changes.       1.  No evidence of acute cardiopulmonary process.       MACRO: None   Signed by: Kailash Haley 2/3/2025 5:59 PM Dictation workstation:   [id number]    XR pelvis 1-2 views    Result Date: 2/3/2025  Interpreted By:  Kailash Haley, STUDY: XR PELVIS 1-2 VIEWS; ;  2/3/2025 5:52 pm   INDICATION: Signs/Symptoms:fall.     COMPARISON: None.   ACCESSION NUMBER(S): [id number]   ORDERING CLINICIAN: JUAN PATALEX   FINDINGS: Pelvis, single view   There is no fracture. There is no dislocation. There are no degenerative changes. There is no lytic  or sclerotic lesion. There is no soft tissue abnormality seen. Partially visualized spondylosis in the lower lumbar spine.       No acute abnormality in the pelvis.   MACRO: None   Signed by: Kailash Haley 2/3/2025 5:59 PM Dictation workstation:   [id number]    Assessment/Plan   Encephalopathy, etiology unclear.  Diarrhea, negative initial urine culture, repeat cultures pending      No antibiotics  Follow-up respiratory culture  Coronavirus/influenza/RSV PCR  Follow-up pending workup  Monitor temperature and WBC  Further recommendations based on culture result        Juan Oquendo MD

## 2025-02-06 NOTE — PROGRESS NOTES
Enmanuel Bhatia is a 89 y.o. male on day 0 of admission presenting with Recurrent falls.      Subjective     Charts were reviewed, and the patient case was discussed with the assigned RN. The RN reported that she was able to reach out and speak with the patient's son, and the relevant information is on file. She has requested that the son brings the Power of  (POA) paperwork.  The RN shared that the patient's behaviors have been well managed throughout the day. She recounted an episode of agitation from the patient yesterday at around 5 PM, which she successfully managed by redirecting the patient and spending time with him. The findings regarding the patient's condition are consistent with those of patients with dementia. When the patient appears restless, the staff distracts him by providing items to keep him occupied. They also had him in a wheelchair for a bit today, allowing him to move around the unit.  During our face-to-face encounter with the patient, he was taking a nap. We gently woke him, and he smiled in response. When asked how he was feeling, he indicated that he was okay but had been sleeping. Upon inquiring if he felt safe, he confirmed that he did. However, it was noted that the patient is confused about his location, expressing that he feels like he is at home currently. After asking him a few more questions, he went back to sleep, so we allowed him to rest.  The primary provider reached out to notify us about the patient's behaviors from the previous day. We appreciate being involved in this patient’s case and will continue to modify the medications he is currently on to help manage his behaviors stemming from his cognitive state.     Modifications will be noted at the end of this note.     We will follow up with the patient again tomorrow.       Objective     Last Recorded Vitals  Blood pressure 129/70, pulse 86, temperature 36.8 °C (98.2 °F), temperature source Oral, resp. rate 18,  "height 1.778 m (5' 10\"), weight 82 kg (180 lb 12.4 oz), SpO2 93%.    Review of Systems   Unable to perform ROS: Dementia       Psychiatric ROS - Adult  Anxiety: General Anxiety Disorder (MILTON)MILTON Behaviors: difficulty concentrating and restlessness  Depression: sleep decreased  and withdrawn  Delirium: acute inattention, disorientation, sleep-wake abnormal, and waxing and waning  Psychosis: negative  Anna: negative  Safety Issues: None  Psychiatric ROS Comment: As noted    Physical Exam      Mental Status Exam  General Appearance: Fairly groomed.  Gait/Station: Within normal limits  Speech: Slow speech  Mood: \"ok, I am sleepy\"  Affect: Congruent with mood and topic of conversation  Thought Process: Tangential  Thought Associations: Severe loosening of associations and Occasional derailment  Thought Content: tangential  Perception: No perceptual abnormalities noted  Level of Consciousness:  Altered and confused  Orientation: Not oriented to place, time/date, situation, day of week, month of year, and year  Attention and Concentration: Unable to assess due to  patient’s confusion, we are unable to effectively assess their attention and concentration at this time.     Recent Memory: Impaired as evidenced by difficulty recalling details from the past 24 hours  and Impaired as evidenced by confabulation   Remote Memory: Impaired as evidenced by difficulty recalling previous medical issues , Impaired as evidenced by difficulty recalling events from childhood , and Impaired as evidenced by confabulation   Executive function: Impaired as evidenced by Inattention and poor impulse control due to confusion   Language: Word finding difficulties  Fund of knowledge: Severely limited  Insight: Severely impaired, as evidenced by inability to understand and lack of awareness    Judgment: Limited, as evidence by inability to reason through medical decision making and diminished ability to reason    Psychiatric Risk " Assessment  Violence Risk Assessment: male and other current mental state of confusion  Acute Risk of Harm to Others is Considered: low   Suicide Risk Assessment: age > 65 yrs old, , and male  Protective Factors against Suicide: positive family relationships  Acute Risk of Harm to Self is Considered: low    Relevant Results               Assessment/Plan   Assessment & Plan  Recurrent falls    Fall    Gait instability    Memory impairment    Benign prostatic hyperplasia with lower urinary tract symptoms    Mixed stress and urge urinary incontinence    History of depression    Murmur, cardiac    Depression             -  Continue Remeron 15 mg PO at bedtime.   2.  Anxiety              - Continue Buspar  7.5 mg PO, BID, recommended dose to manage anxiety, denies any side effects from dosage increase              - Continue Gabapentin 300 mg PO can also help with anxiety  3.   Agitation   - Can give Zyprexa 2.5 mg PO PRN  or  2.5 mg IM if refusing PO to help with behavioral disturbances and poor impulse control.  - Start Zyprexa zydis 5 mg PO at bedtime scheduled.     The patient is currently on multiple medications to help manage behaviors stemming from his cognitive state. We will continue to monitor the effectiveness of this recent medication regimen. If there is no significant improvement, we will reassess the situation tomorrow and consider starting the patient on a low dose of Depakote at 125 mg BID, which may provide additional support in managing his symptoms.    We appreciate the nursing staff for their excellent work in maintaining patient safety and effectively redirecting him, ensuring that he does not feel ostracized or trapped. The patient may not be able at understanding current environment, which exacerbates his delirium, contributing to his appearance of agitation when, in fact, he may simply be confused.       - Would also recommend STRICT delirium precautions:       -- Minimize use of benzos,  opiates, anticholinergics, as these may worsen mental status   -- Would use caution with narcotic pain medications   -- Would still adequately controlling pain, as uncontrolled pain is also a risk factor for delirium   -- Reinforce sleep hygiene; encourage patient to stay awake during the day   -- Keep curtains/blinds open during the day and closed at night.   -- Would recommend reorienting/redirecting patient as much as possible,    -- Aim for consistent staffing, familiar objects, avoiding bright lights and loud noises, etc    The patient currently does not meet the criteria for the inpatient psychiatric treatment.      Thank you for allowing us to participate in the care of this patient. We will continue to follow again tomorrow.        I personally spent 47 minutes today providing care for this patient, including preparation, face to face time, documentation and other services such as review of medical records, diagnostic result, collaboration with primary team and providers, patient education, counseling, coordination of care as specified in the encounter.       Parts of this chart have been completed using voice recognition software.  Please excuse any errors of transcription.  Despite the medical decision making time stamp, my medical decision making has taken place during the patient's entire visit.  Thought process and reason for plan has been formulated from the time that I saw the patient until the time of disposition and is not specific to one specific moment during their visit and furthermore the medical decision making encompasses the entire chart and not only that represented in this note.      Priscilla Reece, JEREMYP-BC  Psychiatry, [facility]  1* contact: Hubei Kento Electronic preferred

## 2025-02-06 NOTE — CARE PLAN
The patient's goals for the shift include Rest well & safety    The clinical goals for the shift include pt will remain safe and free from injury      Problem: Safety - Adult  Goal: Free from fall injury  Outcome: Progressing     Problem: Discharge Planning  Goal: Discharge to home or other facility with appropriate resources  Outcome: Progressing     Problem: Chronic Conditions and Co-morbidities  Goal: Patient's chronic conditions and co-morbidity symptoms are monitored and maintained or improved  Outcome: Progressing     Problem: Nutrition  Goal: Nutrient intake appropriate for maintaining nutritional needs  Outcome: Progressing     Problem: Fall/Injury  Goal: Not fall by end of shift  Outcome: Progressing  Goal: Be free from injury by end of the shift  Outcome: Progressing  Goal: Verbalize understanding of personal risk factors for fall in the hospital  Outcome: Progressing  Goal: Verbalize understanding of risk factor reduction measures to prevent injury from fall in the home  Outcome: Progressing  Goal: Use assistive devices by end of the shift  Outcome: Progressing  Goal: Pace activities to prevent fatigue by end of the shift  Outcome: Progressing     Problem: Bathing  Goal: LTG - Patient will utilize adaptive techniques to bathe body with close supervision and set up  Outcome: Progressing     Problem: Dressings Lower Extremities  Goal: LTG - Patient will dress lower body with close supervision and set up  Outcome: Progressing     Problem: Toileting  Goal: LTG - Patient will complete daily toileting tasks with close supervision and 2ww  Outcome: Progressing     Problem: Skin  Goal: Decreased wound size/increased tissue granulation at next dressing change  Outcome: Progressing  Flowsheets (Taken 2/6/2025 0119)  Decreased wound size/increased tissue granulation at next dressing change:   Promote sleep for wound healing   Protective dressings over bony prominences  Goal: Participates in plan/prevention/treatment  measures  Outcome: Progressing  Flowsheets (Taken 2/6/2025 0119)  Participates in plan/prevention/treatment measures:   Elevate heels   Discuss with provider PT/OT consult   Increase activity/out of bed for meals  Goal: Prevent/manage excess moisture  Outcome: Progressing  Flowsheets (Taken 2/6/2025 0119)  Prevent/manage excess moisture:   Cleanse incontinence/protect with barrier cream   Moisturize dry skin  Goal: Prevent/minimize sheer/friction injuries  Outcome: Progressing  Flowsheets (Taken 2/6/2025 0119)  Prevent/minimize sheer/friction injuries: Use pull sheet  Goal: Promote/optimize nutrition  Outcome: Progressing  Flowsheets (Taken 2/6/2025 0119)  Promote/optimize nutrition:   Monitor/record intake including meals   Consume > 50% meals/supplements   Offer water/supplements/favorite foods  Goal: Promote skin healing  Outcome: Progressing  Flowsheets (Taken 2/6/2025 0119)  Promote skin healing: Assess skin/pad under line(s)/device(s)

## 2025-02-06 NOTE — CARE PLAN
The patient's goals for the shift include Rest well & safety    The clinical goals for the shift include maintain safe

## 2025-02-06 NOTE — PROGRESS NOTES
02/06/25 1311   Discharge Planning   Expected Discharge Disposition SNF  ([facility])     Margarita Precert Request Status: Approved  Auth. # [id number]  Dates: 2/06/2025 - 2/12/2025     Patient has consults for ID and neuro at this time   Awaiting response from Dr leavitt regarding updated ADOD     ** do not discharge without speaking to care coordination**

## 2025-02-07 ENCOUNTER — HOME CARE VISIT (OUTPATIENT)
Dept: HOME HEALTH SERVICES | Facility: HOME HEALTH | Age: OVER 89
End: 2025-02-07
Payer: MEDICARE

## 2025-02-07 PROCEDURE — 96372 THER/PROPH/DIAG INJ SC/IM: CPT | Performed by: NURSE PRACTITIONER

## 2025-02-07 PROCEDURE — 2500000002 HC RX 250 W HCPCS SELF ADMINISTERED DRUGS (ALT 637 FOR MEDICARE OP, ALT 636 FOR OP/ED): Performed by: NURSE PRACTITIONER

## 2025-02-07 PROCEDURE — 2500000004 HC RX 250 GENERAL PHARMACY W/ HCPCS (ALT 636 FOR OP/ED): Performed by: INTERNAL MEDICINE

## 2025-02-07 PROCEDURE — 99231 SBSQ HOSP IP/OBS SF/LOW 25: CPT

## 2025-02-07 PROCEDURE — 2500000004 HC RX 250 GENERAL PHARMACY W/ HCPCS (ALT 636 FOR OP/ED): Performed by: NURSE PRACTITIONER

## 2025-02-07 PROCEDURE — 2500000001 HC RX 250 WO HCPCS SELF ADMINISTERED DRUGS (ALT 637 FOR MEDICARE OP)

## 2025-02-07 PROCEDURE — 1210000001 HC SEMI-PRIVATE ROOM DAILY

## 2025-02-07 PROCEDURE — 2500000002 HC RX 250 W HCPCS SELF ADMINISTERED DRUGS (ALT 637 FOR MEDICARE OP, ALT 636 FOR OP/ED)

## 2025-02-07 PROCEDURE — 2500000001 HC RX 250 WO HCPCS SELF ADMINISTERED DRUGS (ALT 637 FOR MEDICARE OP): Performed by: NURSE PRACTITIONER

## 2025-02-07 RX ORDER — SODIUM CHLORIDE 9 MG/ML
75 INJECTION, SOLUTION INTRAVENOUS CONTINUOUS
Status: ACTIVE | OUTPATIENT
Start: 2025-02-07 | End: 2025-02-08

## 2025-02-07 RX ADMIN — OXYBUTYNIN CHLORIDE 5 MG: 5 TABLET ORAL at 20:37

## 2025-02-07 RX ADMIN — OLANZAPINE 5 MG: 5 TABLET, ORALLY DISINTEGRATING ORAL at 20:37

## 2025-02-07 RX ADMIN — BUSPIRONE HYDROCHLORIDE 7.5 MG: 5 TABLET ORAL at 20:37

## 2025-02-07 RX ADMIN — SODIUM CHLORIDE 75 ML/HR: 9 INJECTION, SOLUTION INTRAVENOUS at 00:37

## 2025-02-07 RX ADMIN — GABAPENTIN 300 MG: 300 CAPSULE ORAL at 20:37

## 2025-02-07 RX ADMIN — ENOXAPARIN SODIUM 40 MG: 40 INJECTION SUBCUTANEOUS at 08:00

## 2025-02-07 RX ADMIN — BISACODYL 10 MG: 5 TABLET, COATED ORAL at 17:14

## 2025-02-07 RX ADMIN — OXYBUTYNIN CHLORIDE 5 MG: 5 TABLET ORAL at 08:00

## 2025-02-07 RX ADMIN — MIRTAZAPINE 15 MG: 15 TABLET, FILM COATED ORAL at 20:37

## 2025-02-07 RX ADMIN — PANTOPRAZOLE SODIUM 40 MG: 40 TABLET, DELAYED RELEASE ORAL at 06:06

## 2025-02-07 RX ADMIN — BUSPIRONE HYDROCHLORIDE 7.5 MG: 5 TABLET ORAL at 08:00

## 2025-02-07 ASSESSMENT — PAIN SCALES - GENERAL
PAINLEVEL_OUTOF10: 0 - NO PAIN
PAINLEVEL_OUTOF10: 0 - NO PAIN

## 2025-02-07 ASSESSMENT — COGNITIVE AND FUNCTIONAL STATUS - GENERAL
DAILY ACTIVITIY SCORE: 16
DRESSING REGULAR LOWER BODY CLOTHING: A LOT
MOVING TO AND FROM BED TO CHAIR: A LITTLE
CLIMB 3 TO 5 STEPS WITH RAILING: TOTAL
CLIMB 3 TO 5 STEPS WITH RAILING: TOTAL
PERSONAL GROOMING: A LITTLE
DRESSING REGULAR UPPER BODY CLOTHING: A LITTLE
TOILETING: A LOT
TOILETING: A LOT
STANDING UP FROM CHAIR USING ARMS: A LOT
PERSONAL GROOMING: A LITTLE
WALKING IN HOSPITAL ROOM: A LOT
HELP NEEDED FOR BATHING: A LOT
MOBILITY SCORE: 17
DAILY ACTIVITIY SCORE: 16
DRESSING REGULAR UPPER BODY CLOTHING: A LITTLE
DRESSING REGULAR LOWER BODY CLOTHING: A LOT
MOVING TO AND FROM BED TO CHAIR: A LITTLE
STANDING UP FROM CHAIR USING ARMS: A LITTLE
MOBILITY SCORE: 16
WALKING IN HOSPITAL ROOM: A LOT
HELP NEEDED FOR BATHING: A LOT

## 2025-02-07 ASSESSMENT — PAIN - FUNCTIONAL ASSESSMENT: PAIN_FUNCTIONAL_ASSESSMENT: 0-10

## 2025-02-07 NOTE — NURSING NOTE
patient has not really urinated tonight and then when he just had to, he only had about 25 out and then needed urinal again and couldn't go so I bladder scanned him and it was 425. I messaged the hospitalist and asked what I should do since I could not get ahold of dr leavitt she told me to do a [place] but I mentioned concern of his dementia and pulling it out and asked to try straight cath first. she agreed and said to let dr leavitt know . 300 ended up draining from him. I messaged dr leavitt to make her aware since phone is going straight to voicemail with a mailbox full.

## 2025-02-07 NOTE — PROGRESS NOTES
Enmanuel Bhatia is a 89 y.o. male on day 0 of admission presenting with Recurrent falls.    Subjective   Interval History:   Awake, responsive  Afebrile, no chills  No cough, chest pain or shortness of breath  No nausea vomiting or diarrhea        Review of Systems   All other systems reviewed and are negative.      Objective   Range of Vitals (last 24 hours)  Heart Rate:  [71-80]   Temp:  [36.3 °C (97.3 °F)-36.8 °C (98.2 °F)]   Resp:  [17-18]   BP: ()/(42-69)   SpO2:  [93 %-100 %]   Daily Weight  02/04/25 : 82 kg (180 lb 12.4 oz)    Body mass index is 25.94 kg/m².    Physical Exam  Constitutional:       Appearance: Normal appearance.   HENT:      Head: Atraumatic.      Nose: Nose normal.   Eyes:      General: No scleral icterus.     Extraocular Movements: Extraocular movements intact.      Conjunctiva/sclera: Conjunctivae normal.   Cardiovascular:      Rate and Rhythm: Normal rate and regular rhythm.      Heart sounds: Normal heart sounds.   Pulmonary:      Effort: Pulmonary effort is normal.      Breath sounds: Normal breath sounds.   Abdominal:      General: Bowel sounds are normal.      Palpations: Abdomen is soft.      Tenderness: There is no abdominal tenderness.   Musculoskeletal:      Cervical back: Normal range of motion and neck supple.      Right lower leg: No edema.      Left lower leg: No edema.   Skin:     General: Skin is warm and dry.   Neurological:      Mental Status: He is alert.   Psychiatric:         Behavior: Behavior normal.           Antibiotics  This patient does not have an active medication from one of the medication groupers.    Relevant Results  Labs  Results from last 72 hours   Lab Units 02/06/25  0915   WBC AUTO x10*3/uL 8.3  8.3   HEMOGLOBIN g/dL 14.5  14.5   HEMATOCRIT % 41.6  41.6   PLATELETS AUTO x10*3/uL 228  228   NEUTROS PCT AUTO % 75.2   LYMPHS PCT AUTO % 13.6   MONOS PCT AUTO % 8.6   EOS PCT AUTO % 1.7     Results from last 72 hours   Lab Units 02/06/25  0915    SODIUM mmol/L 138   POTASSIUM mmol/L 4.4   CHLORIDE mmol/L 104   CO2 mmol/L 29   BUN mg/dL 16   CREATININE mg/dL 1.03   GLUCOSE mg/dL 97   CALCIUM mg/dL 9.0   ANION GAP mmol/L 9*   EGFR mL/min/1.73m*2 69     Results from last 72 hours   Lab Units 02/06/25  0915   ALK PHOS U/L 73   BILIRUBIN TOTAL mg/dL 0.8   PROTEIN TOTAL g/dL 5.8*   ALT U/L 10   AST U/L 15   ALBUMIN g/dL 3.5     Estimated Creatinine Clearance: 50.2 mL/min (by C-G formula based on SCr of 1.03 mg/dL).  C-Reactive Protein   Date Value Ref Range Status   02/08/2024 <0.30 0.00 - 2.00 mg/dL Final     Microbiology  Reviewed-urine culture pending  Imaging  ECG 12 lead    Result Date: 2/4/2025   Poor data quality, interpretation may be adversely affected Normal sinus rhythm Left axis deviation Septal infarct , age undetermined Abnormal ECG When compared with ECG of 05-SEP-2019 17:34, Septal infarct is now Present T wave inversion no longer evident in Inferior leads Confirmed by Davis Puri ([id number]) on 2/4/2025 2:37:17 PM    CT cervical spine wo IV contrast    Result Date: 2/3/2025  Interpreted By:  Derick Dallas, STUDY: CT CERVICAL SPINE WO IV CONTRAST;  2/3/2025 6:07 pm   INDICATION: Signs/Symptoms:fall, possible head injury.     COMPARISON: CT cervical spine 01/24/2023   ACCESSION NUMBER(S): [id number]   ORDERING CLINICIAN: JUAN DICKINSON   TECHNIQUE: Axial CT images of the cervical spine are obtained. Axial, coronal and sagittal reconstructions are provided for review.   FINDINGS:     Fractures: There is no evidence for an acute fracture of the cervical spine.   Vertebral Alignment: No obvious posttraumatic malalignment.   Craniocervical Junction: The odontoid process and craniocervical junction are intact.   Vertebrae/Disc Spaces:  Severe multilevel spondylosis. Multilevel disc space narrowing. Multilevel facet arthropathy Multilevel uncovertebral hypertrophy.Multilevel osteophyte formation.Chronic appearing ossicle adjacent to the left lateral  mass of C1.   Prevertebral/Paraspinal Soft Tissues: The prevertebral and paraspinal soft tissues are unremarkable.         Multilevel spondylosis without acute osseous abnormality of the cervical spine.   MACRO: None   Signed by: Derick Dallas 2/3/2025 6:35 PM Dictation workstation:   [id number]    CT head wo IV contrast    Result Date: 2/3/2025  Interpreted By:  Derick Dallas, STUDY: CT HEAD WO IV CONTRAST;  2/3/2025 6:07 pm   INDICATION: Signs/Symptoms:fall, possible head injury.   COMPARISON: Head CT 02/03/2024   ACCESSION NUMBER(S): [id number]   ORDERING CLINICIAN: JUAN DICKINSON   TECHNIQUE: Noncontrast axial CT scan of head was performed. Angled reformats in brain and bone windows were generated. The images were reviewed in bone, brain, blood and soft tissue windows.   FINDINGS: CSF Spaces: Ventricles appear stable respect to size and configuration. There is no extraaxial fluid collection.   Parenchyma: Periventricular and subcortical white matter hypoattenuation is nonspecific, however likely reflects chronic microvascular ischemic versus chronic hypertensive changes. The grey-white differentiation is intact. There is no mass effect or midline shift.  There is no intracranial hemorrhage.   Calvarium: No acute displaced calvarial fracture.   Paranasal sinuses and mastoids: Bilateral ethmoid sinus and right maxillary mucosal thickening.       No CT evidence of acute intracranial injury.       MACRO: None     Signed by: Derick Dallas 2/3/2025 6:33 PM Dictation workstation:   [id number]    XR chest 1 view    Result Date: 2/3/2025  Interpreted By:  Kailash Haley, STUDY: XR CHEST 1 VIEW;  2/3/2025 5:52 pm   INDICATION: Signs/Symptoms:fall.     COMPARISON: None.   ACCESSION NUMBER(S): [id number]   ORDERING CLINICIAN: JUAN DICKINSON   FINDINGS:         CARDIOMEDIASTINAL SILHOUETTE: Cardiomediastinal silhouette is normal in size and configuration.   LUNGS: Lungs are clear. There is no consolidation or  effusion.   ABDOMEN: No remarkable upper abdominal findings.   BONES: No acute osseous changes.       1.  No evidence of acute cardiopulmonary process.       MACRO: None   Signed by: Kailash Haley 2/3/2025 5:59 PM Dictation workstation:   [id number]    XR pelvis 1-2 views    Result Date: 2/3/2025  Interpreted By:  Kailash Haley, STUDY: XR PELVIS 1-2 VIEWS; ;  2/3/2025 5:52 pm   INDICATION: Signs/Symptoms:fall.     COMPARISON: None.   ACCESSION NUMBER(S): [id number]   ORDERING CLINICIAN: JUAN DICKINSON   FINDINGS: Pelvis, single view   There is no fracture. There is no dislocation. There are no degenerative changes. There is no lytic or sclerotic lesion. There is no soft tissue abnormality seen. Partially visualized spondylosis in the lower lumbar spine.       No acute abnormality in the pelvis.   MACRO: None   Signed by: Kailash Haley 2/3/2025 5:59 PM Dictation workstation:   [id number]     Assessment/Plan   Encephalopathy, etiology unclear, resolving  Diarrhea, negative initial urine culture, repeat cultures pending        No antibiotics  Follow-up urine culture  Monitor temperature and WBC  Further recommendations based on culture result  Discharge planning    Juan Oquendo MD

## 2025-02-07 NOTE — CARE PLAN
The patient's goals for the shift include Rest well & safety    The clinical goals for the shift include maintain safe    Over the shift, the patient did not make progress toward the following goals. Barriers to progression include . Recommendations to address these barriers include .

## 2025-02-07 NOTE — PROGRESS NOTES
Enmanuel Bhatia is a 89 y.o. male on day 0 of admission presenting with Recurrent falls.      Subjective   The chart was reviewed, and the patient case was discussed with the assigned RN. RN reported that the patient had a good night's sleep and has remained calm and appropriate for the past three days. We appreciate the consistent care provided by the nursing team, which has helped the patient feel comfortable and safe.    The patient continues to exhibit some confusion regarding the current year, believing it is 1993 instead of 2025. However, he demonstrated improved awareness of his location today, recognizing that he is in the hospital. In previous days, he expressed confusion about his location, initially thinking he was at home and, two days ago, that he was in [place], [place]. The patient appeared calm woken up from his nap, stating that he feels safe and expressing no fear. He continues to show confusion, particularly regarding his timeline, claiming he was  in 1922.    He mentioned that his wife will be visiting soon, although he has not spoken to her on the phone. His appetite remains good, and there are no acute or immediate behavioral concerns today. We reiterated to the nurse the importance of maintaining strong delirium precautions for this patient to prevent sundowning in the evening, advising against naps after  3 PM. We also encouraged participation in physical therapy.    During our encounter, the patient appeared calm and pleasant, denying any auditory or visual hallucinations, which were not present. He persistently denies having suicidal thoughts or understanding the nature of such questions.    We appreciate being involved in this case and will continue to follow up with him again tomorrow to ensure that his behaviors are managed effectively. At this time, we recommend keeping Zyprexa Zydis scheduled at bedtime. Psychiatric support remains available for any questions or concerns, and we  "encourage to reach out via secure chat message.       Objective     Last Recorded Vitals  Blood pressure 125/69, pulse 80, temperature 36.3 °C (97.3 °F), temperature source Oral, resp. rate 17, height 1.778 m (5' 10\"), weight 82 kg (180 lb 12.4 oz), SpO2 100%.    Review of Systems   Unable to perform ROS: Dementia       Psychiatric ROS - Adult  Anxiety: General Anxiety Disorder (MILTON)MILTON Behaviors: difficulty concentrating and restlessness  Depression: sleep decreased  and withdrawn  Delirium: acute inattention, disorientation, sleep-wake abnormal, and waxing and waning  Psychosis: negative  Anna: negative  Safety Issues: None  Psychiatric ROS Comment: As noted    Physical Exam      Mental Status Exam  General Appearance: Fairly groomed.  Gait/Station: Within normal limits  Speech: Slow speech  Mood: \"not so well\"  Affect: Congruent with mood and topic of conversation  Thought Process: Tangential  Thought Associations: Severe loosening of associations and Occasional derailment  Thought Content: tangential  Perception: No perceptual abnormalities noted  Level of Consciousness:  Altered and confused  Orientation: Not oriented to place, time/date, situation, day of week, month of year, and year  Attention and Concentration: Unable to assess due to  patient’s confusion, we are unable to effectively assess their attention and concentration at this time.     Recent Memory: Impaired as evidenced by difficulty recalling details from the past 24 hours  and Impaired as evidenced by confabulation   Remote Memory: Impaired as evidenced by difficulty recalling previous medical issues , Impaired as evidenced by difficulty recalling events from childhood , and Impaired as evidenced by confabulation   Executive function: Impaired as evidenced by Inattention and poor impulse control due to confusion   Language: Word finding difficulties  Fund of knowledge: Severely limited  Insight: Severely impaired, as evidenced by inability to " understand and lack of awareness    Judgment: Limited, as evidence by inability to reason through medical decision making and diminished ability to reason    Psychiatric Risk Assessment  Violence Risk Assessment: male and other current mental state of confusion  Acute Risk of Harm to Others is Considered: low   Suicide Risk Assessment: age > 65 yrs old, , and male  Protective Factors against Suicide: positive family relationships  Acute Risk of Harm to Self is Considered: low    Relevant Results               Assessment/Plan   Assessment & Plan  Recurrent falls    Fall    Gait instability    Memory impairment    Benign prostatic hyperplasia with lower urinary tract symptoms    Mixed stress and urge urinary incontinence    History of depression    Murmur, cardiac    Depression             -  Continue Remeron 15 mg PO at bedtime.   2.  Anxiety              - Continue Buspar  7.5 mg PO, BID, recommended dose to manage anxiety, denies any side effects from dosage increase              - Continue Gabapentin 300 mg PO can also help with anxiety  3.   Agitation   - Can give Zyprexa 2.5 mg PO PRN  or  2.5 mg IM if refusing PO to help with behavioral disturbances and poor impulse control.  - Continue Zyprexa zydis 5 mg PO at bedtime scheduled. No SE reported pt had a good night sleep as a result.    The patient is currently on multiple medications to help manage behaviors stemming from his cognitive state. We will continue to monitor the effectiveness of this recent medication regimen. If there is no significant improvement, we will reassess the situation tomorrow and consider starting the patient on a low dose of Depakote at 125 mg BID, which may provide additional support in managing his symptoms.    We appreciate the nursing staff for their excellent work in maintaining patient safety and effectively redirecting him, ensuring that he does not feel ostracized or trapped. The patient may not be able at  understanding current environment, which exacerbates his delirium, contributing to his appearance of agitation when, in fact, he may simply be confused.       - Would also recommend STRICT delirium precautions:       -- Minimize use of benzos, opiates, anticholinergics, as these may worsen mental status   -- Would use caution with narcotic pain medications   -- Would still adequately controlling pain, as uncontrolled pain is also a risk factor for delirium   -- Reinforce sleep hygiene; encourage patient to stay awake during the day   -- Keep curtains/blinds open during the day and closed at night.   -- Would recommend reorienting/redirecting patient as much as possible,    -- Aim for consistent staffing, familiar objects, avoiding bright lights and loud noises, etc    The patient currently does not meet the criteria for the inpatient psychiatric treatment.      Thank you for allowing us to participate in the care of this patient. We will continue to follow again tomorrow.        I personally spent 33  minutes today providing care for this patient, including preparation, face to face time, documentation and other services such as review of medical records, diagnostic result, collaboration with primary team and providers, patient education, counseling, coordination of care as specified in the encounter.       Parts of this chart have been completed using voice recognition software.  Please excuse any errors of transcription.  Despite the medical decision making time stamp, my medical decision making has taken place during the patient's entire visit.  Thought process and reason for plan has been formulated from the time that I saw the patient until the time of disposition and is not specific to one specific moment during their visit and furthermore the medical decision making encompasses the entire chart and not only that represented in this note.      Priscilla Reece, JEREMYP-BC  Psychiatry, [facility]  1* contact: [employer]  preferred

## 2025-02-07 NOTE — PROGRESS NOTES
25 1500   Discharge Planning   Expected Discharge Disposition SNF  ([facility])     Precert approved     Awaiting med clearance     ** do not discharge without speaking to care coordination**  MD WILL NEED TO SIGN/CERTIFY [facility] AT THE TIME OF DISCHARGE FOR SNF.

## 2025-02-07 NOTE — PROGRESS NOTES
Enmanuel Bhatia is a 89 y.o. male on day 0 of admission presenting with Recurrent falls.      Subjective   Bp better after iv fluids, wife at bedside       Objective     Last Recorded Vitals  /69 (BP Location: Left arm, Patient Position: Lying)   Pulse 80   Temp 36.3 °C (97.3 °F) (Oral)   Resp 17   Wt 82 kg (180 lb 12.4 oz)   SpO2 100%   Intake/Output last 3 Shifts:    Intake/Output Summary (Last 24 hours) at 2/7/2025 1534  Last data filed at 2/7/2025 1526  Gross per 24 hour   Intake 1000 ml   Output 300 ml   Net 700 ml       Admission Weight  Weight: 81.6 kg (180 lb) (02/03/25 1704)    Daily Weight  02/04/25 : 82 kg (180 lb 12.4 oz)    Image Results      Physical Exam  Cardiovascular:      Rate and Rhythm: Normal rate and regular rhythm.   Pulmonary:      Effort: Pulmonary effort is normal.   Abdominal:      General: Bowel sounds are normal.      Palpations: Abdomen is soft.   Musculoskeletal:         General: Normal range of motion.   Neurological:      General: No focal deficit present.      Mental Status: Mental status is at baseline.         Relevant Results               Assessment/Plan          This patient has a urinary catheter   Reason for the urinary catheter remaining today?           Assessment & Plan  Recurrent falls    Fall  -fell after PT today - wife could  not get him up -EMS called  -CT cervical spine, CT head, CXR and XR pelvis as above  -fall precautions  -PT/OT consulted  -unclear if lightheaded/dizzy or mechanical fall  -EKG as above  -tele monitoring  Gait instability  -fall at home after PT  -follows with Dr. Quigley outpatient for multifactoral gait disorder with paresthesias  -resume PTA Neurontin  -fall precautions  -PT/OT consulted  -may need placement  Memory impairment  -poor historian  -history of normal pressure hydrocepalus  -cannot recall why he is in hospital   -states he drinks 2 beers nightly - wife states he hasn't drank any alcohol for  many years  Benign prostatic  hyperplasia with lower urinary tract symptoms  See below  Mixed stress and urge urinary incontinence  -follows with Dr. Martinez outpt  -U/A as above-no active signs of UTI, no leukocytosis  -resume PTA [facility]  History of depression  -resume PTA Buspirone  Murmur, cardiac  -noted +murmur on exam  -consider echo/cardiology consultation as needed  -tele monitoring  Acute urinary outlet obstruction, uralogy to follow please              Moon De La Paz MD

## 2025-02-07 NOTE — CARE PLAN
The patient's goals for the shift include Rest well & safety    The clinical goals for the shift include maintain safety

## 2025-02-08 VITALS
OXYGEN SATURATION: 98 % | HEIGHT: 70 IN | TEMPERATURE: 98.2 F | RESPIRATION RATE: 18 BRPM | DIASTOLIC BLOOD PRESSURE: 66 MMHG | SYSTOLIC BLOOD PRESSURE: 121 MMHG | BODY MASS INDEX: 25.88 KG/M2 | WEIGHT: 180.78 LBS | HEART RATE: 77 BPM

## 2025-02-08 LAB — BACTERIA UR CULT: NORMAL

## 2025-02-08 PROCEDURE — 2500000001 HC RX 250 WO HCPCS SELF ADMINISTERED DRUGS (ALT 637 FOR MEDICARE OP)

## 2025-02-08 PROCEDURE — 2500000002 HC RX 250 W HCPCS SELF ADMINISTERED DRUGS (ALT 637 FOR MEDICARE OP, ALT 636 FOR OP/ED): Performed by: NURSE PRACTITIONER

## 2025-02-08 PROCEDURE — 2500000001 HC RX 250 WO HCPCS SELF ADMINISTERED DRUGS (ALT 637 FOR MEDICARE OP): Performed by: NURSE PRACTITIONER

## 2025-02-08 PROCEDURE — 2500000002 HC RX 250 W HCPCS SELF ADMINISTERED DRUGS (ALT 637 FOR MEDICARE OP, ALT 636 FOR OP/ED)

## 2025-02-08 PROCEDURE — 2500000004 HC RX 250 GENERAL PHARMACY W/ HCPCS (ALT 636 FOR OP/ED): Performed by: NURSE PRACTITIONER

## 2025-02-08 PROCEDURE — 99231 SBSQ HOSP IP/OBS SF/LOW 25: CPT

## 2025-02-08 RX ORDER — BISACODYL 5 MG
10 TABLET, DELAYED RELEASE (ENTERIC COATED) ORAL DAILY PRN
Qty: 30 TABLET | Refills: 0 | Status: SHIPPED | OUTPATIENT
Start: 2025-02-08

## 2025-02-08 RX ORDER — MIRTAZAPINE 15 MG/1
30 TABLET, FILM COATED ORAL NIGHTLY
Status: DISCONTINUED | OUTPATIENT
Start: 2025-02-08 | End: 2025-02-08 | Stop reason: HOSPADM

## 2025-02-08 RX ORDER — OLANZAPINE 2.5 MG/1
2.5 TABLET ORAL EVERY 8 HOURS PRN
Qty: 30 TABLET | Refills: 0 | Status: SHIPPED | OUTPATIENT
Start: 2025-02-08

## 2025-02-08 RX ORDER — ENOXAPARIN SODIUM 100 MG/ML
40 INJECTION SUBCUTANEOUS DAILY
Qty: 30 EACH | Refills: 0 | Status: SHIPPED | OUTPATIENT
Start: 2025-02-09

## 2025-02-08 RX ORDER — OLANZAPINE 5 MG/1
5 TABLET, ORALLY DISINTEGRATING ORAL NIGHTLY
Qty: 30 TABLET | Refills: 0 | Status: SHIPPED | OUTPATIENT
Start: 2025-02-08

## 2025-02-08 RX ORDER — BUSPIRONE HYDROCHLORIDE 7.5 MG/1
7.5 TABLET ORAL 2 TIMES DAILY
Qty: 60 TABLET | Refills: 0 | Status: SHIPPED | OUTPATIENT
Start: 2025-02-08

## 2025-02-08 RX ORDER — ONDANSETRON 4 MG/1
4 TABLET, ORALLY DISINTEGRATING ORAL EVERY 8 HOURS PRN
Qty: 20 TABLET | Refills: 0 | Status: SHIPPED | OUTPATIENT
Start: 2025-02-08

## 2025-02-08 RX ORDER — ACETAMINOPHEN 325 MG/1
650 TABLET ORAL EVERY 4 HOURS PRN
Qty: 30 TABLET | Refills: 0 | Status: SHIPPED | OUTPATIENT
Start: 2025-02-08

## 2025-02-08 RX ADMIN — ENOXAPARIN SODIUM 40 MG: 40 INJECTION SUBCUTANEOUS at 08:01

## 2025-02-08 RX ADMIN — OXYBUTYNIN CHLORIDE 5 MG: 5 TABLET ORAL at 08:01

## 2025-02-08 RX ADMIN — OLANZAPINE 2.5 MG: 2.5 TABLET, FILM COATED ORAL at 02:09

## 2025-02-08 RX ADMIN — BUSPIRONE HYDROCHLORIDE 7.5 MG: 5 TABLET ORAL at 08:01

## 2025-02-08 RX ADMIN — BISACODYL 10 MG: 5 TABLET, COATED ORAL at 16:44

## 2025-02-08 RX ADMIN — PANTOPRAZOLE SODIUM 40 MG: 40 TABLET, DELAYED RELEASE ORAL at 06:30

## 2025-02-08 ASSESSMENT — COGNITIVE AND FUNCTIONAL STATUS - GENERAL
PERSONAL GROOMING: A LITTLE
TOILETING: A LOT
MOVING TO AND FROM BED TO CHAIR: A LITTLE
TURNING FROM BACK TO SIDE WHILE IN FLAT BAD: A LITTLE
HELP NEEDED FOR BATHING: A LOT
STANDING UP FROM CHAIR USING ARMS: A LOT
MOVING FROM LYING ON BACK TO SITTING ON SIDE OF FLAT BED WITH BEDRAILS: A LITTLE
DRESSING REGULAR LOWER BODY CLOTHING: A LOT
DRESSING REGULAR UPPER BODY CLOTHING: A LITTLE
MOBILITY SCORE: 14
CLIMB 3 TO 5 STEPS WITH RAILING: TOTAL
WALKING IN HOSPITAL ROOM: A LOT
DAILY ACTIVITIY SCORE: 16

## 2025-02-08 ASSESSMENT — PAIN SCALES - GENERAL: PAINLEVEL_OUTOF10: 0 - NO PAIN

## 2025-02-08 NOTE — PROGRESS NOTES
Enmanuel Bhatia is a 89 y.o. male on day 1 of admission presenting with Recurrent falls.    Patient has a discharge order. [place] and AVS uploaded and sent via [facility]. Facility is ready to accept. Elvira FERRERA is setting up transport. 7000 completed. Patient will discharge to [facility] today.     Taina Cuenca RN

## 2025-02-08 NOTE — PROGRESS NOTES
"Enmanuel Bhatia is a 89 y.o. male on day 1 of admission presenting with Recurrent falls.      Subjective     The patient case was reviewed and discussed with the assigned staff. The patient remains somewhat redirect and appears confused at times due to cognitive issues. However, he maintains a pleasant demeanor and does not seem internally stimulated or agitated. During our encounter, the patient was sitting with nursing staff, displaying a calm and smiling attitude, and expressing that he feels safe overall.    The patient is awaiting transfer today. He mentioned that his wife did not visit but expressed appreciation for the friendliness of the staff, despite not being aware that he is currently in the hospital. We offered him the opportunity to ask questions, but he did not have any at this time.    We appreciate being involved in the patient’s case and will sign off. Please feel free to reach out if there are any further questions or concerns related to this case.       Objective     Last Recorded Vitals  Blood pressure 121/66, pulse 77, temperature 36.8 °C (98.2 °F), temperature source Oral, resp. rate 18, height 1.778 m (5' 10\"), weight 82 kg (180 lb 12.4 oz), SpO2 98%.    Review of Systems   Unable to perform ROS: Dementia       Psychiatric ROS - Adult  Anxiety: General Anxiety Disorder (MILTON)MILTON Behaviors: difficulty concentrating and restlessness  Depression: sleep decreased  and withdrawn  Delirium: acute inattention, disorientation, sleep-wake abnormal, and waxing and waning  Psychosis: negative  Anna: negative  Safety Issues: None  Psychiatric ROS Comment: As noted    Physical Exam      Mental Status Exam  General Appearance: Fairly groomed.  Gait/Station: Within normal limits  Speech: Slow speech  Mood: \"good\"  Affect: Congruent with mood and topic of conversation  Thought Process: Tangential  Thought Associations: Severe loosening of associations and Occasional derailment  Thought Content: " tangential  Perception: No perceptual abnormalities noted  Level of Consciousness:  Altered and confused  Orientation: Not oriented to place, time/date, situation, day of week, month of year, and year  Attention and Concentration: Unable to assess due to  patient’s confusion, we are unable to effectively assess their attention and concentration at this time.     Recent Memory: Impaired as evidenced by difficulty recalling details from the past 24 hours  and Impaired as evidenced by confabulation   Remote Memory: Impaired as evidenced by difficulty recalling previous medical issues , Impaired as evidenced by difficulty recalling events from childhood , and Impaired as evidenced by confabulation   Executive function: Impaired as evidenced by Inattention and poor impulse control due to confusion   Language: Word finding difficulties  Fund of knowledge: Severely limited  Insight: Severely impaired, as evidenced by inability to understand and lack of awareness    Judgment: Limited, as evidence by inability to reason through medical decision making and diminished ability to reason    Psychiatric Risk Assessment  Violence Risk Assessment: male and other current mental state of confusion  Acute Risk of Harm to Others is Considered: low   Suicide Risk Assessment: age > 65 yrs old, , and male  Protective Factors against Suicide: positive family relationships  Acute Risk of Harm to Self is Considered: low    Relevant Results               Assessment/Plan   Assessment & Plan  Recurrent falls    Fall  -fell after PT today - wife could  not get him up -EMS called  -CT cervical spine, CT head, CXR and XR pelvis as above  -fall precautions  -PT/OT consulted  -unclear if lightheaded/dizzy or mechanical fall  -EKG as above  -tele monitoring  Gait instability  -fall at home after PT  -follows with Dr. Quigley outpatient for multifactoral gait disorder with paresthesias  -resume PTA Neurontin  -fall precautions  -PT/OT  consulted  -may need placement  Memory impairment  -poor historian  -history of normal pressure hydrocepalus  -cannot recall why he is in hospital   -states he drinks 2 beers nightly - wife states he hasn't drank any alcohol for  many years  Benign prostatic hyperplasia with lower urinary tract symptoms  See below  Mixed stress and urge urinary incontinence  -follows with Dr. Martinez outpt  -U/A as above-no active signs of UTI, no leukocytosis  -resume PTA [facility]  History of depression  -resume PTA Buspirone  Murmur, cardiac  -noted +murmur on exam  -consider echo/cardiology consultation as needed  -tele monitoring  Depression             -  Continue Remeron 15 mg PO at bedtime.   2.  Anxiety              - Continue Buspar  7.5 mg PO, BID, recommended dose to manage anxiety, denies any side effects from dosage increase              - Continue Gabapentin 300 mg PO can also help with anxiety  3.   Agitation   - Can give Zyprexa 2.5 mg PO PRN  or  2.5 mg IM if refusing PO to help with behavioral disturbances and poor impulse control.  - Continue Zyprexa zydis 5 mg PO at bedtime scheduled. No SE reported pt had a good night sleep as a result.    The patient is currently on multiple medications to help manage behaviors stemming from his cognitive state. We will continue to monitor the effectiveness of this recent medication regimen. If there is no significant improvement, we will reassess the situation tomorrow and consider starting the patient on a low dose of Depakote at 125 mg BID, which may provide additional support in managing his symptoms.    We appreciate the nursing staff for their excellent work in maintaining patient safety and effectively redirecting him, ensuring that he does not feel ostracized or trapped. The patient may not be able at understanding current environment, which exacerbates his delirium, contributing to his appearance of agitation when, in fact, he may simply be confused.       - Would  also recommend STRICT delirium precautions:       -- Minimize use of benzos, opiates, anticholinergics, as these may worsen mental status   -- Would use caution with narcotic pain medications   -- Would still adequately controlling pain, as uncontrolled pain is also a risk factor for delirium   -- Reinforce sleep hygiene; encourage patient to stay awake during the day   -- Keep curtains/blinds open during the day and closed at night.   -- Would recommend reorienting/redirecting patient as much as possible,    -- Aim for consistent staffing, familiar objects, avoiding bright lights and loud noises, etc    The patient currently does not meet the criteria for the inpatient psychiatric treatment.      Thank you for allowing us to participate in the care of this patient.         I personally spent 29  minutes today providing care for this patient, including preparation, face to face time, documentation and other services such as review of medical records, diagnostic result, collaboration with primary team and providers, patient education, counseling, coordination of care as specified in the encounter.       Parts of this chart have been completed using voice recognition software.  Please excuse any errors of transcription.  Despite the medical decision making time stamp, my medical decision making has taken place during the patient's entire visit.  Thought process and reason for plan has been formulated from the time that I saw the patient until the time of disposition and is not specific to one specific moment during their visit and furthermore the medical decision making encompasses the entire chart and not only that represented in this note.      Priscilla Reece, PMHNP-BC  Psychiatry, [facility]  1* contact: [employer] preferred

## 2025-02-08 NOTE — CARE PLAN
Problem: Safety - Adult  Goal: Free from fall injury  Outcome: Progressing     Problem: Discharge Planning  Goal: Discharge to home or other facility with appropriate resources  Outcome: Progressing     Problem: Chronic Conditions and Co-morbidities  Goal: Patient's chronic conditions and co-morbidity symptoms are monitored and maintained or improved  Outcome: Progressing     Problem: Nutrition  Goal: Nutrient intake appropriate for maintaining nutritional needs  Outcome: Progressing     Problem: Fall/Injury  Goal: Not fall by end of shift  Outcome: Progressing  Goal: Be free from injury by end of the shift  Outcome: Progressing  Goal: Verbalize understanding of personal risk factors for fall in the hospital  Outcome: Progressing  Goal: Verbalize understanding of risk factor reduction measures to prevent injury from fall in the home  Outcome: Progressing  Goal: Use assistive devices by end of the shift  Outcome: Progressing  Goal: Pace activities to prevent fatigue by end of the shift  Outcome: Progressing     Problem: Bathing  Goal: LTG - Patient will utilize adaptive techniques to bathe body with close supervision and set up  Outcome: Progressing     Problem: Dressings Lower Extremities  Goal: LTG - Patient will dress lower body with close supervision and set up  Outcome: Progressing     Problem: Toileting  Goal: LTG - Patient will complete daily toileting tasks with close supervision and 2ww  Outcome: Progressing   The patient's goals for the shift include Rest well & safety    The clinical goals for the shift include maintain safety    Over the shift, the patient did not make progress toward the following goals. Barriers to progression include . Recommendations to address these barriers include .

## 2025-02-08 NOTE — PROGRESS NOTES
Enmanuel Bhatia is a 89 y.o. male on day 1 of admission presenting with Recurrent falls.    Subjective   Interval History:   Awake, responsive  Discussed with nursing  Afebrile, no chills  No cough, chest pain or shortness of breath  No nausea vomiting or diarrhea    No new complaints       Objective   Range of Vitals (last 24 hours)  Heart Rate:  [73-91]   Temp:  [36.3 °C (97.3 °F)-36.7 °C (98.1 °F)]   Resp:  [17-18]   BP: (112-126)/(53-82)   SpO2:  [95 %-97 %]   Daily Weight  02/04/25 : 82 kg (180 lb 12.4 oz)    Body mass index is 25.94 kg/m².    Physical Exam  Constitutional:       Appearance: Normal appearance.   HENT:      Head: Atraumatic.      Nose: Nose normal.   Eyes:      General: No scleral icterus.     Extraocular Movements: Extraocular movements intact.      Conjunctiva/sclera: Conjunctivae normal.   Cardiovascular:      Rate and Rhythm: Normal rate and regular rhythm.      Heart sounds: Normal heart sounds.   Pulmonary:      Effort: Pulmonary effort is normal.      Breath sounds: Normal breath sounds.   Abdominal:      General: Bowel sounds are normal.      Palpations: Abdomen is soft.      Tenderness: There is no abdominal tenderness.   Musculoskeletal:      Cervical back: Normal range of motion and neck supple.      Right lower leg: No edema.      Left lower leg: No edema.   Skin:     General: Skin is warm and dry.   Neurological:      Mental Status: He is alert.   Psychiatric:         Behavior: Behavior normal.           Antibiotics  This patient does not have an active medication from one of the medication groupers.    Relevant Results  Labs  Results from last 72 hours   Lab Units 02/06/25  0915   WBC AUTO x10*3/uL 8.3  8.3   HEMOGLOBIN g/dL 14.5  14.5   HEMATOCRIT % 41.6  41.6   PLATELETS AUTO x10*3/uL 228  228   NEUTROS PCT AUTO % 75.2   LYMPHS PCT AUTO % 13.6   MONOS PCT AUTO % 8.6   EOS PCT AUTO % 1.7     Results from last 72 hours   Lab Units 02/06/25  0915   SODIUM mmol/L 138   POTASSIUM  mmol/L 4.4   CHLORIDE mmol/L 104   CO2 mmol/L 29   BUN mg/dL 16   CREATININE mg/dL 1.03   GLUCOSE mg/dL 97   CALCIUM mg/dL 9.0   ANION GAP mmol/L 9*   EGFR mL/min/1.73m*2 69     Results from last 72 hours   Lab Units 02/06/25  0915   ALK PHOS U/L 73   BILIRUBIN TOTAL mg/dL 0.8   PROTEIN TOTAL g/dL 5.8*   ALT U/L 10   AST U/L 15   ALBUMIN g/dL 3.5     Estimated Creatinine Clearance: 50.2 mL/min (by C-G formula based on SCr of 1.03 mg/dL).  C-Reactive Protein   Date Value Ref Range Status   02/08/2024 <0.30 0.00 - 2.00 mg/dL Final     Microbiology  Reviewed-urine culture negative   Imaging  ECG 12 lead    Result Date: 2/4/2025   Poor data quality, interpretation may be adversely affected Normal sinus rhythm Left axis deviation Septal infarct , age undetermined Abnormal ECG When compared with ECG of 05-SEP-2019 17:34, Septal infarct is now Present T wave inversion no longer evident in Inferior leads Confirmed by Davis Puri ([id number]) on 2/4/2025 2:37:17 PM    CT cervical spine wo IV contrast    Result Date: 2/3/2025  Interpreted By:  Derick Dallas, STUDY: CT CERVICAL SPINE WO IV CONTRAST;  2/3/2025 6:07 pm   INDICATION: Signs/Symptoms:fall, possible head injury.     COMPARISON: CT cervical spine 01/24/2023   ACCESSION NUMBER(S): [id number]   ORDERING CLINICIAN: JUAN DICKINSON   TECHNIQUE: Axial CT images of the cervical spine are obtained. Axial, coronal and sagittal reconstructions are provided for review.   FINDINGS:     Fractures: There is no evidence for an acute fracture of the cervical spine.   Vertebral Alignment: No obvious posttraumatic malalignment.   Craniocervical Junction: The odontoid process and craniocervical junction are intact.   Vertebrae/Disc Spaces:  Severe multilevel spondylosis. Multilevel disc space narrowing. Multilevel facet arthropathy Multilevel uncovertebral hypertrophy.Multilevel osteophyte formation.Chronic appearing ossicle adjacent to the left lateral mass of C1.    Prevertebral/Paraspinal Soft Tissues: The prevertebral and paraspinal soft tissues are unremarkable.         Multilevel spondylosis without acute osseous abnormality of the cervical spine.   MACRO: None   Signed by: Derick Dallas 2/3/2025 6:35 PM Dictation workstation:   [id number]    CT head wo IV contrast    Result Date: 2/3/2025  Interpreted By:  Derick Dallas, STUDY: CT HEAD WO IV CONTRAST;  2/3/2025 6:07 pm   INDICATION: Signs/Symptoms:fall, possible head injury.   COMPARISON: Head CT 02/03/2024   ACCESSION NUMBER(S): [id number]   ORDERING CLINICIAN: JUAN DICKINSON   TECHNIQUE: Noncontrast axial CT scan of head was performed. Angled reformats in brain and bone windows were generated. The images were reviewed in bone, brain, blood and soft tissue windows.   FINDINGS: CSF Spaces: Ventricles appear stable respect to size and configuration. There is no extraaxial fluid collection.   Parenchyma: Periventricular and subcortical white matter hypoattenuation is nonspecific, however likely reflects chronic microvascular ischemic versus chronic hypertensive changes. The grey-white differentiation is intact. There is no mass effect or midline shift.  There is no intracranial hemorrhage.   Calvarium: No acute displaced calvarial fracture.   Paranasal sinuses and mastoids: Bilateral ethmoid sinus and right maxillary mucosal thickening.       No CT evidence of acute intracranial injury.       MACRO: None     Signed by: Derick Dallas 2/3/2025 6:33 PM Dictation workstation:   [id number]    XR chest 1 view    Result Date: 2/3/2025  Interpreted By:  Kailash Haley, STUDY: XR CHEST 1 VIEW;  2/3/2025 5:52 pm   INDICATION: Signs/Symptoms:fall.     COMPARISON: None.   ACCESSION NUMBER(S): [id number]   ORDERING CLINICIAN: JUAN DICKINSON   FINDINGS:         CARDIOMEDIASTINAL SILHOUETTE: Cardiomediastinal silhouette is normal in size and configuration.   LUNGS: Lungs are clear. There is no consolidation or effusion.    ABDOMEN: No remarkable upper abdominal findings.   BONES: No acute osseous changes.       1.  No evidence of acute cardiopulmonary process.       MACRO: None   Signed by: Kailash Haley 2/3/2025 5:59 PM Dictation workstation:   [id number]    XR pelvis 1-2 views    Result Date: 2/3/2025  Interpreted By:  Kailash Haley, STUDY: XR PELVIS 1-2 VIEWS; ;  2/3/2025 5:52 pm   INDICATION: Signs/Symptoms:fall.     COMPARISON: None.   ACCESSION NUMBER(S): [id number]   ORDERING CLINICIAN: JUAN DICKINSON   FINDINGS: Pelvis, single view   There is no fracture. There is no dislocation. There are no degenerative changes. There is no lytic or sclerotic lesion. There is no soft tissue abnormality seen. Partially visualized spondylosis in the lower lumbar spine.       No acute abnormality in the pelvis.   MACRO: None   Signed by: Kailash Haley 2/3/2025 5:59 PM Dictation workstation:   [id number]     Assessment/Plan   Encephalopathy, etiology unclear, resolving  Pyuria-negative initial urine culture, repeat cultures negative         No antibiotics-repeat urine culture negative, afebrile, vitals stable   Monitor temperature and WBC  Discharge planning    Total time spent caring for the patient today was 15 minutes. This includes time spent before the visit reviewing the chart, time spent during the visit, and time spent after the visit on documentation       JOSÉ Daniels-CNP

## 2025-02-08 NOTE — CONSULTS
"Reason For Consult  Voiding issues    History Of Present Illness  Enmanuel Bhatia is a 89 y.o. male presenting with a fall at home.  He was found on the floor.  He was probably attempting to ambulate on his own and fell but this was unwitnessed.  He has a history of BPH and urinary incontinence.  He is followed by Dr. Charles he was last seen by Dr. Charles last week.  He has been on an anticholinergic and Flomax.  He has been voiding here but has had postvoid residuals of about 200 cc.  A Goyal catheter was placed here as because of his fall and bedrest he has been having some difficulty voiding.     Past Medical History  He has a past medical history of Benign prostatic hyperplasia without lower urinary tract symptoms, Gait instability, GERD (gastroesophageal reflux disease), Hydrocephalus, Memory impairment, Paresthesias, Personal history of other diseases of the musculoskeletal system and connective tissue, Small bowel obstruction (Multi), and Urinary incontinence.    Surgical History  He has a past surgical history that includes Other surgical history (07/22/2019) and Colon surgery.     Social History  He reports that he has quit smoking. His smoking use included cigarettes. He has never used smokeless tobacco. He reports that he does not currently use alcohol. He reports that he does not use drugs.    Family History  No family history on file.     Allergies  Patient has no known allergies.    Review of Systems  As per admission HPI     Physical Exam  Currently asleep.  Normal respiratory pattern  Abdomen: Soft and nontender  : Goyal catheter in place     Last Recorded Vitals  Blood pressure 124/82, pulse 73, temperature 36.7 °C (98.1 °F), temperature source Oral, resp. rate 18, height 1.778 m (5' 10\"), weight 82 kg (180 lb 12.4 oz), SpO2 96%.    Relevant Results  {If you would like to pull in Medications, type .meds     If you would like to pull in Lab results for the last 24 hours, type " .[id number]    If you would like to pull in Imaging results, type .imgrslt :99}    No results found for this or any previous visit (from the past 24 hours).  No results found.      Assessment/Plan     BPH: He has BPH and has been on Flomax and oxybutynin.  He just recently saw his urologist last week and he was stable.  At this point, since he is here with a fall and is in bed I would keep the Goyal catheter in.  He can continue on his Flomax and oxybutynin.  At discharge, I would have him go home with the Goyal and follow-up with Dr. Charles.    I spent *** minutes in the professional and overall care of this patient.      John De La Rosa MD     No

## 2025-02-09 NOTE — NURSING NOTE
Patient picked up by [facility] ambulance. Patient stable ,no signs or symptoms of distress,pain denied. Patient safe to go.All belongings gathered and sent with patient.

## 2025-02-09 NOTE — PROGRESS NOTES
Enmanuel Bhatia is a 89 y.o. male on day 1 of admission presenting with Recurrent falls.      Subjective   Patient pleasant confused for rehab       Objective     Last Recorded Vitals  /66 (BP Location: Left arm, Patient Position: Lying)   Pulse 77   Temp 36.8 °C (98.2 °F) (Oral)   Resp 18   Wt 82 kg (180 lb 12.4 oz)   SpO2 98%   Intake/Output last 3 Shifts:    Intake/Output Summary (Last 24 hours) at 2/8/2025 1925  Last data filed at 2/8/2025 1625  Gross per 24 hour   Intake 1432.5 ml   Output 2950 ml   Net -1517.5 ml       Admission Weight  Weight: 81.6 kg (180 lb) (02/03/25 1704)    Daily Weight  02/04/25 : 82 kg (180 lb 12.4 oz)    Image Results      Physical Exam/pleasant confused    Relevant Results               Assessment/Plan          This patient has a urinary catheter   Reason for the urinary catheter remaining today?           Assessment & Plan  Recurrent falls    Fall  -fell after PT today - wife could  not get him up -EMS called  -CT cervical spine, CT head, CXR and XR pelvis as above  -fall precautions  -PT/OT consulted  -unclear if lightheaded/dizzy or mechanical fall  -EKG as above  -tele monitoring  Gait instability  -fall at home after PT  -follows with Dr. Quigley outpatient for multifactoral gait disorder with paresthesias  -resume PTA Neurontin  -fall precautions  -PT/OT consulted  -may need placement  Memory impairment  -poor historian  -history of normal pressure hydrocepalus  -cannot recall why he is in hospital   -states he drinks 2 beers nightly - wife states he hasn't drank any alcohol for  many years  Benign prostatic hyperplasia with lower urinary tract symptoms  See below  Mixed stress and urge urinary incontinence  -follows with Dr. Martinez outpt  -U/A as above-no active signs of UTI, no leukocytosis  -resume PTA [facility]  History of depression  -resume PTA Buspirone  Murmur, cardiac  -noted +murmur on exam  -consider echo/cardiology consultation as needed  -tele  monitoring  Pleasant confused, outlet obstruction,Goyal catheter              Moon De La Paz MD

## 2025-02-13 ENCOUNTER — NURSING HOME VISIT (OUTPATIENT)
Dept: POST ACUTE CARE | Facility: EXTERNAL LOCATION | Age: OVER 89
End: 2025-02-13
Payer: MEDICARE

## 2025-02-13 DIAGNOSIS — M19.90 OSTEOARTHRITIS, UNSPECIFIED OSTEOARTHRITIS TYPE, UNSPECIFIED SITE: ICD-10-CM

## 2025-02-13 DIAGNOSIS — R29.6 RECURRENT FALLS: ICD-10-CM

## 2025-02-13 DIAGNOSIS — R11.2 NAUSEA AND VOMITING, UNSPECIFIED VOMITING TYPE: ICD-10-CM

## 2025-02-13 DIAGNOSIS — N40.1 BENIGN PROSTATIC HYPERPLASIA WITH URINARY FREQUENCY: ICD-10-CM

## 2025-02-13 DIAGNOSIS — K21.9 GASTROESOPHAGEAL REFLUX DISEASE, UNSPECIFIED WHETHER ESOPHAGITIS PRESENT: ICD-10-CM

## 2025-02-13 DIAGNOSIS — G62.9 NEUROPATHY: ICD-10-CM

## 2025-02-13 DIAGNOSIS — F03.90 DEMENTIA, UNSPECIFIED DEMENTIA SEVERITY, UNSPECIFIED DEMENTIA TYPE, UNSPECIFIED WHETHER BEHAVIORAL, PSYCHOTIC, OR MOOD DISTURBANCE OR ANXIETY (MULTI): Primary | ICD-10-CM

## 2025-02-13 DIAGNOSIS — G91.2 NORMAL PRESSURE HYDROCEPHALUS (MULTI): ICD-10-CM

## 2025-02-13 DIAGNOSIS — R41.3 MEMORY IMPAIRMENT: ICD-10-CM

## 2025-02-13 DIAGNOSIS — R26.81 GAIT INSTABILITY: ICD-10-CM

## 2025-02-13 DIAGNOSIS — R01.1 MURMUR, CARDIAC: ICD-10-CM

## 2025-02-13 DIAGNOSIS — R35.0 BENIGN PROSTATIC HYPERPLASIA WITH URINARY FREQUENCY: ICD-10-CM

## 2025-02-13 DIAGNOSIS — F32.9 MAJOR DEPRESSIVE DISORDER WITH CURRENT ACTIVE EPISODE, UNSPECIFIED DEPRESSION EPISODE SEVERITY, UNSPECIFIED WHETHER RECURRENT: ICD-10-CM

## 2025-02-13 PROCEDURE — 99310 SBSQ NF CARE HIGH MDM 45: CPT | Performed by: NURSE PRACTITIONER

## 2025-02-13 NOTE — LETTER
Patient: Enmanuel Bhatia  : 1935    Encounter Date: 2025    Name: Enmanuel Bhatia    YOB: 1935    Code Status:  FULL CODE    Chief Complaint:  Initial visit; new patient:  Dementia; etc....       HPI      89 y.o. male with past medical history significant for normal pressure hydrocephalus, BPH, gait instability, GERD, small bowel obstruction, and memory impairment, dementia, depressive.    HOSPITAL COURSE:  Patient presented to the [facility] ER after he sustained a fall at home.   Wife stated that after therapy, she found him on the living room floor.  and she believes he attempted to ambulate on his own.   Wife stated that he uses a walker, but often only uses one hand on walker when attempting to walk.    CT head and cervical spine were performed and were negative.  Wife stated that the patient urologist is  Dr. Martinez for BPH and urinary incontinence.  He also sees Dr. Quigley, the neurologist for his hydrocephalus and gait disorder.   Confirmed with wife that patient's PCP is Dr. Pacheco and was last seen 2 weeks ago.   Wife stated that the patient has not been ill recently, no fevers, chills, chest pain, shortness of breath, abdominal pain, nausea, vomiting, or diarrhea.   She did endorse urinary leakage and frequency.   Infectious disease was consulted.   Corona virus, influenza and RSV tests were performed.  Patient had encephalopathy but the etiology was unclear.  Psychiatry was consulted.  According to the chart review, the patient has experienced recorded episodes of agitation in the past, which have necessitated the use of medications and even restraints.  During psych evaluation the patient remained confused about his location, unable to clearly state whether he is in the hospital, a restaurant, or a [employer]. He did maintain a positive attitude and was pleasant throughout the interaction.  He struggled to follow the conversation, often confabulating stories and providing  "irrelevant answers.  The patient has a documented history of dementia and major depressive disorder from previous admissions.   He is alert only to person but not to place, time, or situation.   The patient was able to recall his birthday, which demonstrated some cognitive awareness. No  hallucinations were note.   The patient denied any suicidal ideations.         Patient was admitted to [facility] from 2/3/25 to 2/8/25 (5 days).    Patient admitted to [facility] for skilled services on 2/8/25.    Hospital and chronic diagnoses as follows:    Dementia; Memory impairment:  Patient is a poor historian.  Also has normal pressure hydrocepalus  He could not remember why he is in hospital.  In the hospital daughter reported that that he has \"water on the brain\" that causes him several issues.   He can remember things from the past but difficulty with remember anything recent, but is able to recognize her and the grandchildren faces and names.   States that patient spends most of his time sitting in the chair watching TV.   Does use a walker to ambulate but has significant difficulty. He doesn't drive, doesn't manage the finances and needs assistance with dressing.   He is incontinent of urine.    On Buspirone, Remeron and Zyprexa.  Patient seen today.  He is sitting up in a wheelchair.  Cooperative but confused.  Follows commands.  No complaints of pain.  No headaches or dizziness.  No chest pain.    Benign prostatic hyperplasia with lower urinary tract symptoms;  Mixed stress and urge urinary incontinence:  Discharged with a angeles.  Orders for void trial with post void residual for 3 days if greater than 300 ml reinsert angeles.  Discharged on Trospium.   Patient follows with urologist Dr. Martinez.  U/A performed in the hospital.  No UTI or  leukocytosis.  No fevers.     Gait instability; Recurrent falls:  Patient fell after PT at home.  Wife could not get him up and she called EMS,  CT cervical " spine, CT head, CXR and XR pelvis were performed.  Patient needs fall precautions  PT/OT consulted  Unclear if lightheaded/dizzy or mechanical fall  EKG was performed in the hospital and patient was monitored on telemetry.  Follows with Dr. Quigley outpatient for multifactoral gait disorder with paresthesias.    Depression and generalized anxiety disorder.  Evaluated by psychiatry in the hospital.  On Buspirone, Remeron and Zyprexa.  Haldol was stopped and Zyprexa was started in the hospital.    Normal pressure hydrocephalus-idiopathic:   Pt is followed by his neurologist Dr Quigley for dementia, normal pressure hydrocephalus and gait disorder. Was seen by Dr Berg in 2019 who wanted to perform a LP and see if there was any improvement in his condition.   Pt never followed through with the testing.   No complaints of headaches or dizziness.    Osteoarthritis; neuropathy:  On acetaminophen PRN for OA.  On gabapentin for neuropathy.   No complaints of pain or neuropathy.     GERD:  On omeprazole.  No complaints of reflux.    Nausea and vomiting;  On Zofran.  No complaints of N/V.    Murmur, cardiac  +murmur was found in the hospital.  consider echo/cardiology consultation as needed  Was on tele monitoring                             Reviewed  hospital records from recent hospitalization.  Reviewed  EMR  Reviewed medical, social, surgical and family history.  Reviewed all current medications and performed medication reconciliation.  Performed prescription drug management.  Reviewed vital signs AND lab results  Reviewed [facility] Documentation  Discussed patient with nursing.   Spent greater than 50 minutes on patient visit                  ROS: 10 point ROS performed; Negative unless noted in HPI.     Medical History:   Diagnosis Date   • Benign prostatic hyperplasia without lower urinary tract symptoms       Enlarged prostate   • Gait instability     • GERD (gastroesophageal reflux disease)     •  "Hydrocephalus     • Memory impairment     • Paresthesias     • Personal history of other diseases of the musculoskeletal system and connective tissue       History of arthritis   • Small bowel obstruction (Multi)     • Urinary incontinence       Surgical History:   Procedure Laterality Date   • COLON SURGERY       • OTHER SURGICAL HISTORY   07/22/2019     No history of surgery     SOCIAL HISTORY:  Former smoker  Used to smoke cigarettes.  No alcohol use.   No illicit drug use    FAMILY HISTORY:  No family history available.     ALLERGIES:  No known allergies.       Lab Results   Component Value Date    WBC 8.3 02/06/2025    WBC 8.3 02/06/2025    HGB 14.5 02/06/2025    HGB 14.5 02/06/2025    HCT 41.6 02/06/2025    HCT 41.6 02/06/2025     02/06/2025     02/06/2025    ALT 10 02/06/2025    AST 15 02/06/2025     02/06/2025    K 4.4 02/06/2025     02/06/2025    CREATININE 1.03 02/06/2025    BUN 16 02/06/2025    CO2 29 02/06/2025    TSH 3.34 07/19/2022    INR 1.0 03/31/2021             /82   Pulse 84   Temp 36.6 °C (97.8 °F)   Resp 18   Ht 1.778 m (5' 10\")   Wt 81.6 kg (180 lb)   SpO2 96%   BMI 25.83 kg/m²      Physical Exam  Vitals and nursing note reviewed.   Constitutional:       General: He is awake.      Appearance: He is ill-appearing.   HENT:      Head: Normocephalic.      Right Ear: External ear normal.      Left Ear: External ear normal.      Nose: Nose normal.      Mouth/Throat:      Mouth: Mucous membranes are moist.      Pharynx: Oropharynx is clear.   Eyes:      Extraocular Movements: Extraocular movements intact.      Conjunctiva/sclera: Conjunctivae normal.      Pupils: Pupils are equal, round, and reactive to light.   Cardiovascular:      Rate and Rhythm: Normal rate and regular rhythm.      Pulses: Normal pulses.      Heart sounds: Murmur heard.   Pulmonary:      Effort: Pulmonary effort is normal.      Breath sounds: Normal breath sounds.   Abdominal:      General: " Bowel sounds are normal.      Palpations: Abdomen is soft.   Musculoskeletal:         General: Normal range of motion.      Cervical back: Normal range of motion and neck supple.      Comments: Generalized muscle weakness   Skin:     General: Skin is warm and dry.   Neurological:      General: No focal deficit present.      Mental Status: He is alert. Mental status is at baseline. He is confused.      Sensory: Sensation is intact.      Motor: Weakness present.      Coordination: Coordination abnormal.      Gait: Gait abnormal.      Comments: Dementia   Psychiatric:         Mood and Affect: Mood normal. Affect is inappropriate.         Behavior: Behavior is slowed. Behavior is cooperative.         Cognition and Memory: Cognition is impaired. Memory is impaired.         Judgment: Judgment is inappropriate.      Comments: Dementia        Assessment/Plan   Ordered CMP and CBC with diff. For tomorrow.    Dementia; Memory impairment:  Evaluated by psychiatrist in the hospital.   Continue Buspirone, Remeron and Zyprexa.    Benign prostatic hyperplasia with lower urinary tract symptoms;  Mixed stress and urge urinary incontinence:  Discharged with a angeles.  Orders for void trial with post void residual for 3 days if greater than 300 ml reinsert angeles.  Continue Trospium.   Follow up with urologist Dr. Martinez.  Monitor for s/s of UTI and urinary retention.    Gait instability; Recurrent falls:  Patient fell after PT at home.  Wife could  not get him up -EMS called  CT cervical spine, CT head, CXR and XR pelvis as above  Fall prevention strategies.  PT/OT consulted in the hospital.  Continue with  Dr. Quigley outpatient for multifactoral gait disorder with paresthesias.  Continue at [facility] for skilled [facility].     Depression and generalized anxiety disorder.  Evaluated by psychiatry in the hospital.  On Buspirone, Remeron and Zyprexa.  Haldol was stopped and Zyprexa was started in the hospital.  Follow up with  psychiatrist.    Normal pressure hydrocephalus-idiopathic:  Pt is followed by his neurologist Dr Quigley for dementia, normal pressure hydrocephalus and gait disorder.     Osteoarthritis; neuropathy:  Continue acetaminophen PRN for OA.  Continue gabapentin for neuropathy.     GERD:  Continue omeprazole.  Monitor for reflux.    Nausea and vomiting;  Continue Zofran.  Monitor for N/V    Murmur, cardiac:  +murmur was found in the hospital.  Follow up with cardiology.       Problem List Items Addressed This Visit       Gait instability    Recurrent falls    Memory impairment    Benign prostatic hyperplasia with lower urinary tract symptoms    Murmur, cardiac     Other Visit Diagnoses       Dementia, unspecified dementia severity, unspecified dementia type, unspecified whether behavioral, psychotic, or mood disturbance or anxiety (Multi)    -  Primary    Major depressive disorder with current active episode, unspecified depression episode severity, unspecified whether recurrent        Normal pressure hydrocephalus (Multi)        Osteoarthritis, unspecified osteoarthritis type, unspecified site        Neuropathy        Gastroesophageal reflux disease, unspecified whether esophagitis present        Nausea and vomiting, unspecified vomiting type                GUILLERMINA Santillan       Electronically Signed By: GUILLERMINA Santillan   2/16/25  8:32 PM

## 2025-02-16 VITALS
WEIGHT: 180 LBS | SYSTOLIC BLOOD PRESSURE: 127 MMHG | TEMPERATURE: 97.8 F | RESPIRATION RATE: 18 BRPM | OXYGEN SATURATION: 96 % | HEART RATE: 84 BPM | HEIGHT: 70 IN | DIASTOLIC BLOOD PRESSURE: 82 MMHG | BODY MASS INDEX: 25.77 KG/M2

## 2025-02-16 NOTE — PROGRESS NOTES
Name: Enmanuel Bhatia    YOB: 1935    Code Status:  FULL CODE    Chief Complaint:  Initial visit; new patient:  Dementia; etc....       HPI      89 y.o. male with past medical history significant for normal pressure hydrocephalus, BPH, gait instability, GERD, small bowel obstruction, and memory impairment, dementia, depressive.    HOSPITAL COURSE:  Patient presented to the [facility] ER after he sustained a fall at home.   Wife stated that after therapy, she found him on the living room floor.  and she believes he attempted to ambulate on his own.   Wife stated that he uses a walker, but often only uses one hand on walker when attempting to walk.    CT head and cervical spine were performed and were negative.  Wife stated that the patient urologist is  Dr. Martinez for BPH and urinary incontinence.  He also sees Dr. Quigley, the neurologist for his hydrocephalus and gait disorder.   Confirmed with wife that patient's PCP is Dr. Pacheco and was last seen 2 weeks ago.   Wife stated that the patient has not been ill recently, no fevers, chills, chest pain, shortness of breath, abdominal pain, nausea, vomiting, or diarrhea.   She did endorse urinary leakage and frequency.   Infectious disease was consulted.   Corona virus, influenza and RSV tests were performed.  Patient had encephalopathy but the etiology was unclear.  Psychiatry was consulted.  According to the chart review, the patient has experienced recorded episodes of agitation in the past, which have necessitated the use of medications and even restraints.  During psych evaluation the patient remained confused about his location, unable to clearly state whether he is in the hospital, a restaurant, or a [employer]. He did maintain a positive attitude and was pleasant throughout the interaction.  He struggled to follow the conversation, often confabulating stories and providing irrelevant answers.  The patient has a documented history of dementia and  "major depressive disorder from previous admissions.   He is alert only to person but not to place, time, or situation.   The patient was able to recall his birthday, which demonstrated some cognitive awareness. No  hallucinations were note.   The patient denied any suicidal ideations.         Patient was admitted to [facility] from 2/3/25 to 2/8/25 (5 days).    Patient admitted to [facility] for skilled services on 2/8/25.    Hospital and chronic diagnoses as follows:    Dementia; Memory impairment:  Patient is a poor historian.  Also has normal pressure hydrocepalus  He could not remember why he is in hospital.  In the hospital daughter reported that that he has \"water on the brain\" that causes him several issues.   He can remember things from the past but difficulty with remember anything recent, but is able to recognize her and the grandchildren faces and names.   States that patient spends most of his time sitting in the chair watching TV.   Does use a walker to ambulate but has significant difficulty. He doesn't drive, doesn't manage the finances and needs assistance with dressing.   He is incontinent of urine.    On Buspirone, Remeron and Zyprexa.  Patient seen today.  He is sitting up in a wheelchair.  Cooperative but confused.  Follows commands.  No complaints of pain.  No headaches or dizziness.  No chest pain.    Benign prostatic hyperplasia with lower urinary tract symptoms;  Mixed stress and urge urinary incontinence:  Discharged with a angeles.  Orders for void trial with post void residual for 3 days if greater than 300 ml reinsert angeles.  Discharged on Trospium.   Patient follows with urologist Dr. Martinez.  U/A performed in the hospital.  No UTI or  leukocytosis.  No fevers.     Gait instability; Recurrent falls:  Patient fell after PT at home.  Wife could not get him up and she called EMS,  CT cervical spine, CT head, CXR and XR pelvis were performed.  Patient needs fall " precautions  PT/OT consulted  Unclear if lightheaded/dizzy or mechanical fall  EKG was performed in the hospital and patient was monitored on telemetry.  Follows with Dr. Quigley outpatient for multifactoral gait disorder with paresthesias.    Depression and generalized anxiety disorder.  Evaluated by psychiatry in the hospital.  On Buspirone, Remeron and Zyprexa.  Haldol was stopped and Zyprexa was started in the hospital.    Normal pressure hydrocephalus-idiopathic:   Pt is followed by his neurologist Dr Quigley for dementia, normal pressure hydrocephalus and gait disorder. Was seen by Dr Berg in 2019 who wanted to perform a LP and see if there was any improvement in his condition.   Pt never followed through with the testing.   No complaints of headaches or dizziness.    Osteoarthritis; neuropathy:  On acetaminophen PRN for OA.  On gabapentin for neuropathy.   No complaints of pain or neuropathy.     GERD:  On omeprazole.  No complaints of reflux.    Nausea and vomiting;  On Zofran.  No complaints of N/V.    Murmur, cardiac  +murmur was found in the hospital.  consider echo/cardiology consultation as needed  Was on tele monitoring                             Reviewed  hospital records from recent hospitalization.  Reviewed  EMR  Reviewed medical, social, surgical and family history.  Reviewed all current medications and performed medication reconciliation.  Performed prescription drug management.  Reviewed vital signs AND lab results  Reviewed [facility] Documentation  Discussed patient with nursing.   Spent greater than 50 minutes on patient visit                  ROS: 10 point ROS performed; Negative unless noted in HPI.     Medical History:   Diagnosis Date   • Benign prostatic hyperplasia without lower urinary tract symptoms       Enlarged prostate   • Gait instability     • GERD (gastroesophageal reflux disease)     • Hydrocephalus     • Memory impairment     • Paresthesias     • Personal  "history of other diseases of the musculoskeletal system and connective tissue       History of arthritis   • Small bowel obstruction (Multi)     • Urinary incontinence       Surgical History:   Procedure Laterality Date   • COLON SURGERY       • OTHER SURGICAL HISTORY   07/22/2019     No history of surgery     SOCIAL HISTORY:  Former smoker  Used to smoke cigarettes.  No alcohol use.   No illicit drug use    FAMILY HISTORY:  No family history available.     ALLERGIES:  No known allergies.       Lab Results   Component Value Date    WBC 8.3 02/06/2025    WBC 8.3 02/06/2025    HGB 14.5 02/06/2025    HGB 14.5 02/06/2025    HCT 41.6 02/06/2025    HCT 41.6 02/06/2025     02/06/2025     02/06/2025    ALT 10 02/06/2025    AST 15 02/06/2025     02/06/2025    K 4.4 02/06/2025     02/06/2025    CREATININE 1.03 02/06/2025    BUN 16 02/06/2025    CO2 29 02/06/2025    TSH 3.34 07/19/2022    INR 1.0 03/31/2021             /82   Pulse 84   Temp 36.6 °C (97.8 °F)   Resp 18   Ht 1.778 m (5' 10\")   Wt 81.6 kg (180 lb)   SpO2 96%   BMI 25.83 kg/m²      Physical Exam  Vitals and nursing note reviewed.   Constitutional:       General: He is awake.      Appearance: He is ill-appearing.   HENT:      Head: Normocephalic.      Right Ear: External ear normal.      Left Ear: External ear normal.      Nose: Nose normal.      Mouth/Throat:      Mouth: Mucous membranes are moist.      Pharynx: Oropharynx is clear.   Eyes:      Extraocular Movements: Extraocular movements intact.      Conjunctiva/sclera: Conjunctivae normal.      Pupils: Pupils are equal, round, and reactive to light.   Cardiovascular:      Rate and Rhythm: Normal rate and regular rhythm.      Pulses: Normal pulses.      Heart sounds: Murmur heard.   Pulmonary:      Effort: Pulmonary effort is normal.      Breath sounds: Normal breath sounds.   Abdominal:      General: Bowel sounds are normal.      Palpations: Abdomen is soft. "   Musculoskeletal:         General: Normal range of motion.      Cervical back: Normal range of motion and neck supple.      Comments: Generalized muscle weakness   Skin:     General: Skin is warm and dry.   Neurological:      General: No focal deficit present.      Mental Status: He is alert. Mental status is at baseline. He is confused.      Sensory: Sensation is intact.      Motor: Weakness present.      Coordination: Coordination abnormal.      Gait: Gait abnormal.      Comments: Dementia   Psychiatric:         Mood and Affect: Mood normal. Affect is inappropriate.         Behavior: Behavior is slowed. Behavior is cooperative.         Cognition and Memory: Cognition is impaired. Memory is impaired.         Judgment: Judgment is inappropriate.      Comments: Dementia        Assessment/Plan    Ordered CMP and CBC with diff. For tomorrow.    Dementia; Memory impairment:  Evaluated by psychiatrist in the hospital.   Continue Buspirone, Remeron and Zyprexa.    Benign prostatic hyperplasia with lower urinary tract symptoms;  Mixed stress and urge urinary incontinence:  Discharged with a angeles.  Orders for void trial with post void residual for 3 days if greater than 300 ml reinsert angeles.  Continue Trospium.   Follow up with urologist Dr. Martinez.  Monitor for s/s of UTI and urinary retention.    Gait instability; Recurrent falls:  Patient fell after PT at home.  Wife could  not get him up -EMS called  CT cervical spine, CT head, CXR and XR pelvis as above  Fall prevention strategies.  PT/OT consulted in the hospital.  Continue with  Dr. Quigley outpatient for multifactoral gait disorder with paresthesias.  Continue at [facility] for skilled services.     Depression and generalized anxiety disorder.  Evaluated by psychiatry in the hospital.  On Buspirone, Remeron and Zyprexa.  Haldol was stopped and Zyprexa was started in the hospital.  Follow up with psychiatrist.    Normal pressure hydrocephalus-idiopathic:  Pt is  followed by his neurologist Dr Quigley for dementia, normal pressure hydrocephalus and gait disorder.     Osteoarthritis; neuropathy:  Continue acetaminophen PRN for OA.  Continue gabapentin for neuropathy.     GERD:  Continue omeprazole.  Monitor for reflux.    Nausea and vomiting;  Continue Zofran.  Monitor for N/V    Murmur, cardiac:  +murmur was found in the hospital.  Follow up with cardiology.       Problem List Items Addressed This Visit       Gait instability    Recurrent falls    Memory impairment    Benign prostatic hyperplasia with lower urinary tract symptoms    Murmur, cardiac     Other Visit Diagnoses       Dementia, unspecified dementia severity, unspecified dementia type, unspecified whether behavioral, psychotic, or mood disturbance or anxiety (Multi)    -  Primary    Major depressive disorder with current active episode, unspecified depression episode severity, unspecified whether recurrent        Normal pressure hydrocephalus (Multi)        Osteoarthritis, unspecified osteoarthritis type, unspecified site        Neuropathy        Gastroesophageal reflux disease, unspecified whether esophagitis present        Nausea and vomiting, unspecified vomiting type                Shell Brewer, APRN-CNP

## 2025-02-18 ENCOUNTER — NURSING HOME VISIT (OUTPATIENT)
Dept: POST ACUTE CARE | Facility: EXTERNAL LOCATION | Age: OVER 89
End: 2025-02-18
Payer: MEDICARE

## 2025-02-18 DIAGNOSIS — G62.9 NEUROPATHY: ICD-10-CM

## 2025-02-18 DIAGNOSIS — F41.1 GENERALIZED ANXIETY DISORDER: ICD-10-CM

## 2025-02-18 DIAGNOSIS — K21.9 GASTROESOPHAGEAL REFLUX DISEASE, UNSPECIFIED WHETHER ESOPHAGITIS PRESENT: ICD-10-CM

## 2025-02-18 DIAGNOSIS — F32.9 MAJOR DEPRESSIVE DISORDER WITH CURRENT ACTIVE EPISODE, UNSPECIFIED DEPRESSION EPISODE SEVERITY, UNSPECIFIED WHETHER RECURRENT: ICD-10-CM

## 2025-02-18 DIAGNOSIS — G91.2 NORMAL PRESSURE HYDROCEPHALUS (MULTI): ICD-10-CM

## 2025-02-18 DIAGNOSIS — R41.3 MEMORY IMPAIRMENT: ICD-10-CM

## 2025-02-18 DIAGNOSIS — M62.81 GENERALIZED MUSCLE WEAKNESS: ICD-10-CM

## 2025-02-18 DIAGNOSIS — R26.81 GAIT INSTABILITY: ICD-10-CM

## 2025-02-18 DIAGNOSIS — M19.90 OSTEOARTHRITIS, UNSPECIFIED OSTEOARTHRITIS TYPE, UNSPECIFIED SITE: ICD-10-CM

## 2025-02-18 DIAGNOSIS — R35.0 BENIGN PROSTATIC HYPERPLASIA WITH URINARY FREQUENCY: Primary | ICD-10-CM

## 2025-02-18 DIAGNOSIS — N39.46 MIXED STRESS AND URGE URINARY INCONTINENCE: ICD-10-CM

## 2025-02-18 DIAGNOSIS — Z75.8 DISCHARGE PLANNING ISSUES: ICD-10-CM

## 2025-02-18 DIAGNOSIS — N40.1 BENIGN PROSTATIC HYPERPLASIA WITH URINARY FREQUENCY: Primary | ICD-10-CM

## 2025-02-18 DIAGNOSIS — R29.6 RECURRENT FALLS: ICD-10-CM

## 2025-02-18 DIAGNOSIS — F03.90 DEMENTIA, UNSPECIFIED DEMENTIA SEVERITY, UNSPECIFIED DEMENTIA TYPE, UNSPECIFIED WHETHER BEHAVIORAL, PSYCHOTIC, OR MOOD DISTURBANCE OR ANXIETY (MULTI): ICD-10-CM

## 2025-02-18 PROCEDURE — 99309 SBSQ NF CARE MODERATE MDM 30: CPT | Performed by: NURSE PRACTITIONER

## 2025-02-18 NOTE — LETTER
"Patient: Enmanuel Bhatia  : 1935    Encounter Date: 2025    Name: Enmanuel Bhatia    YOB: 1935    Code Status:  FULL CODE    Chief Complaint:  Follow up on Benign prostatic hyperplasia with lower urinary tract symptoms; etc....       HPI      89 y.o. male with past medical history significant for normal pressure hydrocephalus, BPH, gait instability, GERD, small bowel obstruction, and memory impairment, dementia, depressive.      Patient was admitted to [facility] from 2/3/25 to 25 (5 days).    Patient admitted to [facility] services on 25.    Diagnoses as follows:    Benign prostatic hyperplasia with lower urinary tract symptoms;  Mixed stress and urge urinary incontinence:  Discharged with a angeles.  Orders for void trial with post void residual for 3 days if greater than 300 ml reinsert angeles.  Discharged on Trospium.   Nursing reports patient's Angeles was removed last week.  He has been voiding without any problems.   Patient follows with urologist Dr. Martinez.  No complaints of dysuria.  No costovertebral angle pain.  No fevers.   Patient seen today.   Cooperative but confused.  Follows commands.  No complaints of pain.  No headaches or dizziness.  No chest pain.    Dementia; Normal pressure hydrocephalus-idiopathic; Memory impairment:  Patient is a poor historian.  Also has normal pressure hydrocephalus.  In the hospital daughter reported that that he has \"water on the brain\" that causes him several issues.   He can remember things from the past but difficulty with remember anything recent,   but is able to recognize her and the grandchildren faces and names.   States that patient spends most of his time sitting in the chair watching TV.   He was using a walker at home with difficulty.   He doesn't drive, doesn't manage the finances and needs assistance with dressing.   On Buspirone, Remeron and Zyprexa.  Pt is followed by his neurologist Dr" Dann for dementia, normal pressure hydrocephalus and gait disorder.   Was seen by Dr Berg in 2019.  No complaints of headaches or dizziness.    Depression and generalized anxiety disorder.  Evaluated by psychiatry in the hospital.  On Buspirone, Remeron and Zyprexa.  Haldol was stopped and Zyprexa was started in the hospital.    Osteoarthritis; neuropathy:  On acetaminophen PRN for OA.  On gabapentin for neuropathy.   No complaints of pain or neuropathy.     Generalized muscle weakness; Gait instability; Recurrent falls:  Patient fell after PT at home.  Wife could not get him up and she called EMS,  CT cervical spine, CT head, CXR and XR pelvis were performed in the hospital.  Follows with Dr. Quigley outpatient for multifactoral gait disorder with paresthesias.  Patient at [facility] for skilled services PT/OT.     GERD:  On omeprazole.  No complaints of reflux.    Discharge Planning:  Patient will be transferring to another nursing facility later this week that is closer to their house.  He will be transferring to [facility].                           Reviewed  EMR  Reviewed medical, social, surgical and family history.  Reviewed all current medications and performed medication reconciliation.  Performed prescription drug management.  Reviewed vital signs AND lab results  Reviewed [facility] Documentation  Discussed patient with nursing.                   ROS: 10 point ROS performed; Negative unless noted in HPI.     Medical History:   Diagnosis Date   • Benign prostatic hyperplasia without lower urinary tract symptoms       Enlarged prostate   • Gait instability     • GERD (gastroesophageal reflux disease)     • Hydrocephalus     • Memory impairment     • Paresthesias     • Personal history of other diseases of the musculoskeletal system and connective tissue       History of arthritis   • Small bowel obstruction (Multi)     • Urinary incontinence       Surgical  "History:   Procedure Laterality Date   • COLON SURGERY       • OTHER SURGICAL HISTORY   07/22/2019     No history of surgery     SOCIAL HISTORY:  Former smoker  Used to smoke cigarettes.  No alcohol use.   No illicit drug use    FAMILY HISTORY:  No family history available.     ALLERGIES:  No known allergies.    BMP: Date: 2/14/2025 Na 141 K 4.8 Cr 0.91 BUN 15 glucose 74 Ca 8.9 GFR 81  CBC: Date: 2/14/2025 WBC 5.61 Hgb 12.9 hematocrit 38.6 platelets 282  Liver function: Date: 2/14/2025 ALT 15 AST not available  alkaline phos.  98 bilirubin 0.3 albumin 3.1 protein 5.6             Lab Results   Component Value Date    WBC 8.3 02/06/2025    WBC 8.3 02/06/2025    HGB 14.5 02/06/2025    HGB 14.5 02/06/2025    HCT 41.6 02/06/2025    HCT 41.6 02/06/2025     02/06/2025     02/06/2025    ALT 10 02/06/2025    AST 15 02/06/2025     02/06/2025    K 4.4 02/06/2025     02/06/2025    CREATININE 1.03 02/06/2025    BUN 16 02/06/2025    CO2 29 02/06/2025    TSH 3.34 07/19/2022    INR 1.0 03/31/2021             /67   Pulse 85   Temp 36.6 °C (97.8 °F)   Resp 18   Ht 1.778 m (5' 10\")   Wt 81.6 kg (180 lb)   SpO2 96%   BMI 25.83 kg/m²      Physical Exam  Vitals and nursing note reviewed.   Constitutional:       General: He is awake.      Appearance: He is ill-appearing.   HENT:      Head: Normocephalic.      Right Ear: External ear normal.      Left Ear: External ear normal.      Nose: Nose normal.      Mouth/Throat:      Mouth: Mucous membranes are moist.      Pharynx: Oropharynx is clear.   Eyes:      Extraocular Movements: Extraocular movements intact.      Conjunctiva/sclera: Conjunctivae normal.      Pupils: Pupils are equal, round, and reactive to light.   Cardiovascular:      Rate and Rhythm: Normal rate and regular rhythm.      Pulses: Normal pulses.      Heart sounds: Murmur heard.   Pulmonary:      Effort: Pulmonary effort is normal.      Breath sounds: Normal breath sounds.   Abdominal: "      General: Bowel sounds are normal.      Palpations: Abdomen is soft.   Musculoskeletal:         General: Normal range of motion.      Cervical back: Normal range of motion and neck supple.      Comments: Generalized muscle weakness   Skin:     General: Skin is warm and dry.   Neurological:      General: No focal deficit present.      Mental Status: He is alert. Mental status is at baseline. He is confused.      Sensory: Sensation is intact.      Motor: Weakness present.      Coordination: Coordination abnormal.      Gait: Gait abnormal.      Comments: Dementia   Psychiatric:         Mood and Affect: Mood normal. Affect is inappropriate.         Behavior: Behavior is slowed. Behavior is cooperative.         Cognition and Memory: Cognition is impaired. Memory is impaired.         Judgment: Judgment is inappropriate.      Comments: Dementia          Assessment/Plan   CBC and BMP ordered for tomorrow.     Benign prostatic hyperplasia with lower urinary tract symptoms;  Mixed stress and urge urinary incontinence:  Discharged with a angeles.  Angeles removed.  Continue Trospium.   Continue to monitor to make sure patient is voiding.   Follow up with urologist Dr. Martinez.  Monitor for dysuria.    Dementia; Normal pressure hydrocephalus-idiopathic; Memory impairment:  Patient is a poor historian.  Also has normal pressure hydrocephalus.  Continue Buspirone, Remeron and Zyprexa.  Follow up with neurologist Dr Quigley for dementia, normal pressure hydrocephalus and gait disorder.     Depression and generalized anxiety disorder.  Evaluated by psychiatry in the hospital.  Continue Buspirone, Remeron and Zyprexa.    Osteoarthritis; neuropathy:  Continue acetaminophen PRN for OA.  Continue rehab PT/OT at [facility].    Generalized muscle weakness; Gait instability; Recurrent falls:  CT cervical spine, CT head, CXR and XR pelvis were performed in the hospital.  Follow up with Dr. Quigley outpatient for multifactoral gait disorder  with paresthesias.  Continue at  [facility] skilled services PT/OT.   Fall prevention strategies.     GERD:  Continue omeprazole.  Monitor for reflux.     Discharge Planning:  Patient will be transferring to another nursing facility later this week that is closer to their house.  He will be transferring to [facility].               Problem List Items Addressed This Visit       Gait instability    Recurrent falls    Memory impairment    Benign prostatic hyperplasia with lower urinary tract symptoms - Primary    Mixed stress and urge urinary incontinence     Other Visit Diagnoses       Dementia, unspecified dementia severity, unspecified dementia type, unspecified whether behavioral, psychotic, or mood disturbance or anxiety (Multi)        Normal pressure hydrocephalus (Multi)        Major depressive disorder with current active episode, unspecified depression episode severity, unspecified whether recurrent        Generalized anxiety disorder        Osteoarthritis, unspecified osteoarthritis type, unspecified site        Neuropathy        Generalized muscle weakness        Gastroesophageal reflux disease, unspecified whether esophagitis present        Discharge planning issues                  GUILLERMINA Santillan       Electronically Signed By: GUILLERMINA Santillan   2/22/25  5:47 PM

## 2025-02-22 VITALS
WEIGHT: 180 LBS | RESPIRATION RATE: 18 BRPM | HEIGHT: 70 IN | SYSTOLIC BLOOD PRESSURE: 120 MMHG | TEMPERATURE: 97.8 F | DIASTOLIC BLOOD PRESSURE: 67 MMHG | HEART RATE: 85 BPM | OXYGEN SATURATION: 96 % | BODY MASS INDEX: 25.77 KG/M2

## 2025-02-22 NOTE — PROGRESS NOTES
"Name: Enmanuel Bhatia    YOB: 1935    Code Status:  FULL CODE    Chief Complaint:  Follow up on Benign prostatic hyperplasia with lower urinary tract symptoms; etc....       HPI      89 y.o. male with past medical history significant for normal pressure hydrocephalus, BPH, gait instability, GERD, small bowel obstruction, and memory impairment, dementia, depressive.      Patient was admitted to [facility] from 2/3/25 to 2/8/25 (5 days).    Patient admitted to [facility] for skilled services on 2/8/25.    Diagnoses as follows:    Benign prostatic hyperplasia with lower urinary tract symptoms;  Mixed stress and urge urinary incontinence:  Discharged with a angeles.  Orders for void trial with post void residual for 3 days if greater than 300 ml reinsert angeles.  Discharged on Trospium.   Nursing reports patient's Angeles was removed last week.  He has been voiding without any problems.   Patient follows with urologist Dr. Martinez.  No complaints of dysuria.  No costovertebral angle pain.  No fevers.   Patient seen today.   Cooperative but confused.  Follows commands.  No complaints of pain.  No headaches or dizziness.  No chest pain.    Dementia; Normal pressure hydrocephalus-idiopathic; Memory impairment:  Patient is a poor historian.  Also has normal pressure hydrocephalus.  In the hospital daughter reported that that he has \"water on the brain\" that causes him several issues.   He can remember things from the past but difficulty with remember anything recent,   but is able to recognize her and the grandchildren faces and names.   States that patient spends most of his time sitting in the chair watching TV.   He was using a walker at home with difficulty.   He doesn't drive, doesn't manage the finances and needs assistance with dressing.   On Buspirone, Remeron and Zyprexa.  Pt is followed by his neurologist Dr Quigley for dementia, normal pressure hydrocephalus and gait disorder.   Was " seen by Dr Berg in 2019.  No complaints of headaches or dizziness.    Depression and generalized anxiety disorder.  Evaluated by psychiatry in the hospital.  On Buspirone, Remeron and Zyprexa.  Haldol was stopped and Zyprexa was started in the hospital.    Osteoarthritis; neuropathy:  On acetaminophen PRN for OA.  On gabapentin for neuropathy.   No complaints of pain or neuropathy.     Generalized muscle weakness; Gait instability; Recurrent falls:  Patient fell after PT at home.  Wife could not get him up and she called EMS,  CT cervical spine, CT head, CXR and XR pelvis were performed in the hospital.  Follows with Dr. Quigley outpatient for multifactoral gait disorder with paresthesias.  Patient at [facility] for skilled services PT/OT.     GERD:  On omeprazole.  No complaints of reflux.    Discharge Planning:  Patient will be transferring to another nursing facility later this week that is closer to their house.  He will be transferring to [facility].                           Reviewed  EMR  Reviewed medical, social, surgical and family history.  Reviewed all current medications and performed medication reconciliation.  Performed prescription drug management.  Reviewed vital signs AND lab results  Reviewed [facility] Documentation  Discussed patient with nursing.                   ROS: 10 point ROS performed; Negative unless noted in HPI.     Medical History:   Diagnosis Date    Benign prostatic hyperplasia without lower urinary tract symptoms       Enlarged prostate    Gait instability      GERD (gastroesophageal reflux disease)      Hydrocephalus      Memory impairment      Paresthesias      Personal history of other diseases of the musculoskeletal system and connective tissue       History of arthritis    Small bowel obstruction (Multi)      Urinary incontinence       Surgical History:   Procedure Laterality Date    COLON SURGERY        OTHER SURGICAL HISTORY    "07/22/2019     No history of surgery     SOCIAL HISTORY:  Former smoker  Used to smoke cigarettes.  No alcohol use.   No illicit drug use    FAMILY HISTORY:  No family history available.     ALLERGIES:  No known allergies.    BMP: Date: 2/14/2025 Na 141 K 4.8 Cr 0.91 BUN 15 glucose 74 Ca 8.9 GFR 81  CBC: Date: 2/14/2025 WBC 5.61 Hgb 12.9 hematocrit 38.6 platelets 282  Liver function: Date: 2/14/2025 ALT 15 AST not available  alkaline phos.  98 bilirubin 0.3 albumin 3.1 protein 5.6             Lab Results   Component Value Date    WBC 8.3 02/06/2025    WBC 8.3 02/06/2025    HGB 14.5 02/06/2025    HGB 14.5 02/06/2025    HCT 41.6 02/06/2025    HCT 41.6 02/06/2025     02/06/2025     02/06/2025    ALT 10 02/06/2025    AST 15 02/06/2025     02/06/2025    K 4.4 02/06/2025     02/06/2025    CREATININE 1.03 02/06/2025    BUN 16 02/06/2025    CO2 29 02/06/2025    TSH 3.34 07/19/2022    INR 1.0 03/31/2021             /67   Pulse 85   Temp 36.6 °C (97.8 °F)   Resp 18   Ht 1.778 m (5' 10\")   Wt 81.6 kg (180 lb)   SpO2 96%   BMI 25.83 kg/m²      Physical Exam  Vitals and nursing note reviewed.   Constitutional:       General: He is awake.      Appearance: He is ill-appearing.   HENT:      Head: Normocephalic.      Right Ear: External ear normal.      Left Ear: External ear normal.      Nose: Nose normal.      Mouth/Throat:      Mouth: Mucous membranes are moist.      Pharynx: Oropharynx is clear.   Eyes:      Extraocular Movements: Extraocular movements intact.      Conjunctiva/sclera: Conjunctivae normal.      Pupils: Pupils are equal, round, and reactive to light.   Cardiovascular:      Rate and Rhythm: Normal rate and regular rhythm.      Pulses: Normal pulses.      Heart sounds: Murmur heard.   Pulmonary:      Effort: Pulmonary effort is normal.      Breath sounds: Normal breath sounds.   Abdominal:      General: Bowel sounds are normal.      Palpations: Abdomen is soft. "   Musculoskeletal:         General: Normal range of motion.      Cervical back: Normal range of motion and neck supple.      Comments: Generalized muscle weakness   Skin:     General: Skin is warm and dry.   Neurological:      General: No focal deficit present.      Mental Status: He is alert. Mental status is at baseline. He is confused.      Sensory: Sensation is intact.      Motor: Weakness present.      Coordination: Coordination abnormal.      Gait: Gait abnormal.      Comments: Dementia   Psychiatric:         Mood and Affect: Mood normal. Affect is inappropriate.         Behavior: Behavior is slowed. Behavior is cooperative.         Cognition and Memory: Cognition is impaired. Memory is impaired.         Judgment: Judgment is inappropriate.      Comments: Dementia          Assessment/Plan    CBC and BMP ordered for tomorrow.     Benign prostatic hyperplasia with lower urinary tract symptoms;  Mixed stress and urge urinary incontinence:  Discharged with a angeles.  Angeles removed.  Continue Trospium.   Continue to monitor to make sure patient is voiding.   Follow up with urologist Dr. Martinez.  Monitor for dysuria.    Dementia; Normal pressure hydrocephalus-idiopathic; Memory impairment:  Patient is a poor historian.  Also has normal pressure hydrocephalus.  Continue Buspirone, Remeron and Zyprexa.  Follow up with neurologist Dr Quigley for dementia, normal pressure hydrocephalus and gait disorder.     Depression and generalized anxiety disorder.  Evaluated by psychiatry in the hospital.  Continue Buspirone, Remeron and Zyprexa.    Osteoarthritis; neuropathy:  Continue acetaminophen PRN for OA.  Continue rehab PT[facility]OT at Health system.    Generalized muscle weakness; Gait instability; Recurrent falls:  CT cervical spine, CT head, CXR and XR pelvis were performed in the hospital.  Follow up with Dr. Quigley outpatient for multifactoral gait disorder with paresthesias.  Continue at  [facility] for skilled  services PT/OT.   Fall prevention strategies.     GERD:  Continue omeprazole.  Monitor for reflux.     Discharge Planning:  Patient will be transferring to another nursing facility later this week that is closer to their house.  He will be transferring to [facility].               Problem List Items Addressed This Visit       Gait instability    Recurrent falls    Memory impairment    Benign prostatic hyperplasia with lower urinary tract symptoms - Primary    Mixed stress and urge urinary incontinence     Other Visit Diagnoses       Dementia, unspecified dementia severity, unspecified dementia type, unspecified whether behavioral, psychotic, or mood disturbance or anxiety (Multi)        Normal pressure hydrocephalus (Multi)        Major depressive disorder with current active episode, unspecified depression episode severity, unspecified whether recurrent        Generalized anxiety disorder        Osteoarthritis, unspecified osteoarthritis type, unspecified site        Neuropathy        Generalized muscle weakness        Gastroesophageal reflux disease, unspecified whether esophagitis present        Discharge planning issues                  Shell Brewer, APRN-CNP

## 2025-02-23 ENCOUNTER — NURSING HOME VISIT (OUTPATIENT)
Dept: POST ACUTE CARE | Facility: EXTERNAL LOCATION | Age: OVER 89
End: 2025-02-23
Payer: MEDICARE

## 2025-02-23 DIAGNOSIS — R53.1 WEAKNESS: ICD-10-CM

## 2025-02-23 DIAGNOSIS — R26.81 GAIT INSTABILITY: Primary | ICD-10-CM

## 2025-02-23 DIAGNOSIS — R32 URINARY INCONTINENCE, UNSPECIFIED TYPE: ICD-10-CM

## 2025-02-23 DIAGNOSIS — F32.A DEPRESSION, UNSPECIFIED DEPRESSION TYPE: ICD-10-CM

## 2025-02-23 DIAGNOSIS — R35.0 BENIGN PROSTATIC HYPERPLASIA WITH URINARY FREQUENCY: ICD-10-CM

## 2025-02-23 DIAGNOSIS — Z91.81 AT RISK FOR FALLING: ICD-10-CM

## 2025-02-23 DIAGNOSIS — R41.89 COGNITIVE DECLINE: ICD-10-CM

## 2025-02-23 DIAGNOSIS — N40.1 BENIGN PROSTATIC HYPERPLASIA WITH URINARY FREQUENCY: ICD-10-CM

## 2025-02-23 PROCEDURE — 99305 1ST NF CARE MODERATE MDM 35: CPT | Performed by: INTERNAL MEDICINE

## 2025-02-23 NOTE — LETTER
Patient: Enmanuel Bhatia  : 1935    Encounter Date: 2025    PLACE OF SERVICE:  [facility]    This is new/initial history and physical.    Subjective  Patient ID: Enmanuel Bhatia is a 89 y.o. male who presents for Annual Exam and new/initial history .    Mr. Enmanuel Bhatia is an 89-year-old male with recent fall with gait instability.  He also suffers from cognitive decline.  He is unable to take care for himself.  He requires supportive care.    Review of Systems   Constitutional:  Negative for chills and fever.   Cardiovascular:  Negative for chest pain.   All other systems reviewed and are negative.    Objective  /76   Pulse 76   Temp 36.6 °C (97.8 °F)   Resp 16     Physical Exam  Vitals reviewed.   Constitutional:       General: He is not in acute distress.     Comments: This is a well-developed, well-nourished male, sitting in a chair   HENT:      Right Ear: Tympanic membrane, ear canal and external ear normal.      Left Ear: Tympanic membrane, ear canal and external ear normal.   Eyes:      General: No scleral icterus.     Pupils: Pupils are equal, round, and reactive to light.   Neck:      Vascular: No carotid bruit.   Cardiovascular:      Heart sounds: Normal heart sounds, S1 normal and S2 normal. No murmur heard.     No friction rub.   Pulmonary:      Effort: Pulmonary effort is normal.      Breath sounds: Normal breath sounds and air entry.   Abdominal:      Palpations: There is no hepatomegaly, splenomegaly or mass.   Musculoskeletal:         General: No swelling or deformity. Normal range of motion.      Cervical back: Neck supple.      Right lower leg: No edema.      Left lower leg: No edema.   Lymphadenopathy:      Cervical: No cervical adenopathy.      Upper Body:      Right upper body: No axillary adenopathy.      Left upper body: No axillary adenopathy.      Lower Body: No right inguinal adenopathy. No left inguinal adenopathy.   Neurological:      Mental Status: He is  oriented to person, place, and time.      Cranial Nerves: Cranial nerves 2-12 are intact. No cranial nerve deficit.      Sensory: No sensory deficit.      Motor: Motor function is intact. No weakness.      Gait: Gait is intact.      Deep Tendon Reflexes: Reflexes normal.   Psychiatric:         Mood and Affect: Mood normal. Mood is not anxious or depressed. Affect is not angry.         Behavior: Behavior is not agitated.         Thought Content: Thought content normal.         Judgment: Judgment normal.     LAB WORK:  Laboratory studies were reviewed.    Assessment/Plan  Problem List Items Addressed This Visit             ICD-10-CM    Gait instability - Primary R26.81    Benign prostatic hyperplasia with lower urinary tract symptoms N40.1     Other Visit Diagnoses         Codes    Cognitive decline     R41.89    Urinary incontinence, unspecified type     R32    Depression, unspecified depression type     F32.A    Weakness     R53.1    At risk for falling     Z91.81        1. Gait instability, on physical therapy.  2. Cognitive decline, on supportive care.  3. BPH, on tamsulosin.  4. Urinary incontinence.  Follow with Urology.  5. Depression, on buspirone.  6. Weakness, on PT/OT.  7. Fall risk, on fall precautions.    Scribe Attestation  By signing my name below, ISimin Scribe attest that this documentation has been prepared under the direction and in the presence of Alberto Muñoz MD.     All medical record entries made by the scribe were personally dictated by me I have reviewed the chart and agree the record accurately reflects my personal performance of his history physical examination and management      Electronically Signed By: Alberto Muñoz MD   2/28/25  9:59 PM

## 2025-02-27 VITALS
HEART RATE: 76 BPM | DIASTOLIC BLOOD PRESSURE: 76 MMHG | SYSTOLIC BLOOD PRESSURE: 126 MMHG | TEMPERATURE: 97.8 F | RESPIRATION RATE: 16 BRPM

## 2025-02-27 ASSESSMENT — ENCOUNTER SYMPTOMS
FEVER: 0
CHILLS: 0

## 2025-02-27 NOTE — PROGRESS NOTES
PLACE OF SERVICE:  [facility]    This is new/initial history and physical.    Subjective   Patient ID: Enmanuel Bhatia is a 89 y.o. male who presents for Annual Exam and new/initial history .    Mr. Enmanuel Bhatia is an 89-year-old male with recent fall with gait instability.  He also suffers from cognitive decline.  He is unable to take care for himself.  He requires supportive care.    Review of Systems   Constitutional:  Negative for chills and fever.   Cardiovascular:  Negative for chest pain.   All other systems reviewed and are negative.    Objective   /76   Pulse 76   Temp 36.6 °C (97.8 °F)   Resp 16     Physical Exam  Vitals reviewed.   Constitutional:       General: He is not in acute distress.     Comments: This is a well-developed, well-nourished male, sitting in a chair   HENT:      Right Ear: Tympanic membrane, ear canal and external ear normal.      Left Ear: Tympanic membrane, ear canal and external ear normal.   Eyes:      General: No scleral icterus.     Pupils: Pupils are equal, round, and reactive to light.   Neck:      Vascular: No carotid bruit.   Cardiovascular:      Heart sounds: Normal heart sounds, S1 normal and S2 normal. No murmur heard.     No friction rub.   Pulmonary:      Effort: Pulmonary effort is normal.      Breath sounds: Normal breath sounds and air entry.   Abdominal:      Palpations: There is no hepatomegaly, splenomegaly or mass.   Musculoskeletal:         General: No swelling or deformity. Normal range of motion.      Cervical back: Neck supple.      Right lower leg: No edema.      Left lower leg: No edema.   Lymphadenopathy:      Cervical: No cervical adenopathy.      Upper Body:      Right upper body: No axillary adenopathy.      Left upper body: No axillary adenopathy.      Lower Body: No right inguinal adenopathy. No left inguinal adenopathy.   Neurological:      Mental Status: He is oriented to person, place, and time.      Cranial Nerves: Cranial nerves  2-12 are intact. No cranial nerve deficit.      Sensory: No sensory deficit.      Motor: Motor function is intact. No weakness.      Gait: Gait is intact.      Deep Tendon Reflexes: Reflexes normal.   Psychiatric:         Mood and Affect: Mood normal. Mood is not anxious or depressed. Affect is not angry.         Behavior: Behavior is not agitated.         Thought Content: Thought content normal.         Judgment: Judgment normal.     LAB WORK:  Laboratory studies were reviewed.    Assessment/Plan   Problem List Items Addressed This Visit             ICD-10-CM    Gait instability - Primary R26.81    Benign prostatic hyperplasia with lower urinary tract symptoms N40.1     Other Visit Diagnoses         Codes    Cognitive decline     R41.89    Urinary incontinence, unspecified type     R32    Depression, unspecified depression type     F32.A    Weakness     R53.1    At risk for falling     Z91.81        1. Gait instability, on physical therapy.  2. Cognitive decline, on supportive care.  3. BPH, on tamsulosin.  4. Urinary incontinence.  Follow with Urology.  5. Depression, on buspirone.  6. Weakness, on PT/OT.  7. Fall risk, on fall precautions.    Scribe Attestation  By signing my name below, ISimin Scribe attest that this documentation has been prepared under the direction and in the presence of Alberto Muñoz MD.     All medical record entries made by the anayaibdo were personally dictated by me I have reviewed the chart and agree the record accurately reflects my personal performance of his history physical examination and management

## 2025-03-03 ENCOUNTER — HOSPITAL ENCOUNTER (OUTPATIENT)
Facility: HOSPITAL | Age: OVER 89
Setting detail: OBSERVATION
Discharge: HOME | End: 2025-03-06
Attending: STUDENT IN AN ORGANIZED HEALTH CARE EDUCATION/TRAINING PROGRAM | Admitting: INTERNAL MEDICINE
Payer: MEDICARE

## 2025-03-03 ENCOUNTER — APPOINTMENT (OUTPATIENT)
Dept: RADIOLOGY | Facility: HOSPITAL | Age: OVER 89
End: 2025-03-03
Payer: MEDICARE

## 2025-03-03 DIAGNOSIS — G91.2 NPH (NORMAL PRESSURE HYDROCEPHALUS) (MULTI): ICD-10-CM

## 2025-03-03 DIAGNOSIS — R53.1 GENERALIZED WEAKNESS: ICD-10-CM

## 2025-03-03 DIAGNOSIS — R29.6 FREQUENT FALLS: ICD-10-CM

## 2025-03-03 DIAGNOSIS — N30.01 ACUTE CYSTITIS WITH HEMATURIA: Primary | ICD-10-CM

## 2025-03-03 DIAGNOSIS — R41.3 MEMORY IMPAIRMENT: ICD-10-CM

## 2025-03-03 DIAGNOSIS — R26.81 GAIT INSTABILITY: ICD-10-CM

## 2025-03-03 PROBLEM — N30.00 ACUTE CYSTITIS WITHOUT HEMATURIA: Status: ACTIVE | Noted: 2025-03-03

## 2025-03-03 LAB
ALBUMIN SERPL BCP-MCNC: 3.3 G/DL (ref 3.4–5)
ALP SERPL-CCNC: 78 U/L (ref 33–136)
ALT SERPL W P-5'-P-CCNC: 20 U/L (ref 10–52)
ANION GAP SERPL CALCULATED.3IONS-SCNC: 8 MMOL/L (ref 10–20)
APPEARANCE UR: ABNORMAL
APTT PPP: 25.8 SECONDS (ref 22–32.5)
AST SERPL W P-5'-P-CCNC: 21 U/L (ref 9–39)
BACTERIA #/AREA URNS AUTO: ABNORMAL /HPF
BASOPHILS # BLD AUTO: 0.05 X10*3/UL (ref 0–0.1)
BASOPHILS NFR BLD AUTO: 0.7 %
BILIRUB SERPL-MCNC: 0.5 MG/DL (ref 0–1.2)
BILIRUB UR STRIP.AUTO-MCNC: NEGATIVE MG/DL
BNP SERPL-MCNC: 106 PG/ML (ref 0–99)
BUN SERPL-MCNC: 10 MG/DL (ref 6–23)
CALCIUM SERPL-MCNC: 8.7 MG/DL (ref 8.6–10.3)
CARDIAC TROPONIN I PNL SERPL HS: 11 NG/L (ref 0–20)
CARDIAC TROPONIN I PNL SERPL HS: 11 NG/L (ref 0–20)
CHLORIDE SERPL-SCNC: 106 MMOL/L (ref 98–107)
CO2 SERPL-SCNC: 28 MMOL/L (ref 21–32)
COLOR UR: ABNORMAL
CREAT SERPL-MCNC: 0.84 MG/DL (ref 0.5–1.3)
EGFRCR SERPLBLD CKD-EPI 2021: 83 ML/MIN/1.73M*2
EOSINOPHIL # BLD AUTO: 0.29 X10*3/UL (ref 0–0.4)
EOSINOPHIL NFR BLD AUTO: 4.1 %
ERYTHROCYTE [DISTWIDTH] IN BLOOD BY AUTOMATED COUNT: 13.2 % (ref 11.5–14.5)
FLUAV RNA RESP QL NAA+PROBE: NOT DETECTED
FLUBV RNA RESP QL NAA+PROBE: NOT DETECTED
GLUCOSE SERPL-MCNC: 90 MG/DL (ref 74–99)
GLUCOSE UR STRIP.AUTO-MCNC: NORMAL MG/DL
HCT VFR BLD AUTO: 36.1 % (ref 41–52)
HGB BLD-MCNC: 12.3 G/DL (ref 13.5–17.5)
HOLD SPECIMEN: NORMAL
IMM GRANULOCYTES # BLD AUTO: 0.05 X10*3/UL (ref 0–0.5)
IMM GRANULOCYTES NFR BLD AUTO: 0.7 % (ref 0–0.9)
INR PPP: 1 (ref 0.9–1.2)
KETONES UR STRIP.AUTO-MCNC: NEGATIVE MG/DL
LEUKOCYTE ESTERASE UR QL STRIP.AUTO: ABNORMAL
LIPASE SERPL-CCNC: 32 U/L (ref 9–82)
LYMPHOCYTES # BLD AUTO: 1.39 X10*3/UL (ref 0.8–3)
LYMPHOCYTES NFR BLD AUTO: 19.9 %
MAGNESIUM SERPL-MCNC: 2.1 MG/DL (ref 1.6–2.4)
MCH RBC QN AUTO: 31.9 PG (ref 26–34)
MCHC RBC AUTO-ENTMCNC: 34.1 G/DL (ref 32–36)
MCV RBC AUTO: 94 FL (ref 80–100)
MONOCYTES # BLD AUTO: 0.66 X10*3/UL (ref 0.05–0.8)
MONOCYTES NFR BLD AUTO: 9.4 %
NEUTROPHILS # BLD AUTO: 4.56 X10*3/UL (ref 1.6–5.5)
NEUTROPHILS NFR BLD AUTO: 65.2 %
NITRITE UR QL STRIP.AUTO: NEGATIVE
NRBC BLD-RTO: 0 /100 WBCS (ref 0–0)
PH UR STRIP.AUTO: 6.5 [PH]
PLATELET # BLD AUTO: 271 X10*3/UL (ref 150–450)
POTASSIUM SERPL-SCNC: 4 MMOL/L (ref 3.5–5.3)
PROT SERPL-MCNC: 5.1 G/DL (ref 6.4–8.2)
PROT UR STRIP.AUTO-MCNC: NEGATIVE MG/DL
PROTHROMBIN TIME: 10.7 SECONDS (ref 9.3–12.7)
RBC # BLD AUTO: 3.85 X10*6/UL (ref 4.5–5.9)
RBC # UR STRIP.AUTO: ABNORMAL MG/DL
RBC #/AREA URNS AUTO: ABNORMAL /HPF
RSV RNA RESP QL NAA+PROBE: NOT DETECTED
SARS-COV-2 RNA RESP QL NAA+PROBE: NOT DETECTED
SODIUM SERPL-SCNC: 138 MMOL/L (ref 136–145)
SP GR UR STRIP.AUTO: 1.01
UROBILINOGEN UR STRIP.AUTO-MCNC: NORMAL MG/DL
WBC # BLD AUTO: 7 X10*3/UL (ref 4.4–11.3)
WBC #/AREA URNS AUTO: ABNORMAL /HPF

## 2025-03-03 PROCEDURE — 81001 URINALYSIS AUTO W/SCOPE: CPT

## 2025-03-03 PROCEDURE — 2500000001 HC RX 250 WO HCPCS SELF ADMINISTERED DRUGS (ALT 637 FOR MEDICARE OP): Performed by: INTERNAL MEDICINE

## 2025-03-03 PROCEDURE — G0378 HOSPITAL OBSERVATION PER HR: HCPCS

## 2025-03-03 PROCEDURE — 2500000002 HC RX 250 W HCPCS SELF ADMINISTERED DRUGS (ALT 637 FOR MEDICARE OP, ALT 636 FOR OP/ED): Performed by: INTERNAL MEDICINE

## 2025-03-03 PROCEDURE — 71045 X-RAY EXAM CHEST 1 VIEW: CPT

## 2025-03-03 PROCEDURE — 83735 ASSAY OF MAGNESIUM: CPT

## 2025-03-03 PROCEDURE — 80053 COMPREHEN METABOLIC PANEL: CPT

## 2025-03-03 PROCEDURE — 84484 ASSAY OF TROPONIN QUANT: CPT

## 2025-03-03 PROCEDURE — 99222 1ST HOSP IP/OBS MODERATE 55: CPT | Performed by: INTERNAL MEDICINE

## 2025-03-03 PROCEDURE — 83690 ASSAY OF LIPASE: CPT

## 2025-03-03 PROCEDURE — 99285 EMERGENCY DEPT VISIT HI MDM: CPT | Mod: 25 | Performed by: STUDENT IN AN ORGANIZED HEALTH CARE EDUCATION/TRAINING PROGRAM

## 2025-03-03 PROCEDURE — 2500000004 HC RX 250 GENERAL PHARMACY W/ HCPCS (ALT 636 FOR OP/ED): Performed by: INTERNAL MEDICINE

## 2025-03-03 PROCEDURE — 36415 COLL VENOUS BLD VENIPUNCTURE: CPT

## 2025-03-03 PROCEDURE — 87086 URINE CULTURE/COLONY COUNT: CPT | Mod: WESLAB

## 2025-03-03 PROCEDURE — 96372 THER/PROPH/DIAG INJ SC/IM: CPT | Performed by: INTERNAL MEDICINE

## 2025-03-03 PROCEDURE — 87637 SARSCOV2&INF A&B&RSV AMP PRB: CPT

## 2025-03-03 PROCEDURE — 72170 X-RAY EXAM OF PELVIS: CPT

## 2025-03-03 PROCEDURE — 2500000004 HC RX 250 GENERAL PHARMACY W/ HCPCS (ALT 636 FOR OP/ED)

## 2025-03-03 PROCEDURE — 96365 THER/PROPH/DIAG IV INF INIT: CPT

## 2025-03-03 PROCEDURE — 2500000005 HC RX 250 GENERAL PHARMACY W/O HCPCS: Performed by: INTERNAL MEDICINE

## 2025-03-03 PROCEDURE — 83880 ASSAY OF NATRIURETIC PEPTIDE: CPT

## 2025-03-03 PROCEDURE — 85610 PROTHROMBIN TIME: CPT

## 2025-03-03 PROCEDURE — 85025 COMPLETE CBC W/AUTO DIFF WBC: CPT

## 2025-03-03 PROCEDURE — 70450 CT HEAD/BRAIN W/O DYE: CPT

## 2025-03-03 PROCEDURE — 72125 CT NECK SPINE W/O DYE: CPT

## 2025-03-03 RX ORDER — CHOLECALCIFEROL (VITAMIN D3) 50 MCG
2000 TABLET ORAL DAILY
Status: DISCONTINUED | OUTPATIENT
Start: 2025-03-03 | End: 2025-03-06 | Stop reason: HOSPADM

## 2025-03-03 RX ORDER — DEXTROSE MONOHYDRATE, SODIUM CHLORIDE, AND POTASSIUM CHLORIDE 50; 1.49; 4.5 G/1000ML; G/1000ML; G/1000ML
75 INJECTION, SOLUTION INTRAVENOUS CONTINUOUS
Status: DISCONTINUED | OUTPATIENT
Start: 2025-03-03 | End: 2025-03-06 | Stop reason: HOSPADM

## 2025-03-03 RX ORDER — OLANZAPINE 5 MG/1
5 TABLET, ORALLY DISINTEGRATING ORAL NIGHTLY
Status: DISCONTINUED | OUTPATIENT
Start: 2025-03-03 | End: 2025-03-06 | Stop reason: HOSPADM

## 2025-03-03 RX ORDER — FLUTICASONE PROPIONATE 50 MCG
1 SPRAY, SUSPENSION (ML) NASAL DAILY
Status: DISCONTINUED | OUTPATIENT
Start: 2025-03-04 | End: 2025-03-06 | Stop reason: HOSPADM

## 2025-03-03 RX ORDER — ENOXAPARIN SODIUM 100 MG/ML
40 INJECTION SUBCUTANEOUS DAILY
Status: DISCONTINUED | OUTPATIENT
Start: 2025-03-03 | End: 2025-03-06 | Stop reason: HOSPADM

## 2025-03-03 RX ORDER — OXYBUTYNIN CHLORIDE 5 MG/1
5 TABLET, EXTENDED RELEASE ORAL DAILY
COMMUNITY

## 2025-03-03 RX ORDER — NYSTATIN 100000 U/G
1 CREAM TOPICAL 2 TIMES DAILY
Status: DISCONTINUED | OUTPATIENT
Start: 2025-03-03 | End: 2025-03-06 | Stop reason: HOSPADM

## 2025-03-03 RX ORDER — GUAIFENESIN/DEXTROMETHORPHAN 100-10MG/5
5 SYRUP ORAL EVERY 4 HOURS PRN
Status: DISCONTINUED | OUTPATIENT
Start: 2025-03-03 | End: 2025-03-06 | Stop reason: HOSPADM

## 2025-03-03 RX ORDER — GABAPENTIN 300 MG/1
300 CAPSULE ORAL NIGHTLY
Status: DISCONTINUED | OUTPATIENT
Start: 2025-03-03 | End: 2025-03-06 | Stop reason: HOSPADM

## 2025-03-03 RX ORDER — MIRTAZAPINE 15 MG/1
15 TABLET, FILM COATED ORAL NIGHTLY
Status: DISCONTINUED | OUTPATIENT
Start: 2025-03-03 | End: 2025-03-06 | Stop reason: HOSPADM

## 2025-03-03 RX ORDER — OLANZAPINE 2.5 MG/1
2.5 TABLET ORAL EVERY 8 HOURS PRN
Status: DISCONTINUED | OUTPATIENT
Start: 2025-03-03 | End: 2025-03-04

## 2025-03-03 RX ORDER — GUAIFENESIN 600 MG/1
600 TABLET, EXTENDED RELEASE ORAL EVERY 12 HOURS PRN
Status: DISCONTINUED | OUTPATIENT
Start: 2025-03-03 | End: 2025-03-06 | Stop reason: HOSPADM

## 2025-03-03 RX ORDER — CEFTRIAXONE 2 G/50ML
2 INJECTION, SOLUTION INTRAVENOUS ONCE
Status: COMPLETED | OUTPATIENT
Start: 2025-03-03 | End: 2025-03-03

## 2025-03-03 RX ORDER — ONDANSETRON 4 MG/1
4 TABLET, ORALLY DISINTEGRATING ORAL EVERY 8 HOURS PRN
Status: DISCONTINUED | OUTPATIENT
Start: 2025-03-03 | End: 2025-03-06 | Stop reason: HOSPADM

## 2025-03-03 RX ORDER — TALC
3 POWDER (GRAM) TOPICAL NIGHTLY PRN
Status: DISCONTINUED | OUTPATIENT
Start: 2025-03-03 | End: 2025-03-04

## 2025-03-03 RX ORDER — ACETAMINOPHEN 650 MG/1
650 SUPPOSITORY RECTAL EVERY 4 HOURS PRN
Status: DISCONTINUED | OUTPATIENT
Start: 2025-03-03 | End: 2025-03-06 | Stop reason: HOSPADM

## 2025-03-03 RX ORDER — ACETAMINOPHEN 325 MG/1
650 TABLET ORAL EVERY 4 HOURS PRN
Status: DISCONTINUED | OUTPATIENT
Start: 2025-03-03 | End: 2025-03-06 | Stop reason: HOSPADM

## 2025-03-03 RX ORDER — CEFTRIAXONE 1 G/50ML
1 INJECTION, SOLUTION INTRAVENOUS EVERY 24 HOURS
Status: DISCONTINUED | OUTPATIENT
Start: 2025-03-04 | End: 2025-03-05

## 2025-03-03 RX ORDER — ACETAMINOPHEN 160 MG/5ML
650 SOLUTION ORAL EVERY 4 HOURS PRN
Status: DISCONTINUED | OUTPATIENT
Start: 2025-03-03 | End: 2025-03-06 | Stop reason: HOSPADM

## 2025-03-03 RX ORDER — OMEPRAZOLE 20 MG/1
20 CAPSULE, DELAYED RELEASE ORAL
COMMUNITY

## 2025-03-03 RX ORDER — PANTOPRAZOLE SODIUM 40 MG/1
40 TABLET, DELAYED RELEASE ORAL DAILY
Status: DISCONTINUED | OUTPATIENT
Start: 2025-03-03 | End: 2025-03-06 | Stop reason: HOSPADM

## 2025-03-03 RX ORDER — OXYBUTYNIN CHLORIDE 5 MG/1
5 TABLET ORAL 3 TIMES DAILY
Status: DISCONTINUED | OUTPATIENT
Start: 2025-03-03 | End: 2025-03-06 | Stop reason: HOSPADM

## 2025-03-03 RX ORDER — DOCUSATE SODIUM 100 MG/1
100 CAPSULE, LIQUID FILLED ORAL 2 TIMES DAILY
Status: DISCONTINUED | OUTPATIENT
Start: 2025-03-03 | End: 2025-03-06 | Stop reason: HOSPADM

## 2025-03-03 RX ORDER — POLYETHYLENE GLYCOL 3350 17 G/17G
17 POWDER, FOR SOLUTION ORAL DAILY
Status: DISCONTINUED | OUTPATIENT
Start: 2025-03-03 | End: 2025-03-06 | Stop reason: HOSPADM

## 2025-03-03 RX ORDER — BUSPIRONE HYDROCHLORIDE 5 MG/1
7.5 TABLET ORAL 2 TIMES DAILY
Status: DISCONTINUED | OUTPATIENT
Start: 2025-03-03 | End: 2025-03-06 | Stop reason: HOSPADM

## 2025-03-03 RX ORDER — BISACODYL 5 MG
10 TABLET, DELAYED RELEASE (ENTERIC COATED) ORAL DAILY PRN
Status: DISCONTINUED | OUTPATIENT
Start: 2025-03-03 | End: 2025-03-06 | Stop reason: HOSPADM

## 2025-03-03 RX ADMIN — PANTOPRAZOLE SODIUM 40 MG: 20 TABLET, DELAYED RELEASE ORAL at 17:16

## 2025-03-03 RX ADMIN — OLANZAPINE 5 MG: 5 TABLET, ORALLY DISINTEGRATING ORAL at 21:11

## 2025-03-03 RX ADMIN — Medication 2000 UNITS: at 17:16

## 2025-03-03 RX ADMIN — ENOXAPARIN SODIUM 40 MG: 40 INJECTION SUBCUTANEOUS at 17:16

## 2025-03-03 RX ADMIN — Medication 3 MG: at 21:11

## 2025-03-03 RX ADMIN — DEXTROSE MONOHYDRATE, SODIUM CHLORIDE, AND POTASSIUM CHLORIDE 75 ML/HR: 50; 4.5; 1.49 INJECTION, SOLUTION INTRAVENOUS at 17:16

## 2025-03-03 RX ADMIN — OXYBUTYNIN CHLORIDE 5 MG: 5 TABLET ORAL at 21:11

## 2025-03-03 RX ADMIN — GABAPENTIN 300 MG: 300 CAPSULE ORAL at 21:11

## 2025-03-03 RX ADMIN — CEFTRIAXONE SODIUM 2 G: 2 INJECTION, SOLUTION INTRAVENOUS at 12:37

## 2025-03-03 RX ADMIN — DOCUSATE SODIUM 100 MG: 100 CAPSULE, LIQUID FILLED ORAL at 21:11

## 2025-03-03 RX ADMIN — BUSPIRONE HYDROCHLORIDE 7.5 MG: 5 TABLET ORAL at 21:11

## 2025-03-03 RX ADMIN — MIRTAZAPINE 15 MG: 15 TABLET, FILM COATED ORAL at 21:11

## 2025-03-03 SDOH — SOCIAL STABILITY: SOCIAL INSECURITY: ARE YOU MARRIED, WIDOWED, DIVORCED, SEPARATED, NEVER MARRIED, OR LIVING WITH A PARTNER?: MARRIED

## 2025-03-03 SDOH — SOCIAL STABILITY: SOCIAL INSECURITY: HAS ANYONE EVER THREATENED TO HURT YOUR FAMILY OR YOUR PETS?: NO

## 2025-03-03 SDOH — ECONOMIC STABILITY: FOOD INSECURITY: WITHIN THE PAST 12 MONTHS, THE FOOD YOU BOUGHT JUST DIDN'T LAST AND YOU DIDN'T HAVE MONEY TO GET MORE.: PATIENT DECLINED

## 2025-03-03 SDOH — HEALTH STABILITY: MENTAL HEALTH: HOW OFTEN DO YOU HAVE A DRINK CONTAINING ALCOHOL?: NEVER

## 2025-03-03 SDOH — SOCIAL STABILITY: SOCIAL INSECURITY: HAVE YOU HAD THOUGHTS OF HARMING ANYONE ELSE?: NO

## 2025-03-03 SDOH — ECONOMIC STABILITY: INCOME INSECURITY: IN THE PAST 12 MONTHS HAS THE ELECTRIC, GAS, OIL, OR WATER COMPANY THREATENED TO SHUT OFF SERVICES IN YOUR HOME?: NO

## 2025-03-03 SDOH — SOCIAL STABILITY: SOCIAL INSECURITY: DO YOU FEEL UNSAFE GOING BACK TO THE PLACE WHERE YOU ARE LIVING?: NO

## 2025-03-03 SDOH — SOCIAL STABILITY: SOCIAL NETWORK
IN A TYPICAL WEEK, HOW MANY TIMES DO YOU TALK ON THE PHONE WITH FAMILY, FRIENDS, OR NEIGHBORS?: MORE THAN THREE TIMES A WEEK

## 2025-03-03 SDOH — HEALTH STABILITY: MENTAL HEALTH: HOW MANY DRINKS CONTAINING ALCOHOL DO YOU HAVE ON A TYPICAL DAY WHEN YOU ARE DRINKING?: PATIENT DOES NOT DRINK

## 2025-03-03 SDOH — HEALTH STABILITY: PHYSICAL HEALTH
HOW OFTEN DO YOU NEED TO HAVE SOMEONE HELP YOU WHEN YOU READ INSTRUCTIONS, PAMPHLETS, OR OTHER WRITTEN MATERIAL FROM YOUR DOCTOR OR PHARMACY?: OFTEN

## 2025-03-03 SDOH — ECONOMIC STABILITY: TRANSPORTATION INSECURITY
IN THE PAST 12 MONTHS, HAS LACK OF TRANSPORTATION KEPT YOU FROM MEDICAL APPOINTMENTS OR FROM GETTING MEDICATIONS?: PATIENT DECLINED

## 2025-03-03 SDOH — ECONOMIC STABILITY: HOUSING INSECURITY: IN THE PAST 12 MONTHS, HOW MANY TIMES HAVE YOU MOVED WHERE YOU WERE LIVING?: 0

## 2025-03-03 SDOH — SOCIAL STABILITY: SOCIAL INSECURITY: WITHIN THE LAST YEAR, HAVE YOU BEEN HUMILIATED OR EMOTIONALLY ABUSED IN OTHER WAYS BY YOUR PARTNER OR EX-PARTNER?: NO

## 2025-03-03 SDOH — SOCIAL STABILITY: SOCIAL INSECURITY: ABUSE: ADULT

## 2025-03-03 SDOH — ECONOMIC STABILITY: HOUSING INSECURITY: IN THE LAST 12 MONTHS, WAS THERE A TIME WHEN YOU WERE NOT ABLE TO PAY THE MORTGAGE OR RENT ON TIME?: PATIENT DECLINED

## 2025-03-03 SDOH — HEALTH STABILITY: MENTAL HEALTH
DO YOU FEEL STRESS - TENSE, RESTLESS, NERVOUS, OR ANXIOUS, OR UNABLE TO SLEEP AT NIGHT BECAUSE YOUR MIND IS TROUBLED ALL THE TIME - THESE DAYS?: PATIENT DECLINED

## 2025-03-03 SDOH — SOCIAL STABILITY: SOCIAL INSECURITY: DO YOU FEEL ANYONE HAS EXPLOITED OR TAKEN ADVANTAGE OF YOU FINANCIALLY OR OF YOUR PERSONAL PROPERTY?: NO

## 2025-03-03 SDOH — SOCIAL STABILITY: SOCIAL INSECURITY: DOES ANYONE TRY TO KEEP YOU FROM HAVING/CONTACTING OTHER FRIENDS OR DOING THINGS OUTSIDE YOUR HOME?: NO

## 2025-03-03 SDOH — HEALTH STABILITY: PHYSICAL HEALTH: ON AVERAGE, HOW MANY DAYS PER WEEK DO YOU ENGAGE IN MODERATE TO STRENUOUS EXERCISE (LIKE A BRISK WALK)?: 0 DAYS

## 2025-03-03 SDOH — ECONOMIC STABILITY: HOUSING INSECURITY: AT ANY TIME IN THE PAST 12 MONTHS, WERE YOU HOMELESS OR LIVING IN A SHELTER (INCLUDING NOW)?: PATIENT DECLINED

## 2025-03-03 SDOH — SOCIAL STABILITY: SOCIAL NETWORK: HOW OFTEN DO YOU ATTEND MEETINGS OF THE CLUBS OR ORGANIZATIONS YOU BELONG TO?: NEVER

## 2025-03-03 SDOH — ECONOMIC STABILITY: FOOD INSECURITY: HOW HARD IS IT FOR YOU TO PAY FOR THE VERY BASICS LIKE FOOD, HOUSING, MEDICAL CARE, AND HEATING?: PATIENT DECLINED

## 2025-03-03 SDOH — SOCIAL STABILITY: SOCIAL INSECURITY: WITHIN THE LAST YEAR, HAVE YOU BEEN AFRAID OF YOUR PARTNER OR EX-PARTNER?: NO

## 2025-03-03 SDOH — SOCIAL STABILITY: SOCIAL INSECURITY: HAVE YOU HAD ANY THOUGHTS OF HARMING ANYONE ELSE?: NO

## 2025-03-03 SDOH — HEALTH STABILITY: MENTAL HEALTH: HOW OFTEN DO YOU HAVE SIX OR MORE DRINKS ON ONE OCCASION?: NEVER

## 2025-03-03 SDOH — SOCIAL STABILITY: SOCIAL INSECURITY: WERE YOU ABLE TO COMPLETE ALL THE BEHAVIORAL HEALTH SCREENINGS?: YES

## 2025-03-03 SDOH — ECONOMIC STABILITY: FOOD INSECURITY
WITHIN THE PAST 12 MONTHS, YOU WORRIED THAT YOUR FOOD WOULD RUN OUT BEFORE YOU GOT THE MONEY TO BUY MORE.: PATIENT DECLINED

## 2025-03-03 SDOH — SOCIAL STABILITY: SOCIAL INSECURITY: ARE YOU OR HAVE YOU BEEN THREATENED OR ABUSED PHYSICALLY, EMOTIONALLY, OR SEXUALLY BY ANYONE?: NO

## 2025-03-03 SDOH — SOCIAL STABILITY: SOCIAL INSECURITY: ARE THERE ANY APPARENT SIGNS OF INJURIES/BEHAVIORS THAT COULD BE RELATED TO ABUSE/NEGLECT?: NO

## 2025-03-03 SDOH — HEALTH STABILITY: PHYSICAL HEALTH: ON AVERAGE, HOW MANY MINUTES DO YOU ENGAGE IN EXERCISE AT THIS LEVEL?: 0 MIN

## 2025-03-03 SDOH — SOCIAL STABILITY: SOCIAL NETWORK: HOW OFTEN DO YOU ATTEND CHURCH OR RELIGIOUS SERVICES?: NEVER

## 2025-03-03 SDOH — SOCIAL STABILITY: SOCIAL NETWORK: HOW OFTEN DO YOU GET TOGETHER WITH FRIENDS OR RELATIVES?: MORE THAN THREE TIMES A WEEK

## 2025-03-03 ASSESSMENT — LIFESTYLE VARIABLES
HOW OFTEN DO YOU HAVE A DRINK CONTAINING ALCOHOL: NEVER
AUDIT-C TOTAL SCORE: 0
TOTAL SCORE: 0
AUDIT-C TOTAL SCORE: 0
PRESCIPTION_ABUSE_PAST_12_MONTHS: NO
SUBSTANCE_ABUSE_PAST_12_MONTHS: NO
AUDIT-C TOTAL SCORE: 0
EVER HAD A DRINK FIRST THING IN THE MORNING TO STEADY YOUR NERVES TO GET RID OF A HANGOVER: NO
SUBSTANCE_ABUSE_PAST_12_MONTHS: NO
PRESCIPTION_ABUSE_PAST_12_MONTHS: NO
HOW OFTEN DO YOU HAVE 6 OR MORE DRINKS ON ONE OCCASION: NEVER
SKIP TO QUESTIONS 9-10: 1
SKIP TO QUESTIONS 9-10: 1
HOW MANY STANDARD DRINKS CONTAINING ALCOHOL DO YOU HAVE ON A TYPICAL DAY: PATIENT DOES NOT DRINK
EVER FELT BAD OR GUILTY ABOUT YOUR DRINKING: NO
HAVE YOU EVER FELT YOU SHOULD CUT DOWN ON YOUR DRINKING: NO
HAVE PEOPLE ANNOYED YOU BY CRITICIZING YOUR DRINKING: NO

## 2025-03-03 ASSESSMENT — PATIENT HEALTH QUESTIONNAIRE - PHQ9
SUM OF ALL RESPONSES TO PHQ9 QUESTIONS 1 & 2: 0
2. FEELING DOWN, DEPRESSED OR HOPELESS: NOT AT ALL
1. LITTLE INTEREST OR PLEASURE IN DOING THINGS: NOT AT ALL

## 2025-03-03 ASSESSMENT — COGNITIVE AND FUNCTIONAL STATUS - GENERAL
MOBILITY SCORE: 19
STANDING UP FROM CHAIR USING ARMS: A LITTLE
MOVING TO AND FROM BED TO CHAIR: A LITTLE
DAILY ACTIVITIY SCORE: 19
DRESSING REGULAR LOWER BODY CLOTHING: A LITTLE
HELP NEEDED FOR BATHING: A LITTLE
PERSONAL GROOMING: A LITTLE
TOILETING: A LITTLE
HELP NEEDED FOR BATHING: A LITTLE
TOILETING: A LITTLE
PATIENT BASELINE BEDBOUND: NO
DRESSING REGULAR LOWER BODY CLOTHING: A LITTLE
MOBILITY SCORE: 19
CLIMB 3 TO 5 STEPS WITH RAILING: A LOT
DRESSING REGULAR UPPER BODY CLOTHING: A LITTLE
CLIMB 3 TO 5 STEPS WITH RAILING: A LOT
WALKING IN HOSPITAL ROOM: A LITTLE
DRESSING REGULAR UPPER BODY CLOTHING: A LITTLE
MOVING TO AND FROM BED TO CHAIR: A LITTLE
STANDING UP FROM CHAIR USING ARMS: A LITTLE
WALKING IN HOSPITAL ROOM: A LITTLE
PERSONAL GROOMING: A LITTLE

## 2025-03-03 ASSESSMENT — ACTIVITIES OF DAILY LIVING (ADL)
WALKS IN HOME: NEEDS ASSISTANCE
ADEQUATE_TO_COMPLETE_ADL: YES
FEEDING YOURSELF: INDEPENDENT
LACK_OF_TRANSPORTATION: PATIENT DECLINED
HEARING - LEFT EAR: FUNCTIONAL
TOILETING: INDEPENDENT
PATIENT'S MEMORY ADEQUATE TO SAFELY COMPLETE DAILY ACTIVITIES?: NO
GROOMING: INDEPENDENT
ASSISTIVE_DEVICE: DENTURES UPPER;WALKER
JUDGMENT_ADEQUATE_SAFELY_COMPLETE_DAILY_ACTIVITIES: NO
BATHING: NEEDS ASSISTANCE
HEARING - RIGHT EAR: FUNCTIONAL
DRESSING YOURSELF: INDEPENDENT

## 2025-03-03 ASSESSMENT — PAIN SCALES - GENERAL
PAINLEVEL_OUTOF10: 0 - NO PAIN

## 2025-03-03 ASSESSMENT — COLUMBIA-SUICIDE SEVERITY RATING SCALE - C-SSRS
1. IN THE PAST MONTH, HAVE YOU WISHED YOU WERE DEAD OR WISHED YOU COULD GO TO SLEEP AND NOT WAKE UP?: NO
6. HAVE YOU EVER DONE ANYTHING, STARTED TO DO ANYTHING, OR PREPARED TO DO ANYTHING TO END YOUR LIFE?: NO
2. HAVE YOU ACTUALLY HAD ANY THOUGHTS OF KILLING YOURSELF?: NO

## 2025-03-03 ASSESSMENT — PAIN - FUNCTIONAL ASSESSMENT
PAIN_FUNCTIONAL_ASSESSMENT: 0-10

## 2025-03-03 NOTE — PROGRESS NOTES
Enmanuel Bhatia is a 89 y.o. male on day 0 of admission presenting with Acute cystitis with hematuria.       03/03/25 1518   Discharge Planning   Living Arrangements Spouse/significant other   Support Systems Spouse/significant other;Family members   Assistance Needed resume [facility]   Type of Residence Private residence   Number of Stairs to Enter Residence 3   Number of Stairs Within Residence 1  (flight to basement)   Do you have animals or pets at home? Yes   Type of Animals or Pets a cat and a dog   Who is requesting discharge planning? Provider   Home or Post Acute Services In home services   Type of Home Care Services Home nursing visits;Home health aide;Home OT;Home PT   Expected Discharge Disposition Home Health   Does the patient need discharge transport arranged? No   Patient Choice   Provider Choice list and CMS website (https://medicare.gov/care-compare#search) for post-acute Quality and Resource Measure Data were provided and reviewed with: Other (Comment)  (declined, chooses to remain with current provider)   Patient / Family choosing to utilize agency / facility established prior to hospitalization Yes   Stroke Family Assessment   Stroke Family Assessment Needed No         Taina Cuenca RN

## 2025-03-03 NOTE — PROGRESS NOTES
Enmanuel Bhatia is a 89 y.o. male on day 0 of admission presenting with Acute cystitis with hematuria.    Patient is a readmission.  He was admitted to [facility] 02/03-02/08/2025, discharged to [facility]. He was subsequently transferred to [facility]. He was discharged from [place] 02/28/2025 and set up with [facility]. RNCC discussed returning to rehab, his wife Virgen says no. He will dc home, resume services with [facility].     Taina Cuenca RN

## 2025-03-03 NOTE — ED PROVIDER NOTES
HPI   Chief Complaint   Patient presents with    Fall       HPI  Patient is an 89-year-old male with history of dementia frequent falls, who presents to ED for a mechanical fall that occurred while patient was at home.  Patient is coming from skilled nursing facility where he resides.  Nursing staff concerned he has been more confused than usual and unsteady gait.  They report unwitnessed falls today, unclear if patient hit his head, no obvious trauma or injuries.  Patient unable to contribute to history.    Patient History   Past Medical History:   Diagnosis Date    At risk for falling     Benign prostatic hyperplasia without lower urinary tract symptoms     Enlarged prostate    Cognitive decline     Depression     Gait instability     GERD (gastroesophageal reflux disease)     Hydrocephalus     Memory impairment     Paresthesias     Personal history of other diseases of the musculoskeletal system and connective tissue     History of arthritis    Small bowel obstruction (Multi)     Urinary incontinence     Weakness      Past Surgical History:   Procedure Laterality Date    COLON SURGERY      OTHER SURGICAL HISTORY  07/22/2019    No history of surgery     No family history on file.  Social History     Tobacco Use    Smoking status: Former     Types: Cigarettes    Smokeless tobacco: Never   Substance Use Topics    Alcohol use: Not Currently    Drug use: Never       Physical Exam   ED Triage Vitals [03/03/25 1046]   Temperature Heart Rate Respirations BP   36.7 °C (98.1 °F) 78 18 (!) 107/37      Pulse Ox Temp src Heart Rate Source Patient Position   98 % -- -- --      BP Location FiO2 (%)     -- --       Physical Exam  Vitals reviewed.   Constitutional:       General: He is not in acute distress.     Appearance: He is not ill-appearing.   HENT:      Head: Normocephalic and atraumatic.   Eyes:      Extraocular Movements: Extraocular movements intact.   Cardiovascular:      Rate and Rhythm: Normal rate and regular  rhythm.      Heart sounds: Normal heart sounds.   Pulmonary:      Effort: Pulmonary effort is normal.      Breath sounds: Normal breath sounds.   Abdominal:      General: Abdomen is flat. There is no distension.      Palpations: Abdomen is soft.      Tenderness: There is no abdominal tenderness.   Musculoskeletal:         General: Normal range of motion.      Cervical back: Normal range of motion and neck supple.   Skin:     General: Skin is warm and dry.   Neurological:      General: No focal deficit present.      Mental Status: He is alert and oriented to person, place, and time.   Psychiatric:         Mood and Affect: Mood normal.         Behavior: Behavior normal.           ED Course & MDM   Diagnoses as of 03/03/25 1323   Acute cystitis with hematuria   Frequent falls   Generalized weakness                 No data recorded     M[place] Coma Scale Score: 15 (03/03/25 1048 : Airam Rasheed LPN)                           Medical Decision Making  Parts of this chart have been completed using voice recognition software. Please excuse any errors of transcription.  My thought process and reason for plan has been formulated from the time that I saw the patient until the time of disposition and is not specific to one specific moment during their visit and furthermore my MDM encompasses this entire chart and not only this text box.    HPI:   A medically appropriate HPI was obtained, outlined above.    Enmanuel Bhatia is a  89 y.o. male    Chief Complaint   Patient presents with    Fall       Past Medical History:   Diagnosis Date    At risk for falling     Benign prostatic hyperplasia without lower urinary tract symptoms     Enlarged prostate    Cognitive decline     Depression     Gait instability     GERD (gastroesophageal reflux disease)     Hydrocephalus     Memory impairment     Paresthesias     Personal history of other diseases of the musculoskeletal system and connective tissue     History of arthritis    Small  bowel obstruction (Multi)     Urinary incontinence     Weakness        Past Surgical History:   Procedure Laterality Date    COLON SURGERY      OTHER SURGICAL HISTORY  07/22/2019    No history of surgery       Social History     Tobacco Use    Smoking status: Former     Types: Cigarettes    Smokeless tobacco: Never   Substance Use Topics    Alcohol use: Not Currently    Drug use: Never       No family history on file.    No Known Allergies    Current Outpatient Medications   Medication Instructions    acetaminophen (TYLENOL) 650 mg, oral, Every 4 hours PRN    bisacodyl (DULCOLAX) 10 mg, oral, Daily PRN, Do not crush, chew, or split.    busPIRone (BUSPAR) 7.5 mg, oral, 2 times daily    cholecalciferol (VITAMIN D-3) 125 mcg, oral, Daily    cyanocobalamin (Vitamin B-12) 1,000 mcg tablet 1 tablet, oral, Daily    enoxaparin (LOVENOX) 40 mg, subcutaneous, Daily    fluticasone (Flonase) 50 mcg/actuation nasal spray 1 spray, Each Nostril, Daily, Shake gently. Before first use, prime pump. After use, clean tip and replace cap.    gabapentin (NEURONTIN) 300 mg, oral, Nightly    mirtazapine (REMERON) 15 mg, oral, Nightly    nystatin (Mycostatin) cream 1 Application, Topical, 2 times daily    OLANZapine (ZYPREXA) 2.5 mg, oral, Every 8 hours PRN    OLANZapine zydis (ZYPREXA) 5 mg, oral, Nightly    omeprazole (PRILOSEC) 20 mg, oral, Daily before breakfast, Do not crush or chew.    ondansetron ODT (ZOFRAN-ODT) 4 mg, oral, Every 8 hours PRN    oxybutynin XL (DITROPAN-XL) 5 mg, oral, Daily, Do not crush, chew, or split.    trospium (SANCTURA) 20 mg, oral, Nightly   for details    Exam:   Patient Vitals for the past 24 hrs:   BP Temp Temp src Pulse Resp SpO2   03/04/25 1622 130/69 36.8 °C (98.2 °F) Oral 87 17 98 %   03/04/25 0710 114/60 36.3 °C (97.3 °F) Oral 82 16 97 %   03/04/25 0047 100/56 36.7 °C (98.1 °F) Oral 77 18 92 %       A medically appropriate exam performed, outlined above, given the known history and  presentation.    EKG/Cardiac monitor:   If EKG was done and, it was interpreted by attending physician, see their note for ED course for more detail.    Medications given during visit:  Medications   bisacodyl (Dulcolax) EC tablet 10 mg (has no administration in time range)   busPIRone (Buspar) tablet 7.5 mg (7.5 mg oral Given 3/4/25 0953)   cholecalciferol (Vitamin D-3) tablet 2,000 Units (2,000 Units oral Given 3/4/25 0953)   fluticasone (Flonase) nasal spray 1 spray (1 spray Each Nostril Not Given 3/4/25 1000)   gabapentin (Neurontin) capsule 300 mg (300 mg oral Given 3/3/25 2111)   mirtazapine (Remeron) tablet 15 mg (15 mg oral Given 3/3/25 2111)   nystatin (Mycostatin) cream 1 Application (1 Application Topical Not Given 3/4/25 1000)   OLANZapine zydis (ZyPREXA) disintegrating tablet 5 mg (5 mg oral Given 3/3/25 2111)   ondansetron ODT (Zofran-ODT) disintegrating tablet 4 mg (has no administration in time range)   pantoprazole (ProtoNix) EC tablet 40 mg (40 mg oral Given 3/4/25 0533)   oxybutynin (Ditropan) tablet 5 mg (5 mg oral Given 3/4/25 1600)   dextrose 5 % and sodium chloride 0.45 % with KCl 20 mEq/L infusion (75 mL/hr intravenous Restarted 3/4/25 1143)   acetaminophen (Tylenol) tablet 650 mg (has no administration in time range)     Or   acetaminophen (Tylenol) oral liquid 650 mg (has no administration in time range)     Or   acetaminophen (Tylenol) suppository 650 mg (has no administration in time range)   polyethylene glycol (Glycolax, Miralax) packet 17 g (17 g oral Given 3/4/25 0954)   docusate sodium (Colace) capsule 100 mg (100 mg oral Given 3/4/25 0953)   benzocaine-menthol (Cepastat Sore Throat) lozenge 1 lozenge (has no administration in time range)   dextromethorphan-guaifenesin (Robitussin DM)  mg/5 mL oral liquid 5 mL (has no administration in time range)   guaiFENesin (Mucinex) 12 hr tablet 600 mg (has no administration in time range)   enoxaparin (Lovenox) syringe 40 mg (40 mg  subcutaneous Given 3/4/25 1034)   cefTRIAXone (Rocephin) 1 g in dextrose (iso) IV 50 mL (0 g intravenous Stopped 3/4/25 1143)   OLANZapine (ZyPREXA) tablet 2.5 mg (has no administration in time range)     Or   OLANZapine (ZyPREXA) injection 2.5 mg (has no administration in time range)   melatonin tablet 3 mg (has no administration in time range)   cefTRIAXone (Rocephin) 2 g in dextrose (iso) IV 50 mL (0 g intravenous Stopped 3/3/25 1300)        Diagnostic/tests:  Labs Reviewed   CBC WITH AUTO DIFFERENTIAL - Abnormal       Result Value    WBC 7.0      nRBC 0.0      RBC 3.85 (*)     Hemoglobin 12.3 (*)     Hematocrit 36.1 (*)     MCV 94      MCH 31.9      MCHC 34.1      RDW 13.2      Platelets 271      Neutrophils % 65.2      Immature Granulocytes %, Automated 0.7      Lymphocytes % 19.9      Monocytes % 9.4      Eosinophils % 4.1      Basophils % 0.7      Neutrophils Absolute 4.56      Immature Granulocytes Absolute, Automated 0.05      Lymphocytes Absolute 1.39      Monocytes Absolute 0.66      Eosinophils Absolute 0.29      Basophils Absolute 0.05     COMPREHENSIVE METABOLIC PANEL - Abnormal    Glucose 90      Sodium 138      Potassium 4.0      Chloride 106      Bicarbonate 28      Anion Gap 8 (*)     Urea Nitrogen 10      Creatinine 0.84      eGFR 83      Calcium 8.7      Albumin 3.3 (*)     Alkaline Phosphatase 78      Total Protein 5.1 (*)     AST 21      Bilirubin, Total 0.5      ALT 20     B-TYPE NATRIURETIC PEPTIDE - Abnormal     (*)     Narrative:        <100 pg/mL - Heart failure unlikely  100-299 pg/mL - Intermediate probability of acute heart                  failure exacerbation. Correlate with clinical                  context and patient history.    >=300 pg/mL - Heart Failure likely. Correlate with clinical                  context and patient history.    BNP testing is performed using different testing methodology at [facility] than at other [facility] hospitals. Direct result  comparisons should only be made within the same method.      URINALYSIS WITH REFLEX CULTURE AND MICROSCOPIC - Abnormal    Color, Urine Light-Yellow      Appearance, Urine Turbid (*)     Specific Gravity, Urine 1.009      pH, Urine 6.5      Protein, Urine NEGATIVE      Glucose, Urine Normal      Blood, Urine 0.03 (TRACE) (*)     Ketones, Urine NEGATIVE      Bilirubin, Urine NEGATIVE      Urobilinogen, Urine Normal      Nitrite, Urine NEGATIVE      Leukocyte Esterase, Urine 500 Manjinder/uL (*)    MICROSCOPIC ONLY, URINE - Abnormal    WBC, Urine 21-50 (*)     RBC, Urine 11-20 (*)     Bacteria, Urine 1+ (*)    CBC - Abnormal    WBC 7.5      nRBC 0.0      RBC 3.94 (*)     Hemoglobin 12.6 (*)     Hematocrit 36.7 (*)     MCV 93      MCH 32.0      MCHC 34.3      RDW 13.0      Platelets 276     COMPREHENSIVE METABOLIC PANEL - Abnormal    Glucose 103 (*)     Sodium 138      Potassium 4.4      Chloride 105      Bicarbonate 29      Anion Gap 8 (*)     Urea Nitrogen 12      Creatinine 0.99      eGFR 73      Calcium 8.7      Albumin 3.1 (*)     Alkaline Phosphatase 80      Total Protein 5.4 (*)     AST 17      Bilirubin, Total 0.4      ALT 18     URINE CULTURE - Normal    Urine Culture Culture in progress     MAGNESIUM - Normal    Magnesium 2.10     COAGULATION SCREEN - Normal    Protime 10.7      INR 1.0      aPTT 25.8      Narrative:     INR Therapeutic Range: 2.0-3.5   LIPASE - Normal    Lipase 32      Narrative:     Venipuncture immediately after or during the administration of Metamizole may lead to falsely low results. Testing should be performed immediately prior to Metamizole dosing.   SARS-COV-2 AND INFLUENZA A/B PCR - Normal    Flu A Result Not Detected      Flu B Result Not Detected      Coronavirus 2019, PCR Not Detected      Narrative:     This assay is an FDA-cleared, in vitro diagnostic nucleic acid amplification test for the qualitative detection and differentiation of SARS CoV-2/ Influenza A/B from nasopharyngeal  specimens collected from individuals with signs and symptoms of respiratory tract infections, and has been validated for use at [facility]. Negative results do not preclude COVID-19/ Influenza A/B infections and should not be used as the sole basis for diagnosis, treatment, or other management decisions. Testing for SARS CoV-2 is recommended only for patients who meet current clinical and/or epidemiological criteria defined by federal, state, or local public health directives.   RSV PCR - Normal    RSV PCR Not Detected      Narrative:     This assay is an FDA-cleared, in vitro diagnostic nucleic acid amplification test for the detection of RSV from nasopharyngeal specimens, and has been validated for use at [facility]. Negative results do not preclude RSV infections, and should not be used as the sole basis for diagnosis, treatment, or other management decisions. If Influenza A/B and RSV PCR results are negative, testing for Parainfluenza virus, Adenovirus and Metapneumovirus is routinely performed for pediatric oncology and intensive care inpatients at [facility] [place] [facility], and is available on other patients by placing an add-on request.       SERIAL TROPONIN-INITIAL - Normal    Troponin I, High Sensitivity 11      Narrative:     Less than 99th percentile of normal range cutoff-  Female and children under 18 years old <14 ng/L; Male <21 ng/L: Negative  Repeat testing should be performed if clinically indicated.     Female and children under 18 years old 14-50 ng/L; Male 21-50 ng/L:  Consistent with possible cardiac damage and possible increased clinical   risk. Serial measurements may help to assess extent of myocardial damage.     >50 ng/L: Consistent with cardiac damage, increased clinical risk and  myocardial infarction. Serial measurements may help assess extent of   myocardial damage.      NOTE: Children less than 1 year old may have higher baseline troponin   levels and results  should be interpreted in conjunction with the overall   clinical context.     NOTE: Troponin I testing is performed using a different   testing methodology at [facility] than at other   [facility]. Direct result comparisons should only   be made within the same method.   SERIAL TROPONIN, 1 HOUR - Normal    Troponin I, High Sensitivity 11      Narrative:     Less than 99th percentile of normal range cutoff-  Female and children under 18 years old <14 ng/L; Male <21 ng/L: Negative  Repeat testing should be performed if clinically indicated.     Female and children under 18 years old 14-50 ng/L; Male 21-50 ng/L:  Consistent with possible cardiac damage and possible increased clinical   risk. Serial measurements may help to assess extent of myocardial damage.     >50 ng/L: Consistent with cardiac damage, increased clinical risk and  myocardial infarction. Serial measurements may help assess extent of   myocardial damage.      NOTE: Children less than 1 year old may have higher baseline troponin   levels and results should be interpreted in conjunction with the overall   clinical context.     NOTE: Troponin I testing is performed using a different   testing methodology at [facility] than at other   [facility]. Direct result comparisons should only   be made within the same method.   TROPONIN SERIES- (INITIAL, 1 HR)    Narrative:     The following orders were created for panel order Troponin I Series, High Sensitivity (0, 1 HR).  Procedure                               Abnormality         Status                     ---------                               -----------         ------                     Troponin I, High Sensiti...[[id number]]  Normal              Final result               Troponin, High Sensitivi...[[id number]]  Normal              Final result                 Please view results for these tests on the individual orders.   URINALYSIS WITH REFLEX CULTURE AND MICROSCOPIC     Narrative:     The following orders were created for panel order Urinalysis with Reflex Culture and Microscopic.  Procedure                               Abnormality         Status                     ---------                               -----------         ------                     Urinalysis with Reflex C...[[id number]]  Abnormal            Final result               Extra Urine Gray Tube[[id number]]                            Final result                 Please view results for these tests on the individual orders.   EXTRA URINE GRAY TUBE    Extra Tube Hold for add-ons.          US renal complete   Final Result   Normal ultrasound of the kidneys.        Enlarged prostate gland.        MACRO:   None.        Signed by: Corinne Schwartz 3/4/2025 10:02 AM   Dictation workstation:   [id number]      CT head wo IV contrast   Final Result   Head: No acute intracranial abnormality or calvarial fracture was   identified.        Similar disproportionate central atrophy and nonspecific low-density   white matter changes.        Cervical spine: No fracture or interval subluxation.        Asymmetry of the C1-2 facet joints likely positional.        Similar advanced degenerative changes and mild arterial vascular   calcifications.        MACRO:   None             Signed by: Mitch Davis 3/3/2025 12:20 PM   Dictation workstation:   [id number]      CT cervical spine wo IV contrast   Final Result   Head: No acute intracranial abnormality or calvarial fracture was   identified.        Similar disproportionate central atrophy and nonspecific low-density   white matter changes.        Cervical spine: No fracture or interval subluxation.        Asymmetry of the C1-2 facet joints likely positional.        Similar advanced degenerative changes and mild arterial vascular   calcifications.        MACRO:   None             Signed by: Mitch Davis 3/3/2025 12:20 PM   Dictation workstation:   [id number]      XR pelvis 1-2 views    Final Result   No acute osseous abnormality.        I personally reviewed the images/study and I agree with the findings   as stated by Radiology resident.        MACRO:   None        Signed by: Umer Amos 3/3/2025 12:17 PM   Dictation workstation:   [id number]      XR chest 1 view   Final Result   Mild nonspecific interstitial prominence diffusely. No focal   infiltrates.             I personally reviewed the images/study and I agree with the findings   as stated by Radiology resident.        MACRO:   None        Signed by: Umer Amos 3/3/2025 12:16 PM   Dictation workstation:   [id number]             [facility] Summary:  Lab work is notable for urinary tract infection.  No significant leukocytosis or anemia.  CMP shows no acute kidney injury or electrolyte abnormality.  No transaminitis.  Troponins within normal limits.  Viral PCR are negative.  Coagulation screen is within normal limits.  Patient admitted to medicine service for further management.    Disposition:  ED Prescriptions    None           Procedure  Procedures     Flakito Mcgarry PA-C  03/04/25 1595

## 2025-03-03 NOTE — PROGRESS NOTES
Pharmacy Medication History Review    Enmanuel Bhatia is a 89 y.o. male admitted for Acute cystitis with hematuria. Pharmacy reviewed the patient's [id number] medications and allergies for accuracy.    Medications ADDED:  Omeprazole 20mg   Oxybutynin 5mg ER  Medications CHANGED:  Vitamin D3 1000unit  Vitamin B-12 1000mcg   Olanazapine 5mg - not taking   Medications REMOVED:   None      The list below reflects the updated PTA list. Comments regarding how patient may be taking medications differently can be found in the Admit Orders Activity  Prior to Admission Medications   Prescriptions Last Dose Informant   OLANZapine (ZyPREXA) 2.5 mg tablet Unknown Spouse/Significant Other   Sig: Take 1 tablet (2.5 mg) by mouth every 8 hours if needed (agitation).   acetaminophen (Tylenol) 325 mg tablet Unknown Spouse/Significant Other   Sig: Take 2 tablets (650 mg) by mouth every 4 hours if needed for fever (temp greater than 38.0 C) (greater than or equal to 38 C).   bisacodyl (Dulcolax) 5 mg EC tablet Unknown Spouse/Significant Other   Sig: Take 2 tablets (10 mg) by mouth once daily as needed for constipation. Do not crush, chew, or split.   busPIRone (Buspar) 7.5 mg tablet Unknown Spouse/Significant Other   Sig: Take 1 tablet (7.5 mg) by mouth 2 times a day.   cholecalciferol (Vitamin D-3) 125 mcg (5000 UT) capsule Unknown Spouse/Significant Other   Sig: Take 1 capsule (125 mcg) by mouth once daily.   cyanocobalamin (Vitamin B-12) 1,000 mcg tablet Unknown Spouse/Significant Other   Sig: Take 1 tablet (1,000 mcg) by mouth once daily.   enoxaparin (Lovenox) 40 mg/0.4 mL syringe 3/2/2025 Spouse/Significant Other   Sig: Inject 0.4 mL (40 mg) under the skin once daily.   fluticasone (Flonase) 50 mcg/actuation nasal spray Unknown Spouse/Significant Other   Sig: Administer 1 spray into each nostril once daily. Shake gently. Before first use, prime pump. After use, clean tip and replace cap.   gabapentin (Neurontin) 300 mg  capsule Unknown Spouse/Significant Other   Sig: Take 1 capsule (300 mg) by mouth once daily at bedtime.   mirtazapine (Remeron) 15 mg tablet Unknown Spouse/Significant Other   Sig: Take 1 tablet (15 mg) by mouth once daily at bedtime.   nystatin (Mycostatin) cream Unknown Spouse/Significant Other   Sig: Apply 1 Application topically 2 times a day.   omeprazole (PriLOSEC) 20 mg DR capsule Unknown Spouse/Significant Other   Sig: Take 1 capsule (20 mg) by mouth once daily in the morning. Take before meals. Do not crush or chew.   ondansetron ODT (Zofran-ODT) 4 mg disintegrating tablet Unknown Spouse/Significant Other   Sig: Dissolve 1 tablet (4 mg) in the mouth every 8 hours if needed for nausea.   oxybutynin XL (Ditropan-XL) 5 mg 24 hr tablet Unknown Spouse/Significant Other   Sig: Take 1 tablet (5 mg) by mouth once daily. Do not crush, chew, or split.   trospium (Sanctura) 20 mg tablet Unknown Spouse/Significant Other   Sig: Take 1 tablet (20 mg) by mouth once daily at bedtime.      Facility-Administered Medications: None        The list below reflects the updated allergy list. Please review each documented allergy for additional clarification and justification.  Allergies  Reviewed by Jess Walters CPhT on 3/3/2025   No Known Allergies         Pharmacy has been updated to [facility].    Sources used to complete the med history include dispense history, PTA medication list, physical medications, patient/spouse interview. Spouse is a poor historian.    Below are additional concerns with the patient's PTA list.  There is a large amount of confusion surrounding patient current medications. Patient discharged Friday 2/28/25 from SNF. Spouse reports nurse came to house to give enoxaparin and teach grandson administration. Spouse then reports that someone called and told them to stop giving enoxaparin yesterday 03/02/25. I understood this only after several minutes of questioning to what spouse was referring  to. Spouse also reports pharmacist telling her yesterday 03/02/25 to throw out all the medications. After several more minutes of questioning she tells me she thinks he means that there are a lot of medications from before and after SNF that are the same. Patient and spouse do not use a daily pill dispense. All medications for both are in a cardboard box. I am concerned about medication administration for patient and spouse.    Jess Walters, CPJose-Adv  Please reach out via [employer] Chat for questions

## 2025-03-03 NOTE — ED PROVIDER NOTES
HPI   Chief Complaint   Patient presents with    Fall       Patient is an 89-year-old male that has a history of dementia which presents to the emergency department for evaluation of frequent falls, confusion.  Patient currently lives at a skilled nursing facility and has been acting more confused and unsteady recently.  He has had several falls recently including 2 today.  Falls today were unwitnessed however is unclear whether patient hit his head or loss consciousness.  Patient was sent in for further evaluation given the multiple falls and unsteadiness.  Patient denies any complaints at this time however is a very poor historian due to his dementia.      History provided by:  Patient and EMS personnel          Patient History   Past Medical History:   Diagnosis Date    At risk for falling     Benign prostatic hyperplasia without lower urinary tract symptoms     Enlarged prostate    Cognitive decline     Depression     Gait instability     GERD (gastroesophageal reflux disease)     Hydrocephalus     Memory impairment     Paresthesias     Personal history of other diseases of the musculoskeletal system and connective tissue     History of arthritis    Small bowel obstruction (Multi)     Urinary incontinence     Weakness      Past Surgical History:   Procedure Laterality Date    COLON SURGERY      OTHER SURGICAL HISTORY  07/22/2019    No history of surgery     No family history on file.  Social History     Tobacco Use    Smoking status: Former     Types: Cigarettes    Smokeless tobacco: Never   Substance Use Topics    Alcohol use: Not Currently    Drug use: Never       Physical Exam   ED Triage Vitals [03/03/25 1046]   Temperature Heart Rate Respirations BP   36.7 °C (98.1 °F) 78 18 (!) 107/37      Pulse Ox Temp src Heart Rate Source Patient Position   98 % -- -- --      BP Location FiO2 (%)     -- --       Physical Exam  Vitals and nursing note reviewed.   Constitutional:       General: He is not in acute  distress.     Appearance: Normal appearance. He is not ill-appearing.   HENT:      Head: Normocephalic and atraumatic.      Mouth/Throat:      Mouth: Mucous membranes are moist.   Eyes:      Extraocular Movements: Extraocular movements intact.      Pupils: Pupils are equal, round, and reactive to light.   Cardiovascular:      Rate and Rhythm: Normal rate and regular rhythm.   Pulmonary:      Effort: Pulmonary effort is normal. No respiratory distress.      Breath sounds: No wheezing or rhonchi.   Abdominal:      General: Abdomen is flat.      Tenderness: There is no abdominal tenderness. There is no guarding or rebound.   Musculoskeletal:      Cervical back: Normal range of motion and neck supple.      Right lower leg: No edema.      Left lower leg: No edema.   Skin:     General: Skin is warm and dry.   Neurological:      Mental Status: He is alert. Mental status is at baseline. He is disoriented.       Recent Results (from the past 24 hours)   CBC    Collection Time: 03/04/25  7:47 AM   Result Value Ref Range    WBC 7.5 4.4 - 11.3 x10*3/uL    nRBC 0.0 0.0 - 0.0 /100 WBCs    RBC 3.94 (L) 4.50 - 5.90 x10*6/uL    Hemoglobin 12.6 (L) 13.5 - 17.5 g/dL    Hematocrit 36.7 (L) 41.0 - 52.0 %    MCV 93 80 - 100 fL    MCH 32.0 26.0 - 34.0 pg    MCHC 34.3 32.0 - 36.0 g/dL    RDW 13.0 11.5 - 14.5 %    Platelets 276 150 - 450 x10*3/uL   Comprehensive metabolic panel    Collection Time: 03/04/25  7:47 AM   Result Value Ref Range    Glucose 103 (H) 74 - 99 mg/dL    Sodium 138 136 - 145 mmol/L    Potassium 4.4 3.5 - 5.3 mmol/L    Chloride 105 98 - 107 mmol/L    Bicarbonate 29 21 - 32 mmol/L    Anion Gap 8 (L) 10 - 20 mmol/L    Urea Nitrogen 12 6 - 23 mg/dL    Creatinine 0.99 0.50 - 1.30 mg/dL    eGFR 73 >60 mL/min/1.73m*2    Calcium 8.7 8.6 - 10.3 mg/dL    Albumin 3.1 (L) 3.4 - 5.0 g/dL    Alkaline Phosphatase 80 33 - 136 U/L    Total Protein 5.4 (L) 6.4 - 8.2 g/dL    AST 17 9 - 39 U/L    Bilirubin, Total 0.4 0.0 - 1.2 mg/dL    ALT  18 10 - 52 U/L         ED Course & MDM   Diagnoses as of 03/03/25 1325   Acute cystitis with hematuria   Frequent falls   Generalized weakness                 No data recorded     [place] Coma Scale Score: 15 (03/03/25 1048 : Airam Rasheed LPN)                           Medical Decision Making  Patient is an 89-year-old male that presents to the emergency department for evaluation of falls, weakness.  Patient uncomfortable.  No obvious distress on exam.  He is alert and oriented to self and place.  He does have some generalized weakness but no focal motor deficits.  He is confused however this does appear to be his baseline mental status.  CT scan of the head and cervical spine was ordered along with x-ray of the chest and pelvis.  Chest x-ray shows nonspecific interstitial prominence but no evidence of pneumonia, pneumothorax or wide mediastinum.  X-ray the pelvis shows no fracture or dislocation.  CT scan of the cervical spine shows no fracture or dislocation as well as no subluxation.  CT scan of the brain shows no evidence of intracranial hemorrhage or stroke.  Blood work was remarkable for normal white count of 7 with normal electrolytes, kidney function.  He did have mildly elevated BNP but no obvious signs of congestive heart failure at this time and patient has normal troponin of 11.  He did just negative for COVID-19, influenza.  Patient does have urinary tract infection with leukocyte esterase present as well as 1+ bacteria and was given IV ceftriaxone.  Given his frequent falls and urinary tract infection patient will be admitted for continued IV antibiotics and further evaluation of his unsteadiness.  Case was discussed with Dr. Muñoz who is covering for patient's primary care physician and she accepted patient for admission.    Patient was seen by both myself and advanced practitioner.  I personally saw the patient and made/approved the management plan and take responsibility for the patient  management     I independently interpreted the following study(s) CT scan head, CT scan cervical spine, chest x-ray, pelvic x-ray.  CT scan of the head shows no evidence of intracranial hemorrhage or acute stroke.  CT scan cervical spine shows no fracture or subluxation.  Chest x-ray shows no evidence pneumonia, pneumothorax or wide mediastinum.  X-ray of the pelvis shows no fracture or dislocation.        Procedure  Procedures     Teo Harris DO  03/04/25 7728

## 2025-03-03 NOTE — ED TRIAGE NOTES
Pt FAM for frequent falls. Pt has a dementia. Pt was recently discharged from a SNF for falls. Pt denies any injuries from the falls.

## 2025-03-03 NOTE — PROGRESS NOTES
Enmanuel Bhatia is a 89 y.o. male on day 0 of admission presenting with Acute cystitis with hematuria.       03/03/25 1520   ACS Disability Status   Are you deaf or do you have serious difficulty hearing? N   Are you blind or do you have serious difficulty seeing, even when wearing glasses? N   Because of a physical, mental, or emotional condition, do you have serious difficulty concentrating, remembering, or making decisions? (5 years old or older) Y   Do you have serious difficulty walking or climbing stairs? Y   Do you have serious difficulty dressing or bathing? Y   Because of a physical, mental, or emotional condition, do you have serious difficulty doing errands alone such as visiting the doctor? Y         Taina Cuenca RN

## 2025-03-04 ENCOUNTER — APPOINTMENT (OUTPATIENT)
Dept: RADIOLOGY | Facility: HOSPITAL | Age: OVER 89
End: 2025-03-04
Payer: MEDICARE

## 2025-03-04 PROBLEM — G91.2 NPH (NORMAL PRESSURE HYDROCEPHALUS) (MULTI): Status: ACTIVE | Noted: 2025-03-04

## 2025-03-04 LAB
ALBUMIN SERPL BCP-MCNC: 3.1 G/DL (ref 3.4–5)
ALP SERPL-CCNC: 80 U/L (ref 33–136)
ALT SERPL W P-5'-P-CCNC: 18 U/L (ref 10–52)
ANION GAP SERPL CALCULATED.3IONS-SCNC: 8 MMOL/L (ref 10–20)
AST SERPL W P-5'-P-CCNC: 17 U/L (ref 9–39)
BILIRUB SERPL-MCNC: 0.4 MG/DL (ref 0–1.2)
BUN SERPL-MCNC: 12 MG/DL (ref 6–23)
CALCIUM SERPL-MCNC: 8.7 MG/DL (ref 8.6–10.3)
CHLORIDE SERPL-SCNC: 105 MMOL/L (ref 98–107)
CO2 SERPL-SCNC: 29 MMOL/L (ref 21–32)
CREAT SERPL-MCNC: 0.99 MG/DL (ref 0.5–1.3)
EGFRCR SERPLBLD CKD-EPI 2021: 73 ML/MIN/1.73M*2
ERYTHROCYTE [DISTWIDTH] IN BLOOD BY AUTOMATED COUNT: 13 % (ref 11.5–14.5)
GLUCOSE SERPL-MCNC: 103 MG/DL (ref 74–99)
HCT VFR BLD AUTO: 36.7 % (ref 41–52)
HGB BLD-MCNC: 12.6 G/DL (ref 13.5–17.5)
MCH RBC QN AUTO: 32 PG (ref 26–34)
MCHC RBC AUTO-ENTMCNC: 34.3 G/DL (ref 32–36)
MCV RBC AUTO: 93 FL (ref 80–100)
NRBC BLD-RTO: 0 /100 WBCS (ref 0–0)
PLATELET # BLD AUTO: 276 X10*3/UL (ref 150–450)
POTASSIUM SERPL-SCNC: 4.4 MMOL/L (ref 3.5–5.3)
PROT SERPL-MCNC: 5.4 G/DL (ref 6.4–8.2)
RBC # BLD AUTO: 3.94 X10*6/UL (ref 4.5–5.9)
SODIUM SERPL-SCNC: 138 MMOL/L (ref 136–145)
WBC # BLD AUTO: 7.5 X10*3/UL (ref 4.4–11.3)

## 2025-03-04 PROCEDURE — 80053 COMPREHEN METABOLIC PANEL: CPT | Performed by: INTERNAL MEDICINE

## 2025-03-04 PROCEDURE — 2500000002 HC RX 250 W HCPCS SELF ADMINISTERED DRUGS (ALT 637 FOR MEDICARE OP, ALT 636 FOR OP/ED): Performed by: INTERNAL MEDICINE

## 2025-03-04 PROCEDURE — 90792 PSYCH DIAG EVAL W/MED SRVCS: CPT

## 2025-03-04 PROCEDURE — 97530 THERAPEUTIC ACTIVITIES: CPT | Mod: GP

## 2025-03-04 PROCEDURE — 96365 THER/PROPH/DIAG IV INF INIT: CPT | Mod: 59

## 2025-03-04 PROCEDURE — 97162 PT EVAL MOD COMPLEX 30 MIN: CPT | Mod: GP

## 2025-03-04 PROCEDURE — 2500000004 HC RX 250 GENERAL PHARMACY W/ HCPCS (ALT 636 FOR OP/ED): Performed by: INTERNAL MEDICINE

## 2025-03-04 PROCEDURE — 76770 US EXAM ABDO BACK WALL COMP: CPT | Performed by: RADIOLOGY

## 2025-03-04 PROCEDURE — 99233 SBSQ HOSP IP/OBS HIGH 50: CPT | Performed by: INTERNAL MEDICINE

## 2025-03-04 PROCEDURE — 96372 THER/PROPH/DIAG INJ SC/IM: CPT | Performed by: INTERNAL MEDICINE

## 2025-03-04 PROCEDURE — 85027 COMPLETE CBC AUTOMATED: CPT | Performed by: INTERNAL MEDICINE

## 2025-03-04 PROCEDURE — 2500000001 HC RX 250 WO HCPCS SELF ADMINISTERED DRUGS (ALT 637 FOR MEDICARE OP): Performed by: INTERNAL MEDICINE

## 2025-03-04 PROCEDURE — 36415 COLL VENOUS BLD VENIPUNCTURE: CPT | Performed by: INTERNAL MEDICINE

## 2025-03-04 PROCEDURE — G0378 HOSPITAL OBSERVATION PER HR: HCPCS

## 2025-03-04 PROCEDURE — 76770 US EXAM ABDO BACK WALL COMP: CPT

## 2025-03-04 PROCEDURE — 97166 OT EVAL MOD COMPLEX 45 MIN: CPT | Mod: GO

## 2025-03-04 PROCEDURE — 2500000005 HC RX 250 GENERAL PHARMACY W/O HCPCS

## 2025-03-04 RX ORDER — TALC
3 POWDER (GRAM) TOPICAL DAILY
Status: DISCONTINUED | OUTPATIENT
Start: 2025-03-04 | End: 2025-03-06 | Stop reason: HOSPADM

## 2025-03-04 RX ORDER — OLANZAPINE 10 MG/2ML
2.5 INJECTION, POWDER, FOR SOLUTION INTRAMUSCULAR EVERY 8 HOURS PRN
Status: DISCONTINUED | OUTPATIENT
Start: 2025-03-04 | End: 2025-03-06 | Stop reason: HOSPADM

## 2025-03-04 RX ORDER — OLANZAPINE 2.5 MG/1
2.5 TABLET ORAL EVERY 8 HOURS PRN
Status: DISCONTINUED | OUTPATIENT
Start: 2025-03-04 | End: 2025-03-06 | Stop reason: HOSPADM

## 2025-03-04 RX ADMIN — GABAPENTIN 300 MG: 300 CAPSULE ORAL at 21:19

## 2025-03-04 RX ADMIN — BUSPIRONE HYDROCHLORIDE 7.5 MG: 5 TABLET ORAL at 09:53

## 2025-03-04 RX ADMIN — DEXTROSE MONOHYDRATE, SODIUM CHLORIDE, AND POTASSIUM CHLORIDE 75 ML/HR: 50; 4.5; 1.49 INJECTION, SOLUTION INTRAVENOUS at 21:19

## 2025-03-04 RX ADMIN — ENOXAPARIN SODIUM 40 MG: 40 INJECTION SUBCUTANEOUS at 09:53

## 2025-03-04 RX ADMIN — BUSPIRONE HYDROCHLORIDE 7.5 MG: 5 TABLET ORAL at 21:19

## 2025-03-04 RX ADMIN — CEFTRIAXONE SODIUM 1 G: 1 INJECTION, SOLUTION INTRAVENOUS at 11:13

## 2025-03-04 RX ADMIN — OLANZAPINE 5 MG: 5 TABLET, ORALLY DISINTEGRATING ORAL at 21:19

## 2025-03-04 RX ADMIN — Medication 2000 UNITS: at 09:53

## 2025-03-04 RX ADMIN — NYSTATIN 1 APPLICATION: 100000 CREAM TOPICAL at 21:20

## 2025-03-04 RX ADMIN — DOCUSATE SODIUM 100 MG: 100 CAPSULE, LIQUID FILLED ORAL at 09:53

## 2025-03-04 RX ADMIN — OXYBUTYNIN CHLORIDE 5 MG: 5 TABLET ORAL at 16:00

## 2025-03-04 RX ADMIN — MIRTAZAPINE 15 MG: 15 TABLET, FILM COATED ORAL at 21:19

## 2025-03-04 RX ADMIN — OXYBUTYNIN CHLORIDE 5 MG: 5 TABLET ORAL at 21:19

## 2025-03-04 RX ADMIN — POLYETHYLENE GLYCOL 3350 17 G: 17 POWDER, FOR SOLUTION ORAL at 09:54

## 2025-03-04 RX ADMIN — Medication 3 MG: at 18:04

## 2025-03-04 RX ADMIN — OXYBUTYNIN CHLORIDE 5 MG: 5 TABLET ORAL at 09:53

## 2025-03-04 RX ADMIN — PANTOPRAZOLE SODIUM 40 MG: 20 TABLET, DELAYED RELEASE ORAL at 05:33

## 2025-03-04 ASSESSMENT — COGNITIVE AND FUNCTIONAL STATUS - GENERAL
MOVING TO AND FROM BED TO CHAIR: A LITTLE
DRESSING REGULAR LOWER BODY CLOTHING: A LOT
PERSONAL GROOMING: A LITTLE
MOBILITY SCORE: 10
MOVING TO AND FROM BED TO CHAIR: A LITTLE
WALKING IN HOSPITAL ROOM: TOTAL
TURNING FROM BACK TO SIDE WHILE IN FLAT BAD: A LOT
DAILY ACTIVITIY SCORE: 19
CLIMB 3 TO 5 STEPS WITH RAILING: A LOT
STANDING UP FROM CHAIR USING ARMS: A LOT
WALKING IN HOSPITAL ROOM: A LITTLE
TOILETING: A LITTLE
DRESSING REGULAR UPPER BODY CLOTHING: A LITTLE
DRESSING REGULAR UPPER BODY CLOTHING: A LOT
MOVING FROM LYING ON BACK TO SITTING ON SIDE OF FLAT BED WITH BEDRAILS: A LOT
DRESSING REGULAR LOWER BODY CLOTHING: A LITTLE
HELP NEEDED FOR BATHING: A LITTLE
TURNING FROM BACK TO SIDE WHILE IN FLAT BAD: A LITTLE
TURNING FROM BACK TO SIDE WHILE IN FLAT BAD: A LITTLE
MOVING TO AND FROM BED TO CHAIR: A LOT
STANDING UP FROM CHAIR USING ARMS: A LITTLE
DAILY ACTIVITIY SCORE: 19
MOBILITY SCORE: 18
STANDING UP FROM CHAIR USING ARMS: A LITTLE
PERSONAL GROOMING: A LITTLE
CLIMB 3 TO 5 STEPS WITH RAILING: A LOT
CLIMB 3 TO 5 STEPS WITH RAILING: TOTAL
TOILETING: A LOT
HELP NEEDED FOR BATHING: A LOT
DRESSING REGULAR UPPER BODY CLOTHING: A LITTLE
EATING MEALS: A LITTLE
TOILETING: A LITTLE
MOBILITY SCORE: 18
DAILY ACTIVITIY SCORE: 14
WALKING IN HOSPITAL ROOM: A LITTLE
PERSONAL GROOMING: A LITTLE
HELP NEEDED FOR BATHING: A LITTLE
DRESSING REGULAR LOWER BODY CLOTHING: A LITTLE

## 2025-03-04 ASSESSMENT — ACTIVITIES OF DAILY LIVING (ADL)
BATHING_ASSISTANCE: MAXIMAL
ADL_ASSISTANCE: NEEDS ASSISTANCE
ADL_ASSISTANCE: INDEPENDENT

## 2025-03-04 ASSESSMENT — PAIN SCALES - GENERAL
PAINLEVEL_OUTOF10: 0 - NO PAIN

## 2025-03-04 ASSESSMENT — PAIN - FUNCTIONAL ASSESSMENT
PAIN_FUNCTIONAL_ASSESSMENT: 0-10
PAIN_FUNCTIONAL_ASSESSMENT: 0-10
PAIN_FUNCTIONAL_ASSESSMENT: FLACC (FACE, LEGS, ACTIVITY, CRY, CONSOLABILITY)

## 2025-03-04 NOTE — PROGRESS NOTES
03/04/25 0835   Discharge Planning   Expected Discharge Disposition Home Health  ([facility])     Patient to resume care upon discharge   Patient and spouse declining SNF, he was recently discharged from SNF  Will need to secure SOC prior to discharge     Will need internal referral for home health upon discharge  ** do not discharge without speaking to care coordination**

## 2025-03-04 NOTE — NURSING NOTE
Multiple IV's gave been placed since start of shift and pt has repeatedly ripped them out.  Joslyn Rodriges CNP has been notified.      21:45-  Dr. Muñoz aware that pt has pulled out multiple IV's.  Okay to leave IV out until dayshift when patient will hopefully be more alert

## 2025-03-04 NOTE — PROGRESS NOTES
Physical Therapy    Physical Therapy Evaluation & Treatment    Patient Name: Enmanuel Bhatia  MRN: [id number]  Department: [address]  Room: [address] [place], [place] [address]  Today's Date: 3/4/2025   Time Calculation  Start Time: 0910  Stop Time: 0935  Time Calculation (min): 25 min    Assessment/Plan   PT Assessment  PT Assessment Results: Decreased strength, Decreased range of motion, Decreased endurance, Impaired balance, Decreased mobility, Decreased coordination, Impaired judgement, Decreased safety awareness, Pain  Rehab Prognosis: Good  Barriers to Discharge Home: Cognition needs, Physical needs  Cognition Needs: 24hr supervision for safety awareness needed, Recollection or understanding of precautions/restrictions limited  Physical Needs: 24hr mobility assistance needed  Evaluation/Treatment Tolerance: Patient limited by pain  Medical Staff Made Aware: Yes  Barriers to Participation: Comorbidities  End of Session Communication: Bedside nurse  Assessment Comment: pt would benefit from skilled therapy services to improve strength, endurance and overall functional mobility  End of Session Patient Position: Up in chair, Alarm on (call button in reach)  IP OR SWING BED PT PLAN  Inpatient or Swing Bed: Inpatient  PT Plan  Treatment/Interventions: Transfer training, Bed mobility, Gait training, Balance training, Strengthening, Endurance training, Therapeutic exercise  PT Plan: Ongoing PT  PT Frequency: 4 times per week  PT Discharge Recommendations: Moderate intensity level of continued care  Equipment Recommended upon Discharge: Wheeled walker  PT Recommended Transfer Status: Assist x2  PT - OK to Discharge: Yes    Subjective   General Visit Information:  General  Reason for Referral: pt is a 88 y/o male admitted with acute cystitis without hematuria; frequent falls; impaired mobility  Referred By: Dr. Muñoz  Past Medical History Relevant to Rehab: osteopenia; dementia; falls; BPH; GERD: depression; urinary incontinence  Family/Caregiver  Present: No  Prior to Session Communication: Bedside nurse  Patient Position Received: Bed, 3 rail up, Alarm on  General Comment: pt alert and willing to work with therapy; pt confused but able to follow simple commands    Home Living:  Home Living  Type of Home: House  Lives With:  (spouse and grandson)  Home Adaptive Equipment:  (rolling walker)  Home Layout: Two level, Laundry in basement  Home Access:  3 entry stairs with 1 railing  Bathroom Shower/Tub: Walk-in shower  Bathroom Equipment: Grab bars in shower, Tub transfer bench  Home Living Comments: pt also has 12 stairs with 1 railing to basement laundry - but DOES NOT USE. pt reported staying on main floor; **pt is a questionable historian      Prior Level of Function:  Prior Function Per Pt/Caregiver Report  Level of [place]: Independent with ADLs and functional transfers, Needs assistance with homemaking  Receives Help From: Family  ADL Assistance: Independent  Homemaking Assistance: Needs assistance  Ambulatory Assistance: Independent (uses rolling walker)  Vocational: Retired  Prior Function Comments: pt reported managing well at home and driving . **pt is a poor historian      Precautions:  Precautions  Hearing/Visual Limitations: no glasses; moderate [place]  Medical Precautions: Fall precautions  Precautions Comment: educated and instructed pt in safety techniques during mobility             Objective   Pain:  Pain Assessment  Pain Assessment:  (FLACC 4/10 L LE during transfers; RN to medicate)    Cognition:  Cognition  Overall Cognitive Status: Impaired at baseline  Orientation Level: Disoriented to place, Disoriented to time, Disoriented to situation  Safety/Judgement:  (impaired safety awareness)  Insight: Moderate  Processing Speed: Delayed    General Assessments:     Activity Tolerance  Endurance:  (FAIR+ activity tolerance)    Sensation  Sensation Comment: c/o numbness B wrist and hands         Coordination  Coordination Comment: difficulty  advancing L LE due to pain    Postural Control  Posture Comment: forward flexed rigid trunk; mild R lateral trunk lean    Static Sitting Balance  Static Sitting-Comment/Number of Minutes: GOOD-  Dynamic Sitting Balance  Dynamic Sitting-Comments: GOOD-/FAIR+    Static Standing Balance  Static Standing-Comment/Number of Minutes: POOR+  Dynamic Standing Balance  Dynamic Standing-Comments: POOR      Functional Assessments:  Bed Mobility  Bed Mobility:  (supine to sit via log rolling with MOD A for trunk up, B LE and scooting to EOB. initially presented with mild R lateral trunk lean)    Transfers  Transfer:  (sit <-> stand with MOD A x 2/Handheld A x 2/ blocking B knees. pt unable to stand fully erect. POOR- eccentric control noted during stand to sit.)    Ambulation/Gait Training  Ambulation/Gait Training Performed:  (pt took 3-4 short inconsistent steps with MOD A x 2/Handheld A x 2. pt presented with soft flexed knees and difficulty weight shifting and advancing steps.)       Extremity/Trunk Assessments:  RUE   RUE :  (WFL with grossly  3/5 strength)  LUE   LUE:  (WFL with grossly 3/5 strength)  RLE   RLE :  (WFL with grossly 3/5 strength)  LLE   LLE :  (limited movement due to increased knee pain. strength N/T; mild abrasion L shin.)      Outcome Measures:  [facility] Basic Mobility  Turning from your back to your side while in a flat bed without using bedrails: A lot  Moving from lying on your back to sitting on the side of a flat bed without using bedrails: A lot  Moving to and from bed to chair (including a wheelchair): A lot  Standing up from a chair using your arms (e.g. wheelchair or bedside chair): A lot  To walk in hospital room: Total  Climbing 3-5 steps with railing: Total  Basic Mobility - Total Score: 10      Encounter Problems       Encounter Problems (Active)       Mobility       STG - Patient will ambulate 30' x 1 using rolling walker with MIN A (Progressing)       Start:  03/04/25    Expected End:   03/18/25            STG - Patient will negotiate 3 stairs using 1 railing and cane with MIN A (Progressing)       Start:  03/04/25    Expected End:  03/18/25               PT Transfers       STG - Patient to transfer to and from sit to supine with MIN A (Progressing)       Start:  03/04/25    Expected End:  03/18/25            STG - Patient will transfer sit to and from stand with MIN A (Progressing)       Start:  03/04/25    Expected End:  03/18/25               Pain - Adult              Education Documentation  Precautions, taught by Nam Garcia, PT at 3/4/2025 11:01 AM.  Learner: Patient  Readiness: Eager  Method: Explanation  Response: Verbalizes Understanding, Needs Reinforcement    Mobility Training, taught by Nam Garcia, PT at 3/4/2025 11:01 AM.  Learner: Patient  Readiness: Eager  Method: Explanation  Response: Verbalizes Understanding, Needs Reinforcement    Education Comments  No comments found.

## 2025-03-04 NOTE — CONSULTS
"Inpatient consult to Psychiatry  Consult performed by: [facility]  Consult ordered by: Aicha Muñoz MD  Reason for consult: Agitation        Referring Provider  Aicha Muñoz MD    History Of Present Illness  Enmanuel Bhatia is a 89 y.o. male presenting with frequent falls and confusion.     The patient's case was reviewed today with the assigned RN, who reported that there have been no behavioral issues throughout the day. There was a brief moment of frustration when the patient expressed discomfort with cold wipes, but overall, he exhibited no aggression and remained compliant with care, albeit grossly confused.    During our face-to-face encounter, the patient's wife was present at the bedside. While she recognized this provider from a previous hospital admission, the patient did not recall ever meeting us before. Psychiatry was consulted to assist the patient, who has been experiencing frequent confusion and behavioral issues, particularly noted during the previous night when nursing staff reported that he was \"up most of the night partying.\" Although he became agitated, he was able to settle down and sleep afterward and has otherwise been cooperative with therapy and treatment.    Upon conducting a mental status examination, it was evident that the patient was not oriented; he did not realize he was in a hospital and incorrectly identified the location as \"[place].\" He was unable to identify the current date or year and could not name the president. His cognitive function showed impairment, as he was unable to recite the months backward but could count forward through them. He correctly identified the number of people in the room, recognizing his wife and niece, but demonstrated impaired memory and time perception. The patient's wife expressed her frustration, claiming that her  has \"water on the brain,\" and accused the medical staff of failing to address his real issues, attributing his " "condition to a blood thinner medication administered during a previous hospitalization.    Efforts were made to allow the wife to express her concerns, but she often interrupted the encounter by answering questions on behalf of the patient and became increasingly frustrated with the inquiries, particularly when asked to recite the months of the year backward. She argued that such a task was unreasonable, even for an intelligent person, and grew upset when safety questions were posed, particularly regarding weapons at home. The patient indicated that he did have firearms, and the wife attempted to shut down this line of questioning, suggesting that we were trying to incriminate her . We clarified that these questions are standard for all patients and reassured her that we were not implying any wrongdoing.    In terms of mood and affect, the patient denied feeling depressed, stating that being in the hospital was \"not fun,\" and denied experiencing mood swings, irritability, or racing thoughts. He also denied hallucinations but mentioned hearing \"voices,\" which he clarified were coming from the television. The safety assessment indicated that he felt safe in the hospital, although he reported having hunting rifles at home, which are reportedly stored locked and separately from ammunition. He denied current pain, aside from discomfort from IV placement, though his wife contradicted some of his responses during the interview.    Regarding substance use history, the patient noted that he was a former smoker, stating he quit \"years ago,\" while a family member suggested it was three or four years. He also mentioned former alcohol use, saying it was \"last year,\" but the family member corrected him to \"25 years ago.\" No current substance use was reported. Socially, the patient lives with his wife and feels safe at home, denying any history of abuse or trauma.    In the medical context, it was observed that the patient " has a leg issue causing pain, and his wife expressed frustration about not having spoken with a “real doctor.” We apologized for any confusion and explained that we are part of a consulting team, and that questions related to medical issues would be addressed by the primary provider during their rounds.    We reassured the patient and his wife that we would continue to follow him during his hospitalization to ensure safety and that all of his needs are being met, while also managing any behavioral disturbances. The patient was encouraged to notify nursing staff if he felt afraid or worried. He denied having mood swings or irritability during the encounter and was given the opportunity to ask questions or voice concerns, which he declined, while his wife voiced several concerns which we attempted to address as best as possible.    We appreciate being involved in this special case and will continue to follow up with the patient again tomorrow.  Past Medical History  He has a past medical history of At risk for falling, Benign prostatic hyperplasia without lower urinary tract symptoms, Cognitive decline, Depression, Gait instability, GERD (gastroesophageal reflux disease), Hydrocephalus, Memory impairment, Paresthesias, Personal history of other diseases of the musculoskeletal system and connective tissue, Small bowel obstruction (Multi), Urinary incontinence, and Weakness.    Surgical History  He has a past surgical history that includes Other surgical history (07/22/2019) and Colon surgery.     Social History  He reports that he has quit smoking. His smoking use included cigarettes. He has never used smokeless tobacco. He reports that he does not currently use alcohol. He reports that he does not use drugs.     Allergies  Patient has no known allergies.    Review of Systems    Psychiatric ROS - Adult  Anxiety: Negative  Depression: negative  Delirium: acute inattention, change in speech, disorientation, sleep-wake  "abnormal, and waxing and waning  Psychosis: negative  Anna: negative  Safety Issues:  Confusion  Psychiatric ROS Comment: As noted    Physical Exam    Involuntary Movement Assessment  Facial and Oral Movements  Symmetrical facial expressions  Extremity Movements  No involuntary movents or tremors  Trunk Movements   No rigidity or abnormal posturing      Mental Status Examination  General Appearance: Fairly groomed. Hospital attire Restless  Gait/Station: Within normal limits  Speech: Slow speech, normal tone  Mood: \"Ok\"  Affect: Congruent with mood and topic of conversation  Thought Process: Tangential  Thought Associations: Severe loosening of associations and Occasional derailment  Thought Content: tangential  Perception: No perceptual abnormalities noted  Level of Consciousness:  Altered and confused  Orientation: Not oriented to place, time/date, situation, day of week, month of year, and year  Attention and Concentration: Unable to assess due to  patient’s confusion, we are unable to effectively assess their attention and concentration at this time.     Recent Memory: Impaired as evidenced by difficulty recalling details from the past 24 hours  and Impaired as evidenced by confabulation   Remote Memory: Impaired as evidenced by difficulty recalling previous medical issues , Impaired as evidenced by difficulty recalling events from childhood , and Impaired as evidenced by confabulation   Executive function: Impaired as evidenced by Inattention and poor impulse control due to confusion   Language: Word finding difficulties  Fund of knowledge: Severely limited  Insight: Severely impaired, as evidenced by inability to understand and lack of awareness    Judgment: Limited, as evidence by inability to reason through medical decision making and diminished ability to reason    Psychiatric Risk Assessment  Violence Risk Assessment: male and other dementia  Acute Risk of Harm to Others is Considered: low and moderate " "  Suicide Risk Assessment: age > 65 yrs old, , and male  Protective Factors against Suicide: positive family relationships  Acute Risk of Harm to Self is Considered: low    Last Recorded Vitals  Blood pressure 114/60, pulse 82, temperature 36.3 °C (97.3 °F), temperature source Oral, resp. rate 16, height 1.778 m (5' 10\"), weight 81.6 kg (180 lb), SpO2 97%.    Relevant Results    Scheduled medications  busPIRone, 7.5 mg, oral, BID  cefTRIAXone, 1 g, intravenous, q24h  cholecalciferol, 2,000 Units, oral, Daily  docusate sodium, 100 mg, oral, BID  enoxaparin, 40 mg, subcutaneous, Daily  fluticasone, 1 spray, Each Nostril, Daily  gabapentin, 300 mg, oral, Nightly  melatonin, 3 mg, oral, Daily  mirtazapine, 15 mg, oral, Nightly  nystatin, 1 Application, Topical, BID  OLANZapine zydis, 5 mg, oral, Nightly  oxybutynin, 5 mg, oral, TID  pantoprazole, 40 mg, oral, Daily  polyethylene glycol, 17 g, oral, Daily      Continuous medications  potassium [id number]-N7-6.45%NaCl, 75 mL/hr, Last Rate: 75 mL/hr (03/03/25 1716)      PRN medications  PRN medications: acetaminophen **OR** acetaminophen **OR** acetaminophen, benzocaine-menthol, bisacodyl, dextromethorphan-guaifenesin, guaiFENesin, OLANZapine **OR** OLANZapine, ondansetron ODT     Results for orders placed or performed during the hospital encounter of 03/03/25 (from the past 96 hours)   CBC and Auto Differential   Result Value Ref Range    WBC 7.0 4.4 - 11.3 x10*3/uL    nRBC 0.0 0.0 - 0.0 /100 WBCs    RBC 3.85 (L) 4.50 - 5.90 x10*6/uL    Hemoglobin 12.3 (L) 13.5 - 17.5 g/dL    Hematocrit 36.1 (L) 41.0 - 52.0 %    MCV 94 80 - 100 fL    MCH 31.9 26.0 - 34.0 pg    MCHC 34.1 32.0 - 36.0 g/dL    RDW 13.2 11.5 - 14.5 %    Platelets 271 150 - 450 x10*3/uL    Neutrophils % 65.2 40.0 - 80.0 %    Immature Granulocytes %, Automated 0.7 0.0 - 0.9 %    Lymphocytes % 19.9 13.0 - 44.0 %    Monocytes % 9.4 2.0 - 10.0 %    Eosinophils % 4.1 0.0 - 6.0 %    Basophils % 0.7 0.0 - 2.0 % "    Neutrophils Absolute 4.56 1.60 - 5.50 x10*3/uL    Immature Granulocytes Absolute, Automated 0.05 0.00 - 0.50 x10*3/uL    Lymphocytes Absolute 1.39 0.80 - 3.00 x10*3/uL    Monocytes Absolute 0.66 0.05 - 0.80 x10*3/uL    Eosinophils Absolute 0.29 0.00 - 0.40 x10*3/uL    Basophils Absolute 0.05 0.00 - 0.10 x10*3/uL   Magnesium   Result Value Ref Range    Magnesium 2.10 1.60 - 2.40 mg/dL   Comprehensive metabolic panel   Result Value Ref Range    Glucose 90 74 - 99 mg/dL    Sodium 138 136 - 145 mmol/L    Potassium 4.0 3.5 - 5.3 mmol/L    Chloride 106 98 - 107 mmol/L    Bicarbonate 28 21 - 32 mmol/L    Anion Gap 8 (L) 10 - 20 mmol/L    Urea Nitrogen 10 6 - 23 mg/dL    Creatinine 0.84 0.50 - 1.30 mg/dL    eGFR 83 >60 mL/min/1.73m*2    Calcium 8.7 8.6 - 10.3 mg/dL    Albumin 3.3 (L) 3.4 - 5.0 g/dL    Alkaline Phosphatase 78 33 - 136 U/L    Total Protein 5.1 (L) 6.4 - 8.2 g/dL    AST 21 9 - 39 U/L    Bilirubin, Total 0.5 0.0 - 1.2 mg/dL    ALT 20 10 - 52 U/L   Coagulation Screen   Result Value Ref Range    Protime 10.7 9.3 - 12.7 seconds    INR 1.0 0.9 - 1.2    aPTT 25.8 22.0 - 32.5 seconds   Lipase   Result Value Ref Range    Lipase 32 9 - 82 U/L   B-Type Natriuretic Peptide   Result Value Ref Range     (H) 0 - 99 pg/mL   Troponin I, High Sensitivity, Initial   Result Value Ref Range    Troponin I, High Sensitivity 11 0 - 20 ng/L   Sars-CoV-2 and Influenza A/B PCR   Result Value Ref Range    Flu A Result Not Detected Not Detected    Flu B Result Not Detected Not Detected    Coronavirus 2019, PCR Not Detected Not Detected   RSV PCR   Result Value Ref Range    RSV PCR Not Detected Not Detected   Urinalysis with Reflex Culture and Microscopic   Result Value Ref Range    Color, Urine Light-Yellow Light-Yellow, Yellow, Dark-Yellow    Appearance, Urine Turbid (N) Clear    Specific Gravity, Urine 1.009 1.005 - 1.035    pH, Urine 6.5 5.0, 5.5, 6.0, 6.5, 7.0, 7.5, 8.0    Protein, Urine NEGATIVE NEGATIVE, 10 (TRACE), 20  (TRACE) mg/dL    Glucose, Urine Normal Normal mg/dL    Blood, Urine 0.03 (TRACE) (A) NEGATIVE mg/dL    Ketones, Urine NEGATIVE NEGATIVE mg/dL    Bilirubin, Urine NEGATIVE NEGATIVE mg/dL    Urobilinogen, Urine Normal Normal mg/dL    Nitrite, Urine NEGATIVE NEGATIVE    Leukocyte Esterase, Urine 500 Manjinder/uL (A) NEGATIVE   Extra Urine Gray Tube   Result Value Ref Range    Extra Tube Hold for add-ons.    Microscopic Only, Urine   Result Value Ref Range    WBC, Urine 21-50 (A) 1-5, NONE /HPF    RBC, Urine 11-20 (A) NONE, 1-2, 3-5 /HPF    Bacteria, Urine 1+ (A) NONE SEEN /HPF   Urine Culture    Specimen: Clean Catch/Voided; Urine   Result Value Ref Range    Urine Culture Culture in progress    Troponin, High Sensitivity, 1 Hour   Result Value Ref Range    Troponin I, High Sensitivity 11 0 - 20 ng/L   CBC   Result Value Ref Range    WBC 7.5 4.4 - 11.3 x10*3/uL    nRBC 0.0 0.0 - 0.0 /100 WBCs    RBC 3.94 (L) 4.50 - 5.90 x10*6/uL    Hemoglobin 12.6 (L) 13.5 - 17.5 g/dL    Hematocrit 36.7 (L) 41.0 - 52.0 %    MCV 93 80 - 100 fL    MCH 32.0 26.0 - 34.0 pg    MCHC 34.3 32.0 - 36.0 g/dL    RDW 13.0 11.5 - 14.5 %    Platelets 276 150 - 450 x10*3/uL   Comprehensive metabolic panel   Result Value Ref Range    Glucose 103 (H) 74 - 99 mg/dL    Sodium 138 136 - 145 mmol/L    Potassium 4.4 3.5 - 5.3 mmol/L    Chloride 105 98 - 107 mmol/L    Bicarbonate 29 21 - 32 mmol/L    Anion Gap 8 (L) 10 - 20 mmol/L    Urea Nitrogen 12 6 - 23 mg/dL    Creatinine 0.99 0.50 - 1.30 mg/dL    eGFR 73 >60 mL/min/1.73m*2    Calcium 8.7 8.6 - 10.3 mg/dL    Albumin 3.1 (L) 3.4 - 5.0 g/dL    Alkaline Phosphatase 80 33 - 136 U/L    Total Protein 5.4 (L) 6.4 - 8.2 g/dL    AST 17 9 - 39 U/L    Bilirubin, Total 0.4 0.0 - 1.2 mg/dL    ALT 18 10 - 52 U/L      Med list and pertinent labs reviewed.      Assessment/Plan   Assessment & Plan  Acute cystitis without hematuria    Gait instability    Recurrent falls    Memory impairment      1) Agitation  Likely due to  delirium superimposed on underlying cognitive impairment.      Plan:  - Order olanzapine 2.5 mg PO/ 2.5mg IM Q6H PRN for agitation  - Give melatonin 3 mg at 1800 to aid sleep-wake cycle  - Continue busPIRone (Buspar) tablet 7.5 mg, PO 2 times daily, at bedtime  for anxiety. (Patients own meds)    - Continue mirtazapine (Remeron) tablet 15 mg, PO, Nightly, for post traumatic stress disorder    - Continue Zyprexa Zydis 5 mg PO at bedtime  to help with behavioral disturbances and impulse control.    - Recommend diligence in correcting any metabolic or infectious derangements that can contribute to/worsen encephalopathy and delirium.     - Recommend STRICT delirium precautions:    -- Minimize use of benzos, opiates, anticholinergics, as these may worsen mental status   -- Would use caution with narcotic pain medications   -- Would still adequately controlling pain, as uncontrolled pain is also a risk factor for delirium   -- Reinforce sleep hygiene; encourage patient to stay awake during the day   -- Keep curtains/blinds open during the day and closed at night.   -- Would recommend reorienting/redirecting patient as much as possible,    -- Aim for consistent staffing, familiar objects, avoiding bright lights and loud noises, etc    The patient currently does not meet the criteria for the inpatient psychiatric treatment.     Thank you for allowing us to participate in the care of this patient. We will continue to follow the patient while they are admitted.      Medication Consent  Medication Consent: risks, benefits, side effects reviewed for all ordered meds    I personally spent 75 minutes today providing care for this patient, including preparation, face to face time, documentation and other services such as review of medical records, diagnostic result, collaboration with primary team and providers, patient education, counseling, coordination of care as specified in the encounter.      Parts of this chart have been  completed using voice recognition software.  Please excuse any errors of transcription.  Despite the medical decision making time stamp, my medical decision making has taken place during the patient's entire visit.  Thought process and reason for plan has been formulated from the time that I saw the patient until the time of disposition and is not specific to one specific moment during their visit and furthermore the medical decision making encompasses the entire chart and not only that represented in this note.     JOSE Diaz-BC  Psychiatry, [facility]  1* contact: [employer] preferred

## 2025-03-04 NOTE — CARE PLAN
The patient's goals for the shift include Help with adls    The clinical goals for the shift include pt will remain safe and free from injury

## 2025-03-04 NOTE — NURSING NOTE
Pt A&O to self  currently resting in bed. No complaints or concerns. Call light within reach.  Bed alarm in place for pt safety.  Pt refusing tele atthis time. Pt has no IV access due to removing several last night.

## 2025-03-04 NOTE — CARE PLAN
The patient's goals for the shift include Help with adls    The clinical goals for the shift include pt will remain safe and free from injury        Problem: Pain - Adult  Goal: Verbalizes/displays adequate comfort level or baseline comfort level  Outcome: Progressing     Problem: Safety - Adult  Goal: Free from fall injury  Outcome: Progressing     Problem: Discharge Planning  Goal: Discharge to home or other facility with appropriate resources  Outcome: Progressing     Problem: Chronic Conditions and Co-morbidities  Goal: Patient's chronic conditions and co-morbidity symptoms are monitored and maintained or improved  Outcome: Progressing     Problem: Nutrition  Goal: Nutrient intake appropriate for maintaining nutritional needs  Outcome: Progressing     Problem: Fall/Injury  Goal: Not fall by end of shift  Outcome: Progressing  Goal: Be free from injury by end of the shift  Outcome: Progressing  Goal: Verbalize understanding of personal risk factors for fall in the hospital  Outcome: Progressing  Goal: Verbalize understanding of risk factor reduction measures to prevent injury from fall in the home  Outcome: Progressing  Goal: Use assistive devices by end of the shift  Outcome: Progressing  Goal: Pace activities to prevent fatigue by end of the shift  Outcome: Progressing

## 2025-03-04 NOTE — PROGRESS NOTES
Occupational Therapy    Evaluation    Patient Name: Enmanuel Bhatia  MRN: [id number]  Department: [facility]  Room: [facility]431-A  Today's Date: 3/4/2025  Time Calculation  Start Time: 1038  Stop Time: 1055  Time Calculation (min): 17 min        Assessment:  OT Assessment: pt presents with generalized weakness, confusion, impaired motor planning/sequencing and decreased balance which impedes ADL performance. pt would benefit from skilled OT services to address these deficits and to facilitate highest level of independence.  Prognosis: Fair  Barriers to Discharge Home: Cognition needs, Physical needs  Cognition Needs: 24hr supervision for safety awareness needed  Physical Needs: 24hr mobility assistance needed, 24hr ADL assistance needed  Evaluation/Treatment Tolerance: Patient limited by fatigue  Medical Staff Made Aware: Yes  End of Session Communication: Bedside nurse  End of Session Patient Position: Bed, 3 rail up, Alarm on  OT Assessment Results: Decreased ADL status, Decreased safe judgment during ADL, Decreased cognition, Decreased endurance, Decreased functional mobility, Decreased gross motor control, Decreased fine motor control  Prognosis: Fair  Evaluation/Treatment Tolerance: Patient limited by fatigue  Medical Staff Made Aware: Yes  Strengths: Attitude of self  Barriers to Participation: Ability to acquire knowledge, Comorbidities  Plan:  Treatment Interventions: ADL retraining, Functional transfer training, UE strengthening/ROM, Endurance training, Equipment evaluation/education, Compensatory technique education  OT Frequency: 3 times per week  OT Discharge Recommendations: Moderate intensity level of continued care, 24 hr supervision due to cognition  Equipment Recommended upon Discharge: Wheeled walker  OT Recommended Transfer Status: Assist of 1, Moderate assist  OT - OK to Discharge: Yes  Treatment Interventions: ADL retraining, Functional transfer training, UE strengthening/ROM, Endurance training,  Equipment evaluation/education, Compensatory technique education    Subjective     General:  General  Reason for Referral: ADL impairment; 89 y/o male admitted with acute cystitis without hematuria; frequent falls  Past Medical History Relevant to Rehab: osteopenia; dementia; falls; BPH; GERD: depression; urinary incontinence  Family/Caregiver Present: No  Prior to Session Communication: Bedside nurse  Patient Position Received: Up in chair, Alarm on  Preferred Learning Style: verbal, visual  General Comment: pt pleasantly confused, tangential at times with decreased sustained attention  Precautions:  Hearing/Visual Limitations: no glasses; moderate [place]  Medical Precautions: Fall precautions            Pain:  Pain Assessment  Pain Assessment: 0-10  0-10 (Numeric) Pain Score: 0 - No pain    Objective   Cognition:  Overall Cognitive Status: Impaired at baseline  Orientation Level: Disoriented to place, Disoriented to time, Disoriented to situation  Following Commands: Follows one step commands with increased time  Cognition Comments: pleasantly confused, poor historian,  Attention: Exceptions to WFL  Sustained Attention: Impaired  Memory: Exceptions to WFL  Long-Term Memory: Impaired  Short-Term Memory: Impaired  Insight: Moderate  Processing Speed: Delayed           Home Living:  Type of Home: House  Lives With: Spouse (grandson)  Home Adaptive Equipment: Walker rolling or standard  Home Layout: One level, Laundry in basement  Home Access: Stairs to enter with rails  Entrance Stairs-Rails:  (single)  Entrance Stairs-Number of Steps: 3  Bathroom Shower/Tub: Walk-in shower  Bathroom Toilet: Adaptive toilet seating  Bathroom Equipment: Grab bars in shower, Tub transfer bench  Home Living Comments: pt also has 12 stairs with 1 railing to basement laundry.  pt poor historian, information taken from previous chart  Prior Function:  Level of [place]: Needs assistance with ADLs, Needs assistance with  homemaking  Receives Help From: Family  ADL Assistance: Needs assistance  Homemaking Assistance: Needs assistance  Ambulatory Assistance: Independent (supervision with FWW)  Vocational: Retired  Prior Function Comments: poor historian; anticipating requiring assistance/supervision at baseline due to cognitive decline     ADL:  Eating Assistance:  (set up; seated in chair, moderate spillage noted)  Grooming Assistance: Minimal (anticipated)  Bathing Assistance: Maximal (anticipated)  UE Dressing Assistance: Moderate (anticipated)  LE Dressing Assistance: Maximal (anticipated)  Toileting Assistance with Device: Maximal (anticipated)  ADL Comments: pt with significant confusion and cognitive deficits, decreased sequencing/problem solving at tasks  Activity Tolerance:  Endurance: Decreased tolerance for upright activites  Bed Mobility/Transfers: Bed Mobility  Bed Mobility: Yes  Bed Mobility 1  Bed Mobility 1: Sitting to supine  Level of Assistance 1: Moderate assistance  Bed Mobility Comments 1: MOD A for managing BLE back into bed and for lowering/guiding trunk; effortful    Transfers  Transfer: Yes  Transfer 1  Technique 1: Sit to stand, Stand to sit  Transfer Device 1: Walker  Transfer Level of Assistance 1: Moderate assistance  Trials/Comments 1: from standard chair, cues for hand placement and positioning.  x3 stands were performed during session all requiring MOD A to ascend into standing position due to BLE weakness and retropulsion.  Transfers 2  Transfer From 2: Chair with arms to  Transfer to 2: Bed  Technique 2: Stand pivot  Transfer Device 2: Walker  Transfer Level of Assistance 2: Moderate assistance  Trials/Comments 2: MOD A for steady assist during pivot, required max cueing for safe walker management due to patient attempting to push walker away and reach out for support on the bed.  required [place] assist for pivoting walker during transfer    Sitting Balance:  Static Sitting Balance  Static  Sitting-Balance Support: Feet supported  Static Sitting-Level of Assistance: Close supervision  Standing Balance:  Static Standing Balance  Static Standing-Balance Support: Bilateral upper extremity supported  Static Standing-Level of Assistance: Moderate assistance  Static Standing-Comment/Number of Minutes: heavy reliance on BUE for support, able to tolerate standing for ~1 minute total      Vision:Vision - Basic Assessment  Current Vision: No visual deficits  Sensation:  Sensation Comment: c/o numbness B wrist and hands  Strength:  Strength Comments: BUE grossly 3+/5  Perception:  Inattention/Neglect: Appears intact  Coordination:  Movements are Fluid and Coordinated: No  Upper Body Coordination: impaired motor planning; decreased coordination noted   Hand Function:  Gross Grasp: Functional  Coordination: Impaired  Extremities: RUE   RUE : Within Functional Limits and LUE   LUE: Within Functional Limits    Outcome Measures:[facility] Daily Activity  Putting on and taking off regular lower body clothing: A lot  Bathing (including washing, rinsing, drying): A lot  Putting on and taking off regular upper body clothing: A lot  Toileting, which includes using toilet, bedpan or urinal: A lot  Taking care of personal grooming such as brushing teeth: A little  Eating Meals: A little  Daily Activity - Total Score: 14        Education Documentation  Body Mechanics, taught by Herminio Leon OT at 3/4/2025 11:34 AM.  Learner: Patient  Readiness: Acceptance  Method: Explanation, Demonstration  Response: No Evidence of Learning  Comment: ADL/functional mobility techniques, benefits of OOB activity, OT POC    ADL Training, taught by Herminio Leon OT at 3/4/2025 11:34 AM.  Learner: Patient  Readiness: Acceptance  Method: Explanation, Demonstration  Response: No Evidence of Learning  Comment: ADL/functional mobility techniques, benefits of OOB activity, OT POC    Education Comments  No comments found.        OP EDUCATION:        Goals:  Encounter Problems       Encounter Problems (Active)       ADLs       Patient with complete upper body dressing with minimal assist  level of assistance donning and doffing all UE clothes while edge of bed  (Progressing)       Start:  03/04/25    Expected End:  04/04/25            Patient with complete lower body dressing with minimal assist  level of assistance donning and doffing all LE clothes  with PRN adaptive equipment while edge of bed  (Progressing)       Start:  03/04/25    Expected End:  04/04/25            Patient will complete daily grooming tasks with supervision level of assistance and PRN adaptive equipment while standing. (Progressing)       Start:  03/04/25    Expected End:  04/04/25            Patient will complete toileting including hygiene clothing management/hygiene with minimal assist  level of assistance and raised toilet seat and grab bars. (Progressing)       Start:  03/04/25    Expected End:  04/04/25               TRANSFERS       Patient will complete functional transfer to toilet/commode with least restrictive device with contact guard assist level of assistance. (Progressing)       Start:  03/04/25    Expected End:  04/04/25

## 2025-03-04 NOTE — H&P
History Of Present Illness  Enmanuel Bahtia is a 89 y.o. male presenting with frequent falls and confusion.  Patient has past medical history of normal pressure hydrocephalus, BPH, gait instability, GERD, memory impairment/dementia, history of small bowel obstruction, quit smoking long time ago, lives with wife.  Patient is not a good historian.  In the ER found to have UTI and may need long-term placement     Past Medical History  He has a past medical history of At risk for falling, Benign prostatic hyperplasia without lower urinary tract symptoms, Cognitive decline, Depression, Gait instability, GERD (gastroesophageal reflux disease), Hydrocephalus, Memory impairment, Paresthesias, Personal history of other diseases of the musculoskeletal system and connective tissue, Small bowel obstruction (Multi), Urinary incontinence, and Weakness.    Surgical History  He has a past surgical history that includes Other surgical history (07/22/2019) and Colon surgery.     Social History  He reports that he has quit smoking. His smoking use included cigarettes. He has never used smokeless tobacco. He reports that he does not currently use alcohol. He reports that he does not use drugs.    Family History  No family history on file.     Allergies  Patient has no known allergies.    Review of Systems  Poor historian  Physical Exam  Vitals reviewed.   Constitutional:       Appearance: Normal appearance.   HENT:      Head: Normocephalic and atraumatic.      Right Ear: Tympanic membrane, ear canal and external ear normal.      Left Ear: Tympanic membrane, ear canal and external ear normal.      Nose: Nose normal.      Mouth/Throat:      Pharynx: Oropharynx is clear.   Eyes:      Extraocular Movements: Extraocular movements intact.      Conjunctiva/sclera: Conjunctivae normal.      Pupils: Pupils are equal, round, and reactive to light.   Cardiovascular:      Rate and Rhythm: Normal rate and regular rhythm.      Pulses: Normal pulses.       Heart sounds: Normal heart sounds.   Pulmonary:      Effort: Pulmonary effort is normal.      Breath sounds: Normal breath sounds.   Abdominal:      General: Abdomen is flat. Bowel sounds are normal.      Palpations: Abdomen is soft.   Musculoskeletal:      Cervical back: Normal range of motion and neck supple.   Skin:     General: Skin is warm and dry.   Neurological:      General: No focal deficit present.      Mental Status: He is alert.   Psychiatric:         Mood and Affect: Mood normal.          Last Recorded Vitals  /79 (BP Location: Right arm, Patient Position: Lying)   Pulse 70   Temp 36.8 °C (98.2 °F) (Oral)   Resp 18   Wt 81.6 kg (180 lb)   SpO2 98%     Relevant Results        Scheduled medications  busPIRone, 7.5 mg, oral, BID  cholecalciferol, 2,000 Units, oral, Daily  docusate sodium, 100 mg, oral, BID  enoxaparin, 40 mg, subcutaneous, Daily  [START ON 3/4/2025] fluticasone, 1 spray, Each Nostril, Daily  gabapentin, 300 mg, oral, Nightly  mirtazapine, 15 mg, oral, Nightly  nystatin, 1 Application, Topical, BID  OLANZapine zydis, 5 mg, oral, Nightly  oxybutynin, 5 mg, oral, TID  pantoprazole, 40 mg, oral, Daily  polyethylene glycol, 17 g, oral, Daily      Continuous medications  potassium tsedxeu-C2-8.45%NaCl, 75 mL/hr, Last Rate: 75 mL/hr (03/03/25 1716)      PRN medications  PRN medications: acetaminophen **OR** acetaminophen **OR** acetaminophen, benzocaine-menthol, bisacodyl, dextromethorphan-guaifenesin, guaiFENesin, melatonin, OLANZapine, ondansetron ODT  Results for orders placed or performed during the hospital encounter of 03/03/25 (from the past 24 hours)   CBC and Auto Differential   Result Value Ref Range    WBC 7.0 4.4 - 11.3 x10*3/uL    nRBC 0.0 0.0 - 0.0 /100 WBCs    RBC 3.85 (L) 4.50 - 5.90 x10*6/uL    Hemoglobin 12.3 (L) 13.5 - 17.5 g/dL    Hematocrit 36.1 (L) 41.0 - 52.0 %    MCV 94 80 - 100 fL    MCH 31.9 26.0 - 34.0 pg    MCHC 34.1 32.0 - 36.0 g/dL    RDW 13.2 11.5 -  14.5 %    Platelets 271 150 - 450 x10*3/uL    Neutrophils % 65.2 40.0 - 80.0 %    Immature Granulocytes %, Automated 0.7 0.0 - 0.9 %    Lymphocytes % 19.9 13.0 - 44.0 %    Monocytes % 9.4 2.0 - 10.0 %    Eosinophils % 4.1 0.0 - 6.0 %    Basophils % 0.7 0.0 - 2.0 %    Neutrophils Absolute 4.56 1.60 - 5.50 x10*3/uL    Immature Granulocytes Absolute, Automated 0.05 0.00 - 0.50 x10*3/uL    Lymphocytes Absolute 1.39 0.80 - 3.00 x10*3/uL    Monocytes Absolute 0.66 0.05 - 0.80 x10*3/uL    Eosinophils Absolute 0.29 0.00 - 0.40 x10*3/uL    Basophils Absolute 0.05 0.00 - 0.10 x10*3/uL   Magnesium   Result Value Ref Range    Magnesium 2.10 1.60 - 2.40 mg/dL   Comprehensive metabolic panel   Result Value Ref Range    Glucose 90 74 - 99 mg/dL    Sodium 138 136 - 145 mmol/L    Potassium 4.0 3.5 - 5.3 mmol/L    Chloride 106 98 - 107 mmol/L    Bicarbonate 28 21 - 32 mmol/L    Anion Gap 8 (L) 10 - 20 mmol/L    Urea Nitrogen 10 6 - 23 mg/dL    Creatinine 0.84 0.50 - 1.30 mg/dL    eGFR 83 >60 mL/min/1.73m*2    Calcium 8.7 8.6 - 10.3 mg/dL    Albumin 3.3 (L) 3.4 - 5.0 g/dL    Alkaline Phosphatase 78 33 - 136 U/L    Total Protein 5.1 (L) 6.4 - 8.2 g/dL    AST 21 9 - 39 U/L    Bilirubin, Total 0.5 0.0 - 1.2 mg/dL    ALT 20 10 - 52 U/L   Coagulation Screen   Result Value Ref Range    Protime 10.7 9.3 - 12.7 seconds    INR 1.0 0.9 - 1.2    aPTT 25.8 22.0 - 32.5 seconds   Lipase   Result Value Ref Range    Lipase 32 9 - 82 U/L   B-Type Natriuretic Peptide   Result Value Ref Range     (H) 0 - 99 pg/mL   Troponin I, High Sensitivity, Initial   Result Value Ref Range    Troponin I, High Sensitivity 11 0 - 20 ng/L   Sars-CoV-2 and Influenza A/B PCR   Result Value Ref Range    Flu A Result Not Detected Not Detected    Flu B Result Not Detected Not Detected    Coronavirus 2019, PCR Not Detected Not Detected   RSV PCR   Result Value Ref Range    RSV PCR Not Detected Not Detected   Urinalysis with Reflex Culture and Microscopic   Result  Value Ref Range    Color, Urine Light-Yellow Light-Yellow, Yellow, Dark-Yellow    Appearance, Urine Turbid (N) Clear    Specific Gravity, Urine 1.009 1.005 - 1.035    pH, Urine 6.5 5.0, 5.5, 6.0, 6.5, 7.0, 7.5, 8.0    Protein, Urine NEGATIVE NEGATIVE, 10 (TRACE), 20 (TRACE) mg/dL    Glucose, Urine Normal Normal mg/dL    Blood, Urine 0.03 (TRACE) (A) NEGATIVE mg/dL    Ketones, Urine NEGATIVE NEGATIVE mg/dL    Bilirubin, Urine NEGATIVE NEGATIVE mg/dL    Urobilinogen, Urine Normal Normal mg/dL    Nitrite, Urine NEGATIVE NEGATIVE    Leukocyte Esterase, Urine 500 Manjinder/uL (A) NEGATIVE   Extra Urine Gray Tube   Result Value Ref Range    Extra Tube Hold for add-ons.    Microscopic Only, Urine   Result Value Ref Range    WBC, Urine 21-50 (A) 1-5, NONE /HPF    RBC, Urine 11-20 (A) NONE, 1-2, 3-5 /HPF    Bacteria, Urine 1+ (A) NONE SEEN /HPF   Troponin, High Sensitivity, 1 Hour   Result Value Ref Range    Troponin I, High Sensitivity 11 0 - 20 ng/L     CT head wo IV contrast    Result Date: 3/3/2025  Interpreted By:  Mitch Davis, STUDY: CT HEAD WO IV CONTRAST; CT CERVICAL SPINE WO IV CONTRAST;  3/3/2025 12:02 pm   INDICATION: Trauma. Signs/Symptoms:Falls.     COMPARISON: July 15, 2022 PET-CT, February 13, 2024 MRI brain, January 24, 2023 CT cervical spine.   ACCESSION NUMBER(S): [id number]; [id number]   ORDERING CLINICIAN: ALEKSANDR CAMPBELL   TECHNIQUE: Axial noncontrast head and cervical spine CT   FINDINGS: Head:   The ventricles, cisterns and sulci are prominent, consistent with mild diffuse volume loss with disproportionate dilation of the ventricles . There are areas of nonspecific white matter hypodensity, which are probably age-related or microvascular in nature.   Gray-white differentiation is intact and there is no evidence of acute cortical infarct. No mass, mass effect or midline shift is seen. There is no evidence of hemorrhage.   The visualized paranasal sinuses are clear.   Calvarium: No acute fracture.    Cervical spine:   Alignment:   Similar alignment with about 1-2 mm C4 anterolisthesis. Asymmetry of the C1-2 facet joints likely related to difference in patient position.   Fracture: No acute fracture.   Vertebra and intervertebral disc spaces: Similar advanced multilevel degenerative changes.   Craniocervical junction: No evident fracture or dissociation   Paravertebral soft tissues: No paravertebral hematoma. Trace arterial vascular calcifications.   Brain Injury (BIG) guidelines CT values:   Skull fracture: No SDH (subdural hematoma): None detected EDH (epidural hemtoma): None detected IPH (intraparenchymal hemorrhage): None detected SAH (subarachnoid hemorrhage): None detected IVH (intraventricular hemorrhage): No   Reference: Willie MARTIN, Sandy RS, Elaine M, et al. The BIG (brain injury guidelines) project: defining the management of traumatic brain injury by acute care surgeons. J Trauma Acute Care Surg. 2014;76:966c020.       Head: No acute intracranial abnormality or calvarial fracture was identified.   Similar disproportionate central atrophy and nonspecific low-density white matter changes.   Cervical spine: No fracture or interval subluxation.   Asymmetry of the C1-2 facet joints likely positional.   Similar advanced degenerative changes and mild arterial vascular calcifications.   MACRO: None     Signed by: Mitch Davis 3/3/2025 12:20 PM Dictation workstation:   [id number]    CT cervical spine wo IV contrast    Result Date: 3/3/2025  Interpreted By:  Mitch Davis, STUDY: CT HEAD WO IV CONTRAST; CT CERVICAL SPINE WO IV CONTRAST;  3/3/2025 12:02 pm   INDICATION: Trauma. Signs/Symptoms:Falls.     COMPARISON: July 15, 2022 PET-CT, February 13, 2024 MRI brain, January 24, 2023 CT cervical spine.   ACCESSION NUMBER(S): [id number]; [id number]   ORDERING CLINICIAN: ALEKSANDR CAMPBELL   TECHNIQUE: Axial noncontrast head and cervical spine CT   FINDINGS: Head:   The ventricles, cisterns and sulci are  prominent, consistent with mild diffuse volume loss with disproportionate dilation of the ventricles . There are areas of nonspecific white matter hypodensity, which are probably age-related or microvascular in nature.   Gray-white differentiation is intact and there is no evidence of acute cortical infarct. No mass, mass effect or midline shift is seen. There is no evidence of hemorrhage.   The visualized paranasal sinuses are clear.   Calvarium: No acute fracture.   Cervical spine:   Alignment:   Similar alignment with about 1-2 mm C4 anterolisthesis. Asymmetry of the C1-2 facet joints likely related to difference in patient position.   Fracture: No acute fracture.   Vertebra and intervertebral disc spaces: Similar advanced multilevel degenerative changes.   Craniocervical junction: No evident fracture or dissociation   Paravertebral soft tissues: No paravertebral hematoma. Trace arterial vascular calcifications.   Brain Injury (BIG) guidelines CT values:   Skull fracture: No SDH (subdural hematoma): None detected EDH (epidural hemtoma): None detected IPH (intraparenchymal hemorrhage): None detected SAH (subarachnoid hemorrhage): None detected IVH (intraventricular hemorrhage): No   Reference: Willie MARTIN, Sandy RS, Elaine M, et al. The BIG (brain injury guidelines) project: defining the management of traumatic brain injury by acute care surgeons. J Trauma Acute Care Surg. 2014;76:767w935.       Head: No acute intracranial abnormality or calvarial fracture was identified.   Similar disproportionate central atrophy and nonspecific low-density white matter changes.   Cervical spine: No fracture or interval subluxation.   Asymmetry of the C1-2 facet joints likely positional.   Similar advanced degenerative changes and mild arterial vascular calcifications.   MACRO: None     Signed by: Mitch Davis 3/3/2025 12:20 PM Dictation workstation:   [id number]    XR pelvis 1-2 views    Result Date: 3/3/2025  Interpreted By:   Umer Amos and Sullivan Shannon STUDY: XR PELVIS 1-2 VIEWS; ;  3/3/2025 11:23 am   INDICATION: Signs/Symptoms:Falls.     COMPARISON: CT chest abdomen pelvis 03/31/2021   ACCESSION NUMBER(S): [id number]   ORDERING CLINICIAN: ALEKSANDR CAMPBELL   FINDINGS: Single frontal view of the pelvis was provided. A tubular structure projects over the right hip.   Diffuse osteopenia.   No evidence of acute fracture or dislocation.   The pelvic ring, visualized sacral arcuate lines, ilioischial and iliopectineal lines are preserved. The pubic symphysis appears intact.   Severe degenerative changes of the lower lumbar spine. Moderate to severe bilateral hip osteoarthrosis.   Radiodensity projecting over the left proximal femur corresponds to a heterotopic calcification seen on prior cross-sectional imaging.       No acute osseous abnormality.   I personally reviewed the images/study and I agree with the findings as stated by Radiology resident.   MACRO: None   Signed by: Umer Amos 3/3/2025 12:17 PM Dictation workstation:   [id number]    XR chest 1 view    Result Date: 3/3/2025  Interpreted By:  Umer Amos and Sullivan Shannon STUDY: XR CHEST 1 VIEW;  3/3/2025 11:23 am   INDICATION: Signs/Symptoms:Fall.   COMPARISON: Chest radiograph 02/03/2025   ACCESSION NUMBER(S): [id number]   ORDERING CLINICIAN: ALEKSANDR CAMPBELL   FINDINGS: Frontal radiograph of the chest was provided       CARDIOMEDIASTINAL SILHOUETTE: Cardiac silhouette is stable in size.Atherosclerotic calcifications of the aortic knob. Similar rounded right hilar prominence, corresponding to prominent right pulmonary vasculature seen on prior cross-sectional imaging.   LUNGS: Mild interstitial prominence diffusely. No focal consolidation, pleural effusion, or pneumothorax.   ABDOMEN: No remarkable upper abdominal findings.   BONES: No acute osseous changes.       Mild nonspecific interstitial prominence diffusely. No focal infiltrates.     I personally reviewed  the images/study and I agree with the findings as stated by Radiology resident.   MACRO: None   Signed by: Umer Amos 3/3/2025 12:16 PM Dictation workstation:   [id number]        Assessment/Plan   Assessment & Plan  Acute cystitis without hematuria    Gait instability    Memory impairment    Recurrent falls      Start Rocephin  Wait for the culture to narrow down the antibiotic coverage  Continue home medications  Psych input  PT OT   for placement       Aicha Muñoz MD

## 2025-03-04 NOTE — PROGRESS NOTES
"Enmanuel Bhatia is a 89 y.o. male on day 0 of admission presenting with Acute cystitis without hematuria.    Subjective   Patient remains pleasantly confused       Objective     Physical Exam  Vitals reviewed.   Constitutional:       Appearance: Normal appearance.   HENT:      Head: Normocephalic and atraumatic.      Right Ear: Tympanic membrane, ear canal and external ear normal.      Left Ear: Tympanic membrane, ear canal and external ear normal.      Nose: Nose normal.      Mouth/Throat:      Pharynx: Oropharynx is clear.   Eyes:      Extraocular Movements: Extraocular movements intact.      Conjunctiva/sclera: Conjunctivae normal.      Pupils: Pupils are equal, round, and reactive to light.   Cardiovascular:      Rate and Rhythm: Normal rate and regular rhythm.      Pulses: Normal pulses.      Heart sounds: Normal heart sounds.   Pulmonary:      Effort: Pulmonary effort is normal.      Breath sounds: Normal breath sounds.   Abdominal:      General: Abdomen is flat. Bowel sounds are normal.      Palpations: Abdomen is soft.   Musculoskeletal:      Cervical back: Normal range of motion and neck supple.   Skin:     General: Skin is warm and dry.   Neurological:      General: No focal deficit present.      Mental Status: He is alert and oriented to person, place, and time.   Psychiatric:         Mood and Affect: Mood normal.         Last Recorded Vitals  Blood pressure 130/69, pulse 87, temperature 36.8 °C (98.2 °F), temperature source Oral, resp. rate 17, height 1.778 m (5' 10\"), weight 81.6 kg (180 lb), SpO2 98%.  Intake/Output last 3 Shifts:  I/O last 3 completed shifts:  In: 600 (7.3 mL/kg) [P.O.:600]  Out: 225 (2.8 mL/kg) [Urine:225 (0.1 mL/kg/hr)]  Weight: 81.6 kg     Relevant Results               Scheduled medications  busPIRone, 7.5 mg, oral, BID  cefTRIAXone, 1 g, intravenous, q24h  cholecalciferol, 2,000 Units, oral, Daily  docusate sodium, 100 mg, oral, BID  enoxaparin, 40 mg, subcutaneous, " Daily  fluticasone, 1 spray, Each Nostril, Daily  gabapentin, 300 mg, oral, Nightly  melatonin, 3 mg, oral, Daily  mirtazapine, 15 mg, oral, Nightly  nystatin, 1 Application, Topical, BID  OLANZapine zydis, 5 mg, oral, Nightly  oxybutynin, 5 mg, oral, TID  pantoprazole, 40 mg, oral, Daily  polyethylene glycol, 17 g, oral, Daily      Continuous medications  potassium ybddnbl-G7-0.45%NaCl, 75 mL/hr, Last Rate: 75 mL/hr (03/04/25 1143)      PRN medications  PRN medications: acetaminophen **OR** acetaminophen **OR** acetaminophen, benzocaine-menthol, bisacodyl, dextromethorphan-guaifenesin, guaiFENesin, OLANZapine **OR** OLANZapine, ondansetron ODT  Results for orders placed or performed during the hospital encounter of 03/03/25 (from the past 24 hours)   CBC   Result Value Ref Range    WBC 7.5 4.4 - 11.3 x10*3/uL    nRBC 0.0 0.0 - 0.0 /100 WBCs    RBC 3.94 (L) 4.50 - 5.90 x10*6/uL    Hemoglobin 12.6 (L) 13.5 - 17.5 g/dL    Hematocrit 36.7 (L) 41.0 - 52.0 %    MCV 93 80 - 100 fL    MCH 32.0 26.0 - 34.0 pg    MCHC 34.3 32.0 - 36.0 g/dL    RDW 13.0 11.5 - 14.5 %    Platelets 276 150 - 450 x10*3/uL   Comprehensive metabolic panel   Result Value Ref Range    Glucose 103 (H) 74 - 99 mg/dL    Sodium 138 136 - 145 mmol/L    Potassium 4.4 3.5 - 5.3 mmol/L    Chloride 105 98 - 107 mmol/L    Bicarbonate 29 21 - 32 mmol/L    Anion Gap 8 (L) 10 - 20 mmol/L    Urea Nitrogen 12 6 - 23 mg/dL    Creatinine 0.99 0.50 - 1.30 mg/dL    eGFR 73 >60 mL/min/1.73m*2    Calcium 8.7 8.6 - 10.3 mg/dL    Albumin 3.1 (L) 3.4 - 5.0 g/dL    Alkaline Phosphatase 80 33 - 136 U/L    Total Protein 5.4 (L) 6.4 - 8.2 g/dL    AST 17 9 - 39 U/L    Bilirubin, Total 0.4 0.0 - 1.2 mg/dL    ALT 18 10 - 52 U/L     US renal complete    Result Date: 3/4/2025  Interpreted By:  Corinne Schwartz, STUDY: US RENAL COMPLETE; 3/4/2025 8:31 am   INDICATION: Recurrent urinary tract infection   COMPARISON: None.   ACCESSION NUMBER(S): [id number]   ORDERING CLINICIAN: SHAVON RAMIRES    TECHNIQUE: Grayscale and color Doppler imaging of the kidneys.   FINDINGS: The right kidney measures 10.0 cm in length The left kidney measures 10.8 cm in length There is no shadowing calculus, hydronephrosis, or solid mass identified. The renal cortical thickness and echogenicity is normal.   Bilateral ureteral jets are seen in urinary bladder. The prostate gland measures 2.9 x 3.7 x 5.2 cm in size. No bladder mass or bladder calculus is evident.       Normal ultrasound of the kidneys.   Enlarged prostate gland.   MACRO: None.   Signed by: Corinne Schwartz 3/4/2025 10:02 AM Dictation workstation:   [id number]    CT head wo IV contrast    Result Date: 3/3/2025  Interpreted By:  Mitch Davis, STUDY: CT HEAD WO IV CONTRAST; CT CERVICAL SPINE WO IV CONTRAST;  3/3/2025 12:02 pm   INDICATION: Trauma. Signs/Symptoms:Falls.     COMPARISON: July 15, 2022 PET-CT, February 13, 2024 MRI brain, January 24, 2023 CT cervical spine.   ACCESSION NUMBER(S): [id number]; [id number]   ORDERING CLINICIAN: ALEKSANDR CAMPBELL   TECHNIQUE: Axial noncontrast head and cervical spine CT   FINDINGS: Head:   The ventricles, cisterns and sulci are prominent, consistent with mild diffuse volume loss with disproportionate dilation of the ventricles . There are areas of nonspecific white matter hypodensity, which are probably age-related or microvascular in nature.   Gray-white differentiation is intact and there is no evidence of acute cortical infarct. No mass, mass effect or midline shift is seen. There is no evidence of hemorrhage.   The visualized paranasal sinuses are clear.   Calvarium: No acute fracture.   Cervical spine:   Alignment:   Similar alignment with about 1-2 mm C4 anterolisthesis. Asymmetry of the C1-2 facet joints likely related to difference in patient position.   Fracture: No acute fracture.   Vertebra and intervertebral disc spaces: Similar advanced multilevel degenerative changes.   Craniocervical junction: No evident  fracture or dissociation   Paravertebral soft tissues: No paravertebral hematoma. Trace arterial vascular calcifications.   Brain Injury (BIG) guidelines CT values:   Skull fracture: No SDH (subdural hematoma): None detected EDH (epidural hemtoma): None detected IPH (intraparenchymal hemorrhage): None detected SAH (subarachnoid hemorrhage): None detected IVH (intraventricular hemorrhage): No   Reference: Willie MARTIN, Sandy RS, Elaine M, et al. The BIG (brain injury guidelines) project: defining the management of traumatic brain injury by acute care surgeons. J Trauma Acute Care Surg. 2014;76:217l436.       Head: No acute intracranial abnormality or calvarial fracture was identified.   Similar disproportionate central atrophy and nonspecific low-density white matter changes.   Cervical spine: No fracture or interval subluxation.   Asymmetry of the C1-2 facet joints likely positional.   Similar advanced degenerative changes and mild arterial vascular calcifications.   MACRO: None     Signed by: Mitch Davis 3/3/2025 12:20 PM Dictation workstation:   [id number]    CT cervical spine wo IV contrast    Result Date: 3/3/2025  Interpreted By:  Mitch Davis, STUDY: CT HEAD WO IV CONTRAST; CT CERVICAL SPINE WO IV CONTRAST;  3/3/2025 12:02 pm   INDICATION: Trauma. Signs/Symptoms:Falls.     COMPARISON: July 15, 2022 PET-CT, February 13, 2024 MRI brain, January 24, 2023 CT cervical spine.   ACCESSION NUMBER(S): [id number]; [id number]   ORDERING CLINICIAN: ALEKSANDR CAMPBELL   TECHNIQUE: Axial noncontrast head and cervical spine CT   FINDINGS: Head:   The ventricles, cisterns and sulci are prominent, consistent with mild diffuse volume loss with disproportionate dilation of the ventricles . There are areas of nonspecific white matter hypodensity, which are probably age-related or microvascular in nature.   Gray-white differentiation is intact and there is no evidence of acute cortical infarct. No mass, mass effect or  midline shift is seen. There is no evidence of hemorrhage.   The visualized paranasal sinuses are clear.   Calvarium: No acute fracture.   Cervical spine:   Alignment:   Similar alignment with about 1-2 mm C4 anterolisthesis. Asymmetry of the C1-2 facet joints likely related to difference in patient position.   Fracture: No acute fracture.   Vertebra and intervertebral disc spaces: Similar advanced multilevel degenerative changes.   Craniocervical junction: No evident fracture or dissociation   Paravertebral soft tissues: No paravertebral hematoma. Trace arterial vascular calcifications.   Brain Injury (BIG) guidelines CT values:   Skull fracture: No SDH (subdural hematoma): None detected EDH (epidural hemtoma): None detected IPH (intraparenchymal hemorrhage): None detected SAH (subarachnoid hemorrhage): None detected IVH (intraventricular hemorrhage): No   Reference: Willie MARTIN, Sandy RS, Elaine M, et al. The BIG (brain injury guidelines) project: defining the management of traumatic brain injury by acute care surgeons. J Trauma Acute Care Surg. 2014;76:630o428.       Head: No acute intracranial abnormality or calvarial fracture was identified.   Similar disproportionate central atrophy and nonspecific low-density white matter changes.   Cervical spine: No fracture or interval subluxation.   Asymmetry of the C1-2 facet joints likely positional.   Similar advanced degenerative changes and mild arterial vascular calcifications.   MACRO: None     Signed by: Mitch Davis 3/3/2025 12:20 PM Dictation workstation:   [id number]    XR pelvis 1-2 views    Result Date: 3/3/2025  Interpreted By:  Umer Amos and Sullivan Shannon STUDY: XR PELVIS 1-2 VIEWS; ;  3/3/2025 11:23 am   INDICATION: Signs/Symptoms:Falls.     COMPARISON: CT chest abdomen pelvis 03/31/2021   ACCESSION NUMBER(S): [id number]   ORDERING CLINICIAN: ALEKSANDR CAMPBELL   FINDINGS: Single frontal view of the pelvis was provided. A tubular structure  projects over the right hip.   Diffuse osteopenia.   No evidence of acute fracture or dislocation.   The pelvic ring, visualized sacral arcuate lines, ilioischial and iliopectineal lines are preserved. The pubic symphysis appears intact.   Severe degenerative changes of the lower lumbar spine. Moderate to severe bilateral hip osteoarthrosis.   Radiodensity projecting over the left proximal femur corresponds to a heterotopic calcification seen on prior cross-sectional imaging.       No acute osseous abnormality.   I personally reviewed the images/study and I agree with the findings as stated by Radiology resident.   MACRO: None   Signed by: Umer Amos 3/3/2025 12:17 PM Dictation workstation:   [id number]    XR chest 1 view    Result Date: 3/3/2025  Interpreted By:  Umer Amos and Sullivan Shannon STUDY: XR CHEST 1 VIEW;  3/3/2025 11:23 am   INDICATION: Signs/Symptoms:Fall.   COMPARISON: Chest radiograph 02/03/2025   ACCESSION NUMBER(S): [id number]   ORDERING CLINICIAN: ALEKSANDR CAMPBELL   FINDINGS: Frontal radiograph of the chest was provided       CARDIOMEDIASTINAL SILHOUETTE: Cardiac silhouette is stable in size.Atherosclerotic calcifications of the aortic knob. Similar rounded right hilar prominence, corresponding to prominent right pulmonary vasculature seen on prior cross-sectional imaging.   LUNGS: Mild interstitial prominence diffusely. No focal consolidation, pleural effusion, or pneumothorax.   ABDOMEN: No remarkable upper abdominal findings.   BONES: No acute osseous changes.       Mild nonspecific interstitial prominence diffusely. No focal infiltrates.     I personally reviewed the images/study and I agree with the findings as stated by Radiology resident.   MACRO: None   Signed by: Umer Amos 3/3/2025 12:16 PM Dictation workstation:   [id number]                  Assessment/Plan   Assessment & Plan  Acute cystitis without hematuria    Gait instability    Memory impairment    Recurrent falls    NPH  (normal pressure hydrocephalus) (Multi)    Patient remains quite confused  He is a fall risk  No definite abnormality noted in the x-rays and the labs  I had a long conversation with the wife  She says he falls because of NPH.  He does not have dementia but he has memory problems related to NPH   patient was not a candidate for shunt because of his age  She does not think going to rehab will help him  She wants therapy at home.  He brought him to the hospital because he fell and she could not get him up  Will place orders for home care  Behavioral health consult pending   to arrange discharge possibly tomorrow with home care       I spent  minutes in the professional and overall care of this patient.      Aicha Muñoz MD

## 2025-03-04 NOTE — NURSING NOTE
Pt resting in bed. No complaints or concerns. Call light within reach.  Pt continues on IV fluids and atb per order. Bed alarm in place for pt safety.

## 2025-03-05 ENCOUNTER — HOME HEALTH ADMISSION (OUTPATIENT)
Dept: HOME HEALTH SERVICES | Facility: HOME HEALTH | Age: OVER 89
End: 2025-03-05
Payer: MEDICARE

## 2025-03-05 LAB
ANION GAP SERPL CALCULATED.3IONS-SCNC: 10 MMOL/L (ref 10–20)
BACTERIA UR CULT: NORMAL
BUN SERPL-MCNC: 12 MG/DL (ref 6–23)
CALCIUM SERPL-MCNC: 8.6 MG/DL (ref 8.6–10.3)
CHLORIDE SERPL-SCNC: 105 MMOL/L (ref 98–107)
CO2 SERPL-SCNC: 27 MMOL/L (ref 21–32)
CREAT SERPL-MCNC: 0.84 MG/DL (ref 0.5–1.3)
EGFRCR SERPLBLD CKD-EPI 2021: 83 ML/MIN/1.73M*2
ERYTHROCYTE [DISTWIDTH] IN BLOOD BY AUTOMATED COUNT: 13.1 % (ref 11.5–14.5)
GLUCOSE SERPL-MCNC: 90 MG/DL (ref 74–99)
HCT VFR BLD AUTO: 37.3 % (ref 41–52)
HGB BLD-MCNC: 12.7 G/DL (ref 13.5–17.5)
MCH RBC QN AUTO: 31.4 PG (ref 26–34)
MCHC RBC AUTO-ENTMCNC: 34 G/DL (ref 32–36)
MCV RBC AUTO: 92 FL (ref 80–100)
NRBC BLD-RTO: 0 /100 WBCS (ref 0–0)
PLATELET # BLD AUTO: 292 X10*3/UL (ref 150–450)
POTASSIUM SERPL-SCNC: 4.2 MMOL/L (ref 3.5–5.3)
RBC # BLD AUTO: 4.04 X10*6/UL (ref 4.5–5.9)
SODIUM SERPL-SCNC: 138 MMOL/L (ref 136–145)
WBC # BLD AUTO: 7 X10*3/UL (ref 4.4–11.3)

## 2025-03-05 PROCEDURE — 85027 COMPLETE CBC AUTOMATED: CPT | Performed by: INTERNAL MEDICINE

## 2025-03-05 PROCEDURE — 2500000001 HC RX 250 WO HCPCS SELF ADMINISTERED DRUGS (ALT 637 FOR MEDICARE OP): Performed by: INTERNAL MEDICINE

## 2025-03-05 PROCEDURE — 2500000004 HC RX 250 GENERAL PHARMACY W/ HCPCS (ALT 636 FOR OP/ED)

## 2025-03-05 PROCEDURE — 97530 THERAPEUTIC ACTIVITIES: CPT | Mod: GO,CO

## 2025-03-05 PROCEDURE — 96372 THER/PROPH/DIAG INJ SC/IM: CPT | Performed by: INTERNAL MEDICINE

## 2025-03-05 PROCEDURE — 99232 SBSQ HOSP IP/OBS MODERATE 35: CPT

## 2025-03-05 PROCEDURE — 36415 COLL VENOUS BLD VENIPUNCTURE: CPT | Performed by: INTERNAL MEDICINE

## 2025-03-05 PROCEDURE — 2500000005 HC RX 250 GENERAL PHARMACY W/O HCPCS

## 2025-03-05 PROCEDURE — 82374 ASSAY BLOOD CARBON DIOXIDE: CPT | Performed by: INTERNAL MEDICINE

## 2025-03-05 PROCEDURE — 97530 THERAPEUTIC ACTIVITIES: CPT | Mod: GP

## 2025-03-05 PROCEDURE — 97110 THERAPEUTIC EXERCISES: CPT | Mod: GP

## 2025-03-05 PROCEDURE — 97535 SELF CARE MNGMENT TRAINING: CPT | Mod: GO,CO

## 2025-03-05 PROCEDURE — 2500000004 HC RX 250 GENERAL PHARMACY W/ HCPCS (ALT 636 FOR OP/ED): Performed by: INTERNAL MEDICINE

## 2025-03-05 PROCEDURE — 2500000002 HC RX 250 W HCPCS SELF ADMINISTERED DRUGS (ALT 637 FOR MEDICARE OP, ALT 636 FOR OP/ED): Performed by: INTERNAL MEDICINE

## 2025-03-05 PROCEDURE — 99233 SBSQ HOSP IP/OBS HIGH 50: CPT | Performed by: INTERNAL MEDICINE

## 2025-03-05 PROCEDURE — 96372 THER/PROPH/DIAG INJ SC/IM: CPT

## 2025-03-05 PROCEDURE — 96366 THER/PROPH/DIAG IV INF ADDON: CPT

## 2025-03-05 PROCEDURE — 2500000002 HC RX 250 W HCPCS SELF ADMINISTERED DRUGS (ALT 637 FOR MEDICARE OP, ALT 636 FOR OP/ED)

## 2025-03-05 PROCEDURE — G0378 HOSPITAL OBSERVATION PER HR: HCPCS

## 2025-03-05 RX ADMIN — BUSPIRONE HYDROCHLORIDE 7.5 MG: 5 TABLET ORAL at 21:01

## 2025-03-05 RX ADMIN — PANTOPRAZOLE SODIUM 40 MG: 20 TABLET, DELAYED RELEASE ORAL at 05:39

## 2025-03-05 RX ADMIN — OXYBUTYNIN CHLORIDE 5 MG: 5 TABLET ORAL at 15:12

## 2025-03-05 RX ADMIN — Medication 3 MG: at 18:19

## 2025-03-05 RX ADMIN — OLANZAPINE 2.5 MG: 10 INJECTION, POWDER, FOR SOLUTION INTRAMUSCULAR at 02:14

## 2025-03-05 RX ADMIN — Medication 2000 UNITS: at 08:09

## 2025-03-05 RX ADMIN — ENOXAPARIN SODIUM 40 MG: 40 INJECTION SUBCUTANEOUS at 08:08

## 2025-03-05 RX ADMIN — ACETAMINOPHEN 650 MG: 325 TABLET ORAL at 13:55

## 2025-03-05 RX ADMIN — CEFTRIAXONE SODIUM 1 G: 1 INJECTION, SOLUTION INTRAVENOUS at 09:35

## 2025-03-05 RX ADMIN — DOCUSATE SODIUM 100 MG: 100 CAPSULE, LIQUID FILLED ORAL at 08:09

## 2025-03-05 RX ADMIN — GABAPENTIN 300 MG: 300 CAPSULE ORAL at 21:00

## 2025-03-05 RX ADMIN — MIRTAZAPINE 15 MG: 15 TABLET, FILM COATED ORAL at 21:01

## 2025-03-05 RX ADMIN — POLYETHYLENE GLYCOL 3350 17 G: 17 POWDER, FOR SOLUTION ORAL at 08:08

## 2025-03-05 RX ADMIN — DOCUSATE SODIUM 100 MG: 100 CAPSULE, LIQUID FILLED ORAL at 21:01

## 2025-03-05 RX ADMIN — OXYBUTYNIN CHLORIDE 5 MG: 5 TABLET ORAL at 21:01

## 2025-03-05 RX ADMIN — NYSTATIN 1 APPLICATION: 100000 CREAM TOPICAL at 08:10

## 2025-03-05 RX ADMIN — OLANZAPINE 2.5 MG: 2.5 TABLET, FILM COATED ORAL at 18:19

## 2025-03-05 RX ADMIN — OXYBUTYNIN CHLORIDE 5 MG: 5 TABLET ORAL at 08:09

## 2025-03-05 RX ADMIN — BUSPIRONE HYDROCHLORIDE 7.5 MG: 5 TABLET ORAL at 08:09

## 2025-03-05 RX ADMIN — FLUTICASONE PROPIONATE 1 SPRAY: 50 SPRAY, METERED NASAL at 08:08

## 2025-03-05 ASSESSMENT — COGNITIVE AND FUNCTIONAL STATUS - GENERAL
MOVING FROM LYING ON BACK TO SITTING ON SIDE OF FLAT BED WITH BEDRAILS: A LOT
MOBILITY SCORE: 18
DRESSING REGULAR LOWER BODY CLOTHING: A LITTLE
STANDING UP FROM CHAIR USING ARMS: A LITTLE
PERSONAL GROOMING: A LITTLE
PERSONAL GROOMING: A LITTLE
WALKING IN HOSPITAL ROOM: A LITTLE
DRESSING REGULAR UPPER BODY CLOTHING: A LOT
CLIMB 3 TO 5 STEPS WITH RAILING: A LOT
DAILY ACTIVITIY SCORE: 14
EATING MEALS: A LITTLE
DRESSING REGULAR UPPER BODY CLOTHING: A LITTLE
TOILETING: A LOT
MOBILITY SCORE: 11
HELP NEEDED FOR BATHING: A LITTLE
TOILETING: A LITTLE
WALKING IN HOSPITAL ROOM: A LOT
CLIMB 3 TO 5 STEPS WITH RAILING: TOTAL
TURNING FROM BACK TO SIDE WHILE IN FLAT BAD: A LITTLE
DRESSING REGULAR LOWER BODY CLOTHING: A LOT
MOVING TO AND FROM BED TO CHAIR: A LOT
TURNING FROM BACK TO SIDE WHILE IN FLAT BAD: A LOT
STANDING UP FROM CHAIR USING ARMS: A LOT
MOVING TO AND FROM BED TO CHAIR: A LITTLE
HELP NEEDED FOR BATHING: A LOT
DAILY ACTIVITIY SCORE: 19

## 2025-03-05 ASSESSMENT — ACTIVITIES OF DAILY LIVING (ADL)
HOME_MANAGEMENT_TIME_ENTRY: 12
BATHING_WHERE_ASSESSED: OTHER (COMMENT)
BATHING_LEVEL_OF_ASSISTANCE: MAXIMUM ASSISTANCE

## 2025-03-05 ASSESSMENT — PAIN SCALES - GENERAL
PAINLEVEL_OUTOF10: 0 - NO PAIN

## 2025-03-05 ASSESSMENT — PAIN - FUNCTIONAL ASSESSMENT
PAIN_FUNCTIONAL_ASSESSMENT: 0-10
PAIN_FUNCTIONAL_ASSESSMENT: 0-10

## 2025-03-05 NOTE — PROGRESS NOTES
03/05/25 1603   Discharge Planning   Expected Discharge Disposition [facility]  ([facility])     [facility] Spoke to patients son Enmanuel who is listed as POA, no documents in the system at this time   Nursing is concerned that patient is unsafe to return home  Son states he has set up home to accommodate patient and he also has good family support   States pts hydrocephaly has been progressing and this is what leads to pts weakness and memory issues.   Wife is with patient and grand daughter is very present   Son called patients spouse and spoke with her regarding SNF and having pal med speak to them   States pt will return home and spouse is not ready to talk to pal med at this time   Son will get things set for pt to return home and will try to get wife some extra support from family in the home.   From conversation it seems that wife is POA and son is alternate  Son to email POA paperwork to [facility] to verify     ** do not discharge without speaking to care coordination**

## 2025-03-05 NOTE — PROGRESS NOTES
Physical Therapy    Physical Therapy Treatment    Patient Name: Enmanuel Bhatia  MRN: [id number]  Department: [address]  Room: [address] [place] [address]  Today's Date: 3/5/2025  Time Calculation  Start Time: 1342  Stop Time: 1408  Time Calculation (min): 26 min         Assessment/Plan   PT Assessment  PT Assessment Results: Decreased strength, Decreased range of motion, Decreased endurance, Impaired balance, Decreased mobility, Decreased coordination, Impaired judgement, Decreased safety awareness, Pain  Rehab Prognosis: Good  Barriers to Discharge Home: Cognition needs, Physical needs  Cognition Needs: 24hr supervision for safety awareness needed, Recollection or understanding of precautions/restrictions limited  Physical Needs: 24hr mobility assistance needed  Evaluation/Treatment Tolerance: Patient limited by fatigue  Medical Staff Made Aware: Yes  Barriers to Participation: Comorbidities  End of Session Communication: Bedside nurse  Assessment Comment: pt slowly progressing; pt required MOD A x 1-2 for transfers/mobility; pt is a high falls risk  End of Session Patient Position: Up in chair, Alarm on (call button in reach)  PT Plan  Inpatient/Swing Bed or Outpatient: Inpatient    PT Plan  Treatment/Interventions: Transfer training, Bed mobility, Gait training, Balance training, Strengthening, Endurance training, Therapeutic exercise  PT Plan: Ongoing PT  PT Frequency: 4 times per week  PT Discharge Recommendations: Moderate intensity level of continued care  Equipment Recommended upon Discharge: Wheeled walker  PT Recommended Transfer Status:  (MOD A x 1-2)  PT - OK to Discharge: Yes      General Visit Information:   PT  Visit  PT Received On: 03/05/25  General  Reason for Referral: ADL impairment; 88 y/o male admitted with acute cystitis without hematuria; frequent falls  Referred By: Dr. Muñoz  Past Medical History Relevant to Rehab: osteopenia; dementia; falls; BPH; GERD: depression; urinary incontinence  Family/Caregiver Present:   (spouse arrived during session)  Co-Treatment: OT  Prior to Session Communication: Bedside nurse  Patient Position Received: Bed, 3 rail up, Alarm on  General Comment: pt cooperative and agreeable to work with therapy    Subjective   Precautions:  Precautions  Hearing/Visual Limitations: no glasses; moderate [place]  Medical Precautions: Fall precautions            Objective   Pain:  Pain Assessment  Pain Assessment:  (2/10 headache)    Cognition:  Cognition  Overall Cognitive Status: Impaired at baseline  Orientation Level: Disoriented to place, Disoriented to time, Disoriented to situation  Following Commands: Follows one step commands with increased time  Insight: Moderate  Processing Speed: Delayed    Coordination:  Coordination Comment: difficulty advancing BLE  Postural Control:  Postural Control  Posture Comment: forward flexed rigid trunk; mild R lateral trunk lean    Activity Tolerance:  Activity Tolerance  Endurance:  (FAIR+ activity tolerance)  Treatments:  Therapeutic Exercise  Therapeutic Exercise Performed:  (BLE therex: AP, GS, ABD, LAQ,  HS X 10-15 reps)         Bed Mobility  Bed Mobility:  (supine to sit with MOD A for trunk up and B LE; MIN A for scooting to EOB. GOOD-/FAIR+ sitting balance. pt presented with occasional posterior lean. intermittent VC and MIN A for trunk support.)    Ambulation/Gait Training  Ambulation/Gait Training Performed:  (pt took 5-6 short inconsistent steps using rolling walker with MOD A x 1-2. pt had difficulty weight shifting and advancing steps. frequent B knee buckling noted.)      Transfers  Transfer:  (sit <-> stand x 3 trials with MOD A using Rolling walker; frequent VC for safe hand placement; pt with increased postural sway and posterior lean. educated pts spouse regarding pt needing MOD A for transfers and being a high falls risk. POOR- eccentric control noted    Outcome Measures:  [facility] Basic Mobility  Turning from your back to your side while in a flat bed  without using bedrails: A lot  Moving from lying on your back to sitting on the side of a flat bed without using bedrails: A lot  Moving to and from bed to chair (including a wheelchair): A lot  Standing up from a chair using your arms (e.g. wheelchair or bedside chair): A lot  To walk in hospital room: A lot  Climbing 3-5 steps with railing: Total  Basic Mobility - Total Score: 11           Encounter Problems       Encounter Problems (Active)       Mobility       STG - Patient will ambulate 30' x 1 using rolling walker with MIN A (Progressing)       Start:  03/04/25    Expected End:  03/18/25            STG - Patient will negotiate 3 stairs using 1 railing and cane with MIN A (Progressing)       Start:  03/04/25    Expected End:  03/18/25               PT Transfers       STG - Patient to transfer to and from sit to supine with MIN A (Progressing)       Start:  03/04/25    Expected End:  03/18/25            STG - Patient will transfer sit to and from stand with MIN A (Progressing)       Start:  03/04/25    Expected End:  03/18/25               Pain - Adult

## 2025-03-05 NOTE — PROGRESS NOTES
"Occupational Therapy    OT Treatment    Patient Name: Enmanuel Bhatia  MRN: [id number]  Department: [address]  [facility]  Today's Date: 3/5/2025  Time Calculation  Start Time: 1353  Stop Time: 1424  Time Calculation (min): 31 min        Assessment:  OT Assessment: Pt deonstrating quick change in tempor with session focused to ADL skills, family training sppouse .  Pt difficulty with transfers soft BL knees HIGH fall risk.  Discussion with spouse recommendation SNF spouse adament \"I want to take him  home\"  Trial transer chair to bed with spouse unable to complete d/t soft knees.  Pt wtih slow progress to POC.  Will continue to address remaining deficits withskilled OT intervention .  Prognosis: Fair  Barriers to Discharge Home: Cognition needs, Physical needs  Cognition Needs: 24hr supervision for safety awareness needed  Physical Needs: 24hr mobility assistance needed, 24hr ADL assistance needed  Evaluation/Treatment Tolerance: Patient limited by fatigue  Medical Staff Made Aware: Yes  End of Session Communication: Bedside nurse  End of Session Patient Position: Up in chair, Alarm on, Caregiver's arms (call light in reach all needs met)  OT Assessment Results: Decreased ADL status, Decreased safe judgment during ADL, Decreased cognition, Decreased endurance, Decreased functional mobility, Decreased gross motor control, Decreased fine motor control  Prognosis: Fair  Barriers to Discharge: Inaccessible home environment, Decreased caregiver support  Evaluation/Treatment Tolerance: Patient limited by fatigue  Medical Staff Made Aware: Yes  Strengths: Attitude of self  Barriers to Participation: Comorbidities, Ability to acquire knowledge  Plan:  Treatment Interventions: ADL retraining, Functional transfer training, Endurance training, Patient/family training, Equipment evaluation/education, Compensatory technique education  OT Frequency: 3 times per week  OT Discharge Recommendations: Moderate intensity level of " continued care, 24 hr supervision due to cognition  Equipment Recommended upon Discharge: Wheeled walker  OT Recommended Transfer Status: Assist of 1, Moderate assist  OT - OK to Discharge: Yes  Treatment Interventions: ADL retraining, Functional transfer training, Endurance training, Patient/family training, Equipment evaluation/education, Compensatory technique education    Subjective   Previous Visit Info:  OT Last Visit  OT Received On: 03/05/25  General:  General  Reason for Referral: ADL impairment; 88 y/o male admitted with acute cystitis without hematuria; frequent falls  Referred By: Dr. Muñoz  Past Medical History Relevant to Rehab: osteopenia; dementia; falls; BPH; GERD: depression; urinary incontinence  Family/Caregiver Present: Yes  Caregiver Feedback: spouse  Co-Treatment: PT  Prior to Session Communication: Bedside nurse  Patient Position Received: Bed, 3 rail up, Alarm on  Preferred Learning Style: verbal, visual  General Comment: Pt cleared by NSLENIN and is agreeable to skilled OT intervention  Precautions:  Hearing/Visual Limitations: no glasses; moderate [place]  Medical Precautions: Fall precautions            Pain:  Pain Assessment  Pain Assessment: 0-10  0-10 (Numeric) Pain Score: 0 - No pain    Objective    Cognition:  Cognition  Overall Cognitive Status: Impaired at baseline  Orientation Level: Disoriented to place, Disoriented to time, Disoriented to situation  Following Commands: Follows one step commands with increased time  Cognition Comments: easily aggitated  Sustained Attention: Impaired  Insight: Moderate  Processing Speed: Delayed  Coordination:  Movements are Fluid and Coordinated: No  Upper Body Coordination: impaired motor planning; decreased coordination noted  Coordination Comment: slow movements  Activities of Daily Living: Feeding  Feeding Level of Assistance: Close supervision  Feeding Where Assessed: Chair  Feeding Comments: eating lunch cues attend/setup    Grooming  Grooming Level  of Assistance: Close supervision  Grooming Where Assessed: Chair  Grooming Comments: Pt washing face/hands comb hair    UE Bathing  UE Bathing Level of Assistance: Close supervision  UE Bathing Where Assessed: Other (Comment) (chair)  UE Bathing Comments: Pt spoge bathng cues attend sequence    LE Bathing  LE Bathing Level of Assistance: Maximum assistance  LE Bathing Where Assessed: Other (Comment) (chair)  LE Bathing Comments: sponge bathing assist buttocks/lj area cues  figure four wash B feet    UE Dressing  UE Dressing Level of Assistance: Moderate assistance  UE Dressing Where Assessed: Chair  UE Dressing Comments: doff/don hospital gown    LE Dressing  LE Dressing: Yes  Sock Level of Assistance: Contact guard  LE Dressing Where Assessed: Chair  LE Dressing Comments: figure four doff/don socks    Toileting  Toileting Level of Assistance: Minimum assistance  Where Assessed: Bed level (edge of bed)  Toileting Comments: urinal use P seated balance  Functional Standing Tolerance:  Time: 30 seconds  Activity: transfer  Functional Standing Tolerance Comments: BUE suppport RW soft B knees fall risk  Bed Mobility/Transfers:      Transfers  Transfer: Yes  Transfer 1  Transfer From 1: Bed to  Transfer to 1: Chair with arms  Technique 1: Sit to stand, Stand to sit  Transfer Device 1: Walker  Trials/Comments 1: 2 person  d/t safety and soft B knees  Sitting Balance:  Dynamic Sitting Balance  Dynamic Sitting-Balance Support: Feet supported  Dynamic Sitting-Level of Assistance: Moderate assistance  Dynamic Sitting-Balance: Forward lean, Reaching for objects  Dynamic Sitting-Comments: R lateral lean slight LOB seated edge of bed  Outcome Measures:[facility] Daily Activity  Putting on and taking off regular lower body clothing: A lot  Bathing (including washing, rinsing, drying): A lot  Putting on and taking off regular upper body clothing: A lot  Toileting, which includes using toilet, bedpan or urinal: A lot  Taking care of  personal grooming such as brushing teeth: A little  Eating Meals: A little  Daily Activity - Total Score: 14    Education Documentation  Body Mechanics, taught by ISABELLE Navarro at 3/5/2025  2:36 PM.  Learner: Significant Other, Patient  Readiness: Acceptance  Method: Explanation, Demonstration, Teach-back  Response: Verbalizes Understanding, Needs Reinforcement, Demonstrated Understanding  Comment: Instructed spouse and Pt with safety in transfers, balance strategies, adaptive ADL skkills    ADL Training, taught by ISABELLE Navarro at 3/5/2025  2:36 PM.  Learner: Significant Other, Patient  Readiness: Acceptance  Method: Explanation, Demonstration, Teach-back  Response: Verbalizes Understanding, Needs Reinforcement, Demonstrated Understanding  Comment: Instructed spouse and Pt with safety in transfers, balance strategies, adaptive ADL skkills    Education Comments  No comments found.        OP EDUCATION:       Goals:  Encounter Problems       Encounter Problems (Active)       ADLs       Patient with complete upper body dressing with minimal assist  level of assistance donning and doffing all UE clothes while edge of bed  (Progressing)       Start:  03/04/25    Expected End:  04/04/25            Patient with complete lower body dressing with minimal assist  level of assistance donning and doffing all LE clothes  with PRN adaptive equipment while edge of bed  (Progressing)       Start:  03/04/25    Expected End:  04/04/25            Patient will complete daily grooming tasks with supervision level of assistance and PRN adaptive equipment while standing. (Progressing)       Start:  03/04/25    Expected End:  04/04/25            Patient will complete toileting including hygiene clothing management/hygiene with minimal assist  level of assistance and raised toilet seat and grab bars. (Progressing)       Start:  03/04/25    Expected End:  04/04/25               TRANSFERS       Patient will complete functional  transfer to toilet/commode with least restrictive device with contact guard assist level of assistance. (Progressing)       Start:  03/04/25    Expected End:  04/04/25

## 2025-03-05 NOTE — PROGRESS NOTES
"Enmanuel Bhatia is a 89 y.o. male on day 0 of admission presenting with Acute cystitis without hematuria.      Subjective   The chart was reviewed, and the patient’s case was discussed with the assigned RN at the bedside. During our previous one-on-one meeting, the patient had just finished using a urinal. He appeared calm and friendly, although he did not know his exact location, only that he was in the city. When we asked him a few questions about our previous meeting, he did not recall the details.    Throughout our encounter, the patient maintained good eye contact and was pleasant. When asked about his sleep, he reported that it was good, and he stated that he is eating okay. He denies feeling anxious or depressed and feels safe. He also denies experiencing any auditory or visual hallucinations, noting that it was just he and I in the room and not anyone else.    The patient denies having any suicidal thoughts. We inquired whether his wife had visited today or planned to visit later, but he indicated that he did not know. He was given the opportunity to ask questions but did not have any.    The RN reported that the patient was restless again last night, leading the night shift nurse to give him PRN Zyprexa, which helped him relax and sleep.    Other than that, there are no acute events ongoing today. We appreciate being involved in this patient case and will continue to follow up with him again tomorrow.       Objective     Last Recorded Vitals  Blood pressure 144/82, pulse 87, temperature 36.5 °C (97.7 °F), temperature source Oral, resp. rate 20, height 1.778 m (5' 10\"), weight 81.6 kg (180 lb), SpO2 100%.    Review of Systems    Psychiatric ROS - Adult  Anxiety: Negative  Depression: negative  Delirium: acute inattention, change in speech, disorientation, sleep-wake abnormal, and waxing and waning  Psychosis: negative  Anna: negative  Safety Issues:  Confusion  Psychiatric ROS Comment: As noted    Physical " "Exam      Mental Status Exam  General Appearance: Fairly groomed. Hospital attire Restless  Gait/Station: Within normal limits  Speech: Slow speech, normal tone  Mood: \"I'm good\"  Affect: Congruent with mood and topic of conversation  Thought Process: Tangential  Thought Associations: Severe loosening of associations and Occasional derailment  Thought Content: tangential  Perception: No perceptual abnormalities noted  Level of Consciousness:  Altered and confused  Orientation: Not oriented to place, time/date, situation, day of week, month of year, and year  Attention and Concentration: Unable to assess due to  patient’s confusion, we are unable to effectively assess their attention and concentration at this time.     Recent Memory: Impaired as evidenced by difficulty recalling details from the past 24 hours  and Impaired as evidenced by confabulation   Remote Memory: Impaired as evidenced by difficulty recalling previous medical issues , Impaired as evidenced by difficulty recalling events from childhood , and Impaired as evidenced by confabulation   Executive function: Impaired as evidenced by Inattention and poor impulse control due to confusion   Language: Word finding difficulties  Fund of knowledge: Severely limited  Insight: Severely impaired, as evidenced by inability to understand and lack of awareness    Judgment: Limited, as evidence by inability to reason through medical decision making and diminished ability to reason    Psychiatric Risk Assessment  Violence Risk Assessment: male and other dementia  Acute Risk of Harm to Others is Considered: low and moderate   Suicide Risk Assessment: age > 65 yrs old, , and male  Protective Factors against Suicide: positive family relationships  Acute Risk of Harm to Self is Considered: low       Relevant Results      Scheduled medications  busPIRone, 7.5 mg, oral, BID  cefTRIAXone, 1 g, intravenous, q24h  cholecalciferol, 2,000 Units, oral, Daily  docusate " sodium, 100 mg, oral, BID  enoxaparin, 40 mg, subcutaneous, Daily  fluticasone, 1 spray, Each Nostril, Daily  gabapentin, 300 mg, oral, Nightly  melatonin, 3 mg, oral, Daily  mirtazapine, 15 mg, oral, Nightly  nystatin, 1 Application, Topical, BID  OLANZapine zydis, 5 mg, oral, Nightly  oxybutynin, 5 mg, oral, TID  pantoprazole, 40 mg, oral, Daily  polyethylene glycol, 17 g, oral, Daily      Continuous medications  potassium [id number]%NaCl, 75 mL/hr, Last Rate: 75 mL/hr (03/04/25 2244)      PRN medications  PRN medications: acetaminophen **OR** acetaminophen **OR** acetaminophen, benzocaine-menthol, bisacodyl, dextromethorphan-guaifenesin, guaiFENesin, OLANZapine **OR** OLANZapine, ondansetron ODT        Results for orders placed or performed during the hospital encounter of 03/03/25 (from the past 96 hours)   CBC and Auto Differential   Result Value Ref Range    WBC 7.0 4.4 - 11.3 x10*3/uL    nRBC 0.0 0.0 - 0.0 /100 WBCs    RBC 3.85 (L) 4.50 - 5.90 x10*6/uL    Hemoglobin 12.3 (L) 13.5 - 17.5 g/dL    Hematocrit 36.1 (L) 41.0 - 52.0 %    MCV 94 80 - 100 fL    MCH 31.9 26.0 - 34.0 pg    MCHC 34.1 32.0 - 36.0 g/dL    RDW 13.2 11.5 - 14.5 %    Platelets 271 150 - 450 x10*3/uL    Neutrophils % 65.2 40.0 - 80.0 %    Immature Granulocytes %, Automated 0.7 0.0 - 0.9 %    Lymphocytes % 19.9 13.0 - 44.0 %    Monocytes % 9.4 2.0 - 10.0 %    Eosinophils % 4.1 0.0 - 6.0 %    Basophils % 0.7 0.0 - 2.0 %    Neutrophils Absolute 4.56 1.60 - 5.50 x10*3/uL    Immature Granulocytes Absolute, Automated 0.05 0.00 - 0.50 x10*3/uL    Lymphocytes Absolute 1.39 0.80 - 3.00 x10*3/uL    Monocytes Absolute 0.66 0.05 - 0.80 x10*3/uL    Eosinophils Absolute 0.29 0.00 - 0.40 x10*3/uL    Basophils Absolute 0.05 0.00 - 0.10 x10*3/uL   Magnesium   Result Value Ref Range    Magnesium 2.10 1.60 - 2.40 mg/dL   Comprehensive metabolic panel   Result Value Ref Range    Glucose 90 74 - 99 mg/dL    Sodium 138 136 - 145 mmol/L    Potassium 4.0 3.5 -  5.3 mmol/L    Chloride 106 98 - 107 mmol/L    Bicarbonate 28 21 - 32 mmol/L    Anion Gap 8 (L) 10 - 20 mmol/L    Urea Nitrogen 10 6 - 23 mg/dL    Creatinine 0.84 0.50 - 1.30 mg/dL    eGFR 83 >60 mL/min/1.73m*2    Calcium 8.7 8.6 - 10.3 mg/dL    Albumin 3.3 (L) 3.4 - 5.0 g/dL    Alkaline Phosphatase 78 33 - 136 U/L    Total Protein 5.1 (L) 6.4 - 8.2 g/dL    AST 21 9 - 39 U/L    Bilirubin, Total 0.5 0.0 - 1.2 mg/dL    ALT 20 10 - 52 U/L   Coagulation Screen   Result Value Ref Range    Protime 10.7 9.3 - 12.7 seconds    INR 1.0 0.9 - 1.2    aPTT 25.8 22.0 - 32.5 seconds   Lipase   Result Value Ref Range    Lipase 32 9 - 82 U/L   B-Type Natriuretic Peptide   Result Value Ref Range     (H) 0 - 99 pg/mL   Troponin I, High Sensitivity, Initial   Result Value Ref Range    Troponin I, High Sensitivity 11 0 - 20 ng/L   Sars-CoV-2 and Influenza A/B PCR   Result Value Ref Range    Flu A Result Not Detected Not Detected    Flu B Result Not Detected Not Detected    Coronavirus 2019, PCR Not Detected Not Detected   RSV PCR   Result Value Ref Range    RSV PCR Not Detected Not Detected   Urinalysis with Reflex Culture and Microscopic   Result Value Ref Range    Color, Urine Light-Yellow Light-Yellow, Yellow, Dark-Yellow    Appearance, Urine Turbid (N) Clear    Specific Gravity, Urine 1.009 1.005 - 1.035    pH, Urine 6.5 5.0, 5.5, 6.0, 6.5, 7.0, 7.5, 8.0    Protein, Urine NEGATIVE NEGATIVE, 10 (TRACE), 20 (TRACE) mg/dL    Glucose, Urine Normal Normal mg/dL    Blood, Urine 0.03 (TRACE) (A) NEGATIVE mg/dL    Ketones, Urine NEGATIVE NEGATIVE mg/dL    Bilirubin, Urine NEGATIVE NEGATIVE mg/dL    Urobilinogen, Urine Normal Normal mg/dL    Nitrite, Urine NEGATIVE NEGATIVE    Leukocyte Esterase, Urine 500 Manjinder/uL (A) NEGATIVE   Extra Urine Gray Tube   Result Value Ref Range    Extra Tube Hold for add-ons.    Microscopic Only, Urine   Result Value Ref Range    WBC, Urine 21-50 (A) 1-5, NONE /HPF    RBC, Urine 11-20 (A) NONE, 1-2, 3-5  /HPF    Bacteria, Urine 1+ (A) NONE SEEN /HPF   Urine Culture    Specimen: Clean Catch/Voided; Urine   Result Value Ref Range    Urine Culture       Growth indicates contamination with Gram positive josy. Repeat culture if clinically indicated.   Troponin, High Sensitivity, 1 Hour   Result Value Ref Range    Troponin I, High Sensitivity 11 0 - 20 ng/L   CBC   Result Value Ref Range    WBC 7.5 4.4 - 11.3 x10*3/uL    nRBC 0.0 0.0 - 0.0 /100 WBCs    RBC 3.94 (L) 4.50 - 5.90 x10*6/uL    Hemoglobin 12.6 (L) 13.5 - 17.5 g/dL    Hematocrit 36.7 (L) 41.0 - 52.0 %    MCV 93 80 - 100 fL    MCH 32.0 26.0 - 34.0 pg    MCHC 34.3 32.0 - 36.0 g/dL    RDW 13.0 11.5 - 14.5 %    Platelets 276 150 - 450 x10*3/uL   Comprehensive metabolic panel   Result Value Ref Range    Glucose 103 (H) 74 - 99 mg/dL    Sodium 138 136 - 145 mmol/L    Potassium 4.4 3.5 - 5.3 mmol/L    Chloride 105 98 - 107 mmol/L    Bicarbonate 29 21 - 32 mmol/L    Anion Gap 8 (L) 10 - 20 mmol/L    Urea Nitrogen 12 6 - 23 mg/dL    Creatinine 0.99 0.50 - 1.30 mg/dL    eGFR 73 >60 mL/min/1.73m*2    Calcium 8.7 8.6 - 10.3 mg/dL    Albumin 3.1 (L) 3.4 - 5.0 g/dL    Alkaline Phosphatase 80 33 - 136 U/L    Total Protein 5.4 (L) 6.4 - 8.2 g/dL    AST 17 9 - 39 U/L    Bilirubin, Total 0.4 0.0 - 1.2 mg/dL    ALT 18 10 - 52 U/L   CBC   Result Value Ref Range    WBC 7.0 4.4 - 11.3 x10*3/uL    nRBC 0.0 0.0 - 0.0 /100 WBCs    RBC 4.04 (L) 4.50 - 5.90 x10*6/uL    Hemoglobin 12.7 (L) 13.5 - 17.5 g/dL    Hematocrit 37.3 (L) 41.0 - 52.0 %    MCV 92 80 - 100 fL    MCH 31.4 26.0 - 34.0 pg    MCHC 34.0 32.0 - 36.0 g/dL    RDW 13.1 11.5 - 14.5 %    Platelets 292 150 - 450 x10*3/uL   Basic Metabolic Panel   Result Value Ref Range    Glucose 90 74 - 99 mg/dL    Sodium 138 136 - 145 mmol/L    Potassium 4.2 3.5 - 5.3 mmol/L    Chloride 105 98 - 107 mmol/L    Bicarbonate 27 21 - 32 mmol/L    Anion Gap 10 10 - 20 mmol/L    Urea Nitrogen 12 6 - 23 mg/dL    Creatinine 0.84 0.50 - 1.30 mg/dL     eGFR 83 >60 mL/min/1.73m*2    Calcium 8.6 8.6 - 10.3 mg/dL      Med list and pertinent labs reviewed.   Assessment/Plan   Assessment & Plan  Acute cystitis without hematuria    Gait instability    Recurrent falls    Memory impairment    NPH (normal pressure hydrocephalus) (Multi)  1) Agitation  Likely due to delirium superimposed on underlying cognitive impairment.      Plan:  - Order olanzapine 2.5 mg PO/ 2.5mg IM Q6H PRN for agitation  - Continue melatonin 3 mg at 1800 to aid sleep-wake cycle  - Continue busPIRone (Buspar) tablet 7.5 mg, PO 2 times daily, at bedtime  for anxiety. (Patients own meds)              - Continue mirtazapine (Remeron) tablet 15 mg, PO, Nightly, for post traumatic stress disorder               - Continue Zyprexa Zydis 5 mg PO at bedtime  to help with behavioral disturbances and impulse control.     - Recommend diligence in correcting any metabolic or infectious derangements that can contribute to/worsen encephalopathy and delirium.     - Recommend STRICT delirium precautions:    -- Minimize use of benzos, opiates, anticholinergics, as these may worsen mental status   -- Would use caution with narcotic pain medications   -- Would still adequately controlling pain, as uncontrolled pain is also a risk factor for delirium   -- Reinforce sleep hygiene; encourage patient to stay awake during the day   -- Keep curtains/blinds open during the day and closed at night.   -- Would recommend reorienting/redirecting patient as much as possible,    -- Aim for consistent staffing, familiar objects, avoiding bright lights and loud noises, etc     The patient currently does not meet the criteria for the inpatient psychiatric treatment.      Thank you for allowing us to participate in the care of this patient. We will continue to follow the patient while they are admitted.        I personally spent 37 minutes today providing care for this patient, including preparation, face to face time, documentation and  other services such as review of medical records, diagnostic result, collaboration with primary team and providers, patient education, counseling, coordination of care as specified in the encounter.       Parts of this chart have been completed using voice recognition software.  Please excuse any errors of transcription.  Despite the medical decision making time stamp, my medical decision making has taken place during the patient's entire visit.  Thought process and reason for plan has been formulated from the time that I saw the patient until the time of disposition and is not specific to one specific moment during their visit and furthermore the medical decision making encompasses the entire chart and not only that represented in this note.      JEREMY DiazP-BC  Psychiatry, [facility]  1* contact: [employer] preferred

## 2025-03-05 NOTE — CARE PLAN
The patient's goals for the shift include Help with adls    The clinical goals for the shift include pt will remain safe and free from injury      Problem: Pain - Adult  Goal: Verbalizes/displays adequate comfort level or baseline comfort level  Outcome: Progressing     Problem: Safety - Adult  Goal: Free from fall injury  Outcome: Progressing     Problem: Discharge Planning  Goal: Discharge to home or other facility with appropriate resources  Outcome: Progressing     Problem: Chronic Conditions and Co-morbidities  Goal: Patient's chronic conditions and co-morbidity symptoms are monitored and maintained or improved  Outcome: Progressing     Problem: Nutrition  Goal: Nutrient intake appropriate for maintaining nutritional needs  Outcome: Progressing     Problem: Fall/Injury  Goal: Not fall by end of shift  Outcome: Progressing  Goal: Be free from injury by end of the shift  Outcome: Progressing  Goal: Verbalize understanding of personal risk factors for fall in the hospital  Outcome: Progressing  Goal: Verbalize understanding of risk factor reduction measures to prevent injury from fall in the home  Outcome: Progressing  Goal: Use assistive devices by end of the shift  Outcome: Progressing  Goal: Pace activities to prevent fatigue by end of the shift  Outcome: Progressing     Problem: Pain  Goal: Takes deep breaths with improved pain control throughout the shift  Outcome: Progressing  Goal: Turns in bed with improved pain control throughout the shift  Outcome: Progressing  Goal: Walks with improved pain control throughout the shift  Outcome: Progressing  Goal: Performs ADL's with improved pain control throughout shift  Outcome: Progressing  Goal: Participates in PT with improved pain control throughout the shift  Outcome: Progressing  Goal: Free from opioid side effects throughout the shift  Outcome: Progressing  Goal: Free from acute confusion related to pain meds throughout the shift  Outcome: Progressing

## 2025-03-05 NOTE — PROGRESS NOTES
03/05/25 1052   Discharge Planning   Expected Discharge Disposition Home Health  ([facility])     Will need internal referral for home health upon discharge  ** do not discharge without speaking to care coordination**

## 2025-03-05 NOTE — PROGRESS NOTES
"Enmanuel Bhatia is a 89 y.o. male on day 0 of admission presenting with Acute cystitis without hematuria.    Subjective   Patient remains pleasantly confused       Objective     Physical Exam  Vitals reviewed.   Constitutional:       Appearance: Normal appearance.   HENT:      Head: Normocephalic and atraumatic.      Right Ear: Tympanic membrane, ear canal and external ear normal.      Left Ear: Tympanic membrane, ear canal and external ear normal.      Nose: Nose normal.      Mouth/Throat:      Pharynx: Oropharynx is clear.   Eyes:      Extraocular Movements: Extraocular movements intact.      Conjunctiva/sclera: Conjunctivae normal.      Pupils: Pupils are equal, round, and reactive to light.   Cardiovascular:      Rate and Rhythm: Normal rate and regular rhythm.      Pulses: Normal pulses.      Heart sounds: Normal heart sounds.   Pulmonary:      Effort: Pulmonary effort is normal.      Breath sounds: Normal breath sounds.   Abdominal:      General: Abdomen is flat. Bowel sounds are normal.      Palpations: Abdomen is soft.   Musculoskeletal:      Cervical back: Normal range of motion and neck supple.   Skin:     General: Skin is warm and dry.   Neurological:      General: No focal deficit present.      Mental Status: He is alert and oriented to person, place, and time.   Psychiatric:         Mood and Affect: Mood normal.         Last Recorded Vitals  Blood pressure 144/82, pulse 87, temperature 36.5 °C (97.7 °F), temperature source Oral, resp. rate 20, height 1.778 m (5' 10\"), weight 81.6 kg (180 lb), SpO2 100%.  Intake/Output last 3 Shifts:  I/O last 3 completed shifts:  In: 2436.3 (29.8 mL/kg) [P.O.:1560; IV Piggyback:876.3]  Out: 225 (2.8 mL/kg) [Urine:225 (0.1 mL/kg/hr)]  Weight: 81.6 kg     Relevant Results               Scheduled medications  busPIRone, 7.5 mg, oral, BID  cholecalciferol, 2,000 Units, oral, Daily  docusate sodium, 100 mg, oral, BID  enoxaparin, 40 mg, subcutaneous, Daily  fluticasone, 1 " spray, Each Nostril, Daily  gabapentin, 300 mg, oral, Nightly  melatonin, 3 mg, oral, Daily  mirtazapine, 15 mg, oral, Nightly  nystatin, 1 Application, Topical, BID  OLANZapine zydis, 5 mg, oral, Nightly  oxybutynin, 5 mg, oral, TID  pantoprazole, 40 mg, oral, Daily  polyethylene glycol, 17 g, oral, Daily      Continuous medications  potassium sqocjwo-G3-3.45%NaCl, 75 mL/hr, Last Rate: 75 mL/hr (03/04/25 2244)      PRN medications  PRN medications: acetaminophen **OR** acetaminophen **OR** acetaminophen, benzocaine-menthol, bisacodyl, dextromethorphan-guaifenesin, guaiFENesin, OLANZapine **OR** OLANZapine, ondansetron ODT  Results for orders placed or performed during the hospital encounter of 03/03/25 (from the past 24 hours)   CBC   Result Value Ref Range    WBC 7.0 4.4 - 11.3 x10*3/uL    nRBC 0.0 0.0 - 0.0 /100 WBCs    RBC 4.04 (L) 4.50 - 5.90 x10*6/uL    Hemoglobin 12.7 (L) 13.5 - 17.5 g/dL    Hematocrit 37.3 (L) 41.0 - 52.0 %    MCV 92 80 - 100 fL    MCH 31.4 26.0 - 34.0 pg    MCHC 34.0 32.0 - 36.0 g/dL    RDW 13.1 11.5 - 14.5 %    Platelets 292 150 - 450 x10*3/uL   Basic Metabolic Panel   Result Value Ref Range    Glucose 90 74 - 99 mg/dL    Sodium 138 136 - 145 mmol/L    Potassium 4.2 3.5 - 5.3 mmol/L    Chloride 105 98 - 107 mmol/L    Bicarbonate 27 21 - 32 mmol/L    Anion Gap 10 10 - 20 mmol/L    Urea Nitrogen 12 6 - 23 mg/dL    Creatinine 0.84 0.50 - 1.30 mg/dL    eGFR 83 >60 mL/min/1.73m*2    Calcium 8.6 8.6 - 10.3 mg/dL     US renal complete    Result Date: 3/4/2025  Interpreted By:  Corinne Schwartz, STUDY: US RENAL COMPLETE; 3/4/2025 8:31 am   INDICATION: Recurrent urinary tract infection   COMPARISON: None.   ACCESSION NUMBER(S): [id number]   ORDERING CLINICIAN: SHAVON RAMIRES   TECHNIQUE: Grayscale and color Doppler imaging of the kidneys.   FINDINGS: The right kidney measures 10.0 cm in length The left kidney measures 10.8 cm in length There is no shadowing calculus, hydronephrosis, or solid mass  identified. The renal cortical thickness and echogenicity is normal.   Bilateral ureteral jets are seen in urinary bladder. The prostate gland measures 2.9 x 3.7 x 5.2 cm in size. No bladder mass or bladder calculus is evident.       Normal ultrasound of the kidneys.   Enlarged prostate gland.   MACRO: None.   Signed by: Corinne Schwartz 3/4/2025 10:02 AM Dictation workstation:   [id number]                  Assessment/Plan   Assessment & Plan  Acute cystitis without hematuria    Gait instability    Memory impairment    Recurrent falls    NPH (normal pressure hydrocephalus) (Multi)    Patient remains quite confused  He is a fall risk  No definite abnormality noted in the x-rays and the labs  I had a long conversation with the wife  She says he falls because of NPH.  He does not have dementia but he has memory problems related to NPH   patient was not a candidate for shunt because of his age  She does not think going to rehab will help him  She wants therapy at home.  He brought him to the hospital because he fell and she could not get him up    Spoke to the son  Patient son does not want patient to get discharged today as they are not prepared for him  He does not want him to go to rehab but he wants him to be discharged home with home care  He is looking for some extra help [place] on aging for well check visits  He is looking for something of PureWick catheter so patient does not have to go to bathroom so often  Advised him to talk to       I spent  minutes in the professional and overall care of this patient.      Aicha Muñoz MD

## 2025-03-06 ENCOUNTER — DOCUMENTATION (OUTPATIENT)
Dept: HOME HEALTH SERVICES | Facility: HOME HEALTH | Age: OVER 89
End: 2025-03-06
Payer: MEDICARE

## 2025-03-06 VITALS
TEMPERATURE: 97.5 F | DIASTOLIC BLOOD PRESSURE: 76 MMHG | BODY MASS INDEX: 25.77 KG/M2 | HEIGHT: 70 IN | WEIGHT: 180 LBS | OXYGEN SATURATION: 98 % | RESPIRATION RATE: 18 BRPM | SYSTOLIC BLOOD PRESSURE: 131 MMHG | HEART RATE: 101 BPM

## 2025-03-06 PROCEDURE — 2500000002 HC RX 250 W HCPCS SELF ADMINISTERED DRUGS (ALT 637 FOR MEDICARE OP, ALT 636 FOR OP/ED)

## 2025-03-06 PROCEDURE — 97535 SELF CARE MNGMENT TRAINING: CPT | Mod: GO

## 2025-03-06 PROCEDURE — 96372 THER/PROPH/DIAG INJ SC/IM: CPT | Performed by: INTERNAL MEDICINE

## 2025-03-06 PROCEDURE — 2500000002 HC RX 250 W HCPCS SELF ADMINISTERED DRUGS (ALT 637 FOR MEDICARE OP, ALT 636 FOR OP/ED): Performed by: INTERNAL MEDICINE

## 2025-03-06 PROCEDURE — G0378 HOSPITAL OBSERVATION PER HR: HCPCS

## 2025-03-06 PROCEDURE — 97530 THERAPEUTIC ACTIVITIES: CPT | Mod: GO

## 2025-03-06 PROCEDURE — 99239 HOSP IP/OBS DSCHRG MGMT >30: CPT | Performed by: INTERNAL MEDICINE

## 2025-03-06 PROCEDURE — 2500000004 HC RX 250 GENERAL PHARMACY W/ HCPCS (ALT 636 FOR OP/ED): Performed by: INTERNAL MEDICINE

## 2025-03-06 PROCEDURE — 2500000001 HC RX 250 WO HCPCS SELF ADMINISTERED DRUGS (ALT 637 FOR MEDICARE OP): Performed by: INTERNAL MEDICINE

## 2025-03-06 RX ADMIN — DOCUSATE SODIUM 100 MG: 100 CAPSULE, LIQUID FILLED ORAL at 08:38

## 2025-03-06 RX ADMIN — DEXTROSE MONOHYDRATE, SODIUM CHLORIDE, AND POTASSIUM CHLORIDE 75 ML/HR: 50; 4.5; 1.49 INJECTION, SOLUTION INTRAVENOUS at 02:35

## 2025-03-06 RX ADMIN — BUSPIRONE HYDROCHLORIDE 7.5 MG: 5 TABLET ORAL at 08:38

## 2025-03-06 RX ADMIN — OXYBUTYNIN CHLORIDE 5 MG: 5 TABLET ORAL at 08:38

## 2025-03-06 RX ADMIN — FLUTICASONE PROPIONATE 1 SPRAY: 50 SPRAY, METERED NASAL at 08:41

## 2025-03-06 RX ADMIN — OLANZAPINE 2.5 MG: 2.5 TABLET, FILM COATED ORAL at 08:38

## 2025-03-06 RX ADMIN — POLYETHYLENE GLYCOL 3350 17 G: 17 POWDER, FOR SOLUTION ORAL at 08:38

## 2025-03-06 RX ADMIN — NYSTATIN 1 APPLICATION: 100000 CREAM TOPICAL at 08:42

## 2025-03-06 RX ADMIN — PANTOPRAZOLE SODIUM 40 MG: 20 TABLET, DELAYED RELEASE ORAL at 05:28

## 2025-03-06 RX ADMIN — ENOXAPARIN SODIUM 40 MG: 40 INJECTION SUBCUTANEOUS at 08:38

## 2025-03-06 RX ADMIN — Medication 2000 UNITS: at 08:38

## 2025-03-06 ASSESSMENT — PAIN SCALES - GENERAL
PAINLEVEL_OUTOF10: 0 - NO PAIN

## 2025-03-06 ASSESSMENT — COGNITIVE AND FUNCTIONAL STATUS - GENERAL
MOBILITY SCORE: 18
CLIMB 3 TO 5 STEPS WITH RAILING: A LOT
PERSONAL GROOMING: A LITTLE
DRESSING REGULAR UPPER BODY CLOTHING: A LITTLE
HELP NEEDED FOR BATHING: A LOT
STANDING UP FROM CHAIR USING ARMS: A LITTLE
DAILY ACTIVITIY SCORE: 15
DRESSING REGULAR LOWER BODY CLOTHING: A LITTLE
TOILETING: A LOT
MOVING TO AND FROM BED TO CHAIR: A LITTLE
TURNING FROM BACK TO SIDE WHILE IN FLAT BAD: A LITTLE
DRESSING REGULAR LOWER BODY CLOTHING: A LOT
HELP NEEDED FOR BATHING: A LITTLE
DAILY ACTIVITIY SCORE: 19
PERSONAL GROOMING: A LITTLE
DRESSING REGULAR UPPER BODY CLOTHING: A LITTLE
TOILETING: A LITTLE
WALKING IN HOSPITAL ROOM: A LITTLE
EATING MEALS: A LITTLE

## 2025-03-06 ASSESSMENT — PAIN - FUNCTIONAL ASSESSMENT
PAIN_FUNCTIONAL_ASSESSMENT: 0-10

## 2025-03-06 ASSESSMENT — ACTIVITIES OF DAILY LIVING (ADL): HOME_MANAGEMENT_TIME_ENTRY: 10

## 2025-03-06 NOTE — PROGRESS NOTES
Occupational Therapy    OT Treatment    Patient Name: Enmanuel Bhatia  MRN: [id number]  Department: [facility]  Room: [address] [place] [address]  Today's Date: 3/6/2025  Time Calculation  Start Time: 1029  Stop Time: 1053  Time Calculation (min): 24 min        Assessment:  OT Assessment: steady progress, required MAX A for LB dressing and toileting, MOD A for stand pivot transfers and MIN A for standing balance during functional tasks with use of FWW.  still limited by confusion, weakness and decreased balance  Prognosis: Fair  Barriers to Discharge Home: Cognition needs, Physical needs  Cognition Needs: 24hr supervision for safety awareness needed  Physical Needs: 24hr mobility assistance needed, 24hr ADL assistance needed  Evaluation/Treatment Tolerance: Patient limited by fatigue  Medical Staff Made Aware: Yes  End of Session Communication: Bedside nurse  End of Session Patient Position: Bed, 3 rail up, Alarm on  OT Assessment Results: Decreased ADL status, Decreased cognition, Decreased safe judgment during ADL, Decreased endurance, Decreased functional mobility  Prognosis: Fair  Evaluation/Treatment Tolerance: Patient limited by fatigue  Medical Staff Made Aware: Yes  Barriers to Participation: Ability to acquire knowledge, Comorbidities  Plan:  Treatment Interventions: ADL retraining, Functional transfer training, UE strengthening/ROM, Endurance training, Equipment evaluation/education, Compensatory technique education  OT Frequency: 3 times per week  OT Discharge Recommendations: Moderate intensity level of continued care, 24 hr supervision due to cognition  Equipment Recommended upon Discharge: Wheeled walker  OT Recommended Transfer Status: Assist of 1, Moderate assist  OT - OK to Discharge: Yes  Treatment Interventions: ADL retraining, Functional transfer training, UE strengthening/ROM, Endurance training, Equipment evaluation/education, Compensatory technique education    Subjective   Previous Visit Info:  OT Last Visit  OT  Received On: 03/06/25  General:  General  Reason for Referral: ADL impairment; 89 y/o male admitted with acute cystitis without hematuria; frequent falls  Past Medical History Relevant to Rehab: osteopenia; dementia; falls; BPH; GERD: depression; urinary incontinence  Family/Caregiver Present: No  Prior to Session Communication: Bedside nurse  Patient Position Received: Bed, 3 rail up, Alarm on  Preferred Learning Style: verbal, visual  General Comment: pt pleasantly confused, agreeable with therapy and cooperative  Precautions:  Medical Precautions: Fall precautions            Pain:  Pain Assessment  Pain Assessment: 0-10  0-10 (Numeric) Pain Score: 0 - No pain    Objective    Cognition:  Cognition  Overall Cognitive Status: Impaired at baseline  Orientation Level: Disoriented to place, Disoriented to time, Disoriented to situation  Following Commands: Follows one step commands with repetition  Cognition Comments: frequent cues for sequencing of tasks and for sustained attention  Sustained Attention: Impaired  Long-Term Memory: Impaired  Short-Term Memory: Impaired  Processing Speed: Delayed  Coordination:  Movements are Fluid and Coordinated: No  Activities of Daily Living: LE Dressing  LE Dressing: Yes  Sock Level of Assistance: Maximum assistance  Adult Briefs Level of Assistance: Maximum assistance  LE Dressing Where Assessed: Chair, Edge of bed  LE Dressing Comments: MAX A for donning xu socks while seated EOB after failed attempts.  pt required MAX A for donning brief including threading BLE and pulling up over hips but was able to assist with pulling up right side while standing with FWW    Toileting  Toileting Level of Assistance: Maximum assistance  Where Assessed:  (edge of bed)  Toileting Comments: pt with urine incontinence; required MAX A for changing of new brief; pt was able to stand with FWW at MIN A for steadying assist as therapist assisted with hygiene/donning brief  Functional Standing  Tolerance:  Time: 2 minute bouts  Activity: static standing with FWW and performed forward reaching while maintaining unilateral UE support on walker.  pt performed x15 reps x2 sets at MIN A.  required seated rest break between sets.  Bed Mobility/Transfers: Bed Mobility  Bed Mobility: Yes  Bed Mobility 1  Bed Mobility 1: Supine to sitting  Level of Assistance 1: Moderate assistance  Bed Mobility Comments 1: pt able to manage BLE to edge of bed with cueing, required MOD A to lifting trunk into seated position, effortful  Bed Mobility 2  Bed Mobility  2: Sitting to supine  Level of Assistance 2: Moderate assistance  Bed Mobility Comments 2: MOD A for lifting BLE back into bed and for lowering trunk; pt with fair efforts however limited by weakness    Transfer 1  Technique 1: Sit to stand  Transfer Device 1: Walker  Transfer Level of Assistance 1: Minimum assistance  Trials/Comments 1: multiple stands performed during session both from edge of bed and from standard chair with armrests.  pt required MIN A to ascend into standing position, frequent cues for hand placement and positioning when ascending/descending  Transfers 2  Technique 2: Stand pivot  Transfer Device 2: Walker  Transfer Level of Assistance 2: Moderate assistance  Trials/Comments 2: standing pivot from bed>chair and then from chair>bed at end of session.  performed with use of FWW however pt often attempting to push walker away and reach across for support; given cues throughout for proper technique.  pt requiring MOD A for steady assist and [place] assist for managing walke during pivot    Sitting Balance:  Static Sitting Balance  Static Sitting-Balance Support: Feet supported  Static Sitting-Level of Assistance: Close supervision  Standing Balance:  Static Standing Balance  Static Standing-Balance Support: Bilateral upper extremity supported  Static Standing-Level of Assistance: Minimum assistance      Outcome Measures:[facility] Daily Activity  Putting on and  taking off regular lower body clothing: A lot  Bathing (including washing, rinsing, drying): A lot  Putting on and taking off regular upper body clothing: A little  Toileting, which includes using toilet, bedpan or urinal: A lot  Taking care of personal grooming such as brushing teeth: A little  Eating Meals: A little  Daily Activity - Total Score: 15        Education Documentation  Body Mechanics, taught by Herminio Leon OT at 3/6/2025 11:15 AM.  Learner: Patient  Readiness: Acceptance  Method: Explanation, Demonstration  Response: Needs Reinforcement  Comment: ADL/functional mobility techniques, benefits of OOB activity, OT POC    ADL Training, taught by Herminio Leon OT at 3/6/2025 11:15 AM.  Learner: Patient  Readiness: Acceptance  Method: Explanation, Demonstration  Response: Needs Reinforcement  Comment: ADL/functional mobility techniques, benefits of OOB activity, OT POC    Education Comments  No comments found.        OP EDUCATION:       Goals:  Encounter Problems       Encounter Problems (Active)       ADLs       Patient with complete upper body dressing with minimal assist  level of assistance donning and doffing all UE clothes while edge of bed  (Progressing)       Start:  03/04/25    Expected End:  04/04/25            Patient with complete lower body dressing with minimal assist  level of assistance donning and doffing all LE clothes  with PRN adaptive equipment while edge of bed  (Progressing)       Start:  03/04/25    Expected End:  04/04/25            Patient will complete daily grooming tasks with supervision level of assistance and PRN adaptive equipment while standing. (Progressing)       Start:  03/04/25    Expected End:  04/04/25            Patient will complete toileting including hygiene clothing management/hygiene with minimal assist  level of assistance and raised toilet seat and grab bars. (Progressing)       Start:  03/04/25    Expected End:  04/04/25               TRANSFERS        Patient will complete functional transfer to toilet/commode with least restrictive device with contact guard assist level of assistance. (Progressing)       Start:  03/04/25    Expected End:  04/04/25

## 2025-03-06 NOTE — PROGRESS NOTES
03/06/25 1009   Discharge Planning   Expected Discharge Disposition Home H  ([facility])   Does the patient need discharge transport arranged? Yes   RoundTrip coordination needed? Yes     Tcc Spoke to son agreeable to dc today with [facility]   Danny rodriguez has set up [facility]HA for patient that will be with pt and wife   Patient will need stretcher transport, agreeable to cost    SOC 24-28hrs   NO BARRIERS TO DISCHARGE FROM CARE TRANSITIONS

## 2025-03-06 NOTE — NURSING NOTE
Wife was at the bedside. I spoke with care coordination and psych. Patient will be going home with home health. Therapy recommendation was for SNF. Family wants patient home.

## 2025-03-06 NOTE — CARE PLAN
The patient's goals for the shift include Help with adls    The clinical goals for the shift include Patient will remain safe from falling      Problem: Pain - Adult  Goal: Verbalizes/displays adequate comfort level or baseline comfort level  Outcome: Progressing     Problem: Safety - Adult  Goal: Free from fall injury  Outcome: Progressing     Problem: Discharge Planning  Goal: Discharge to home or other facility with appropriate resources  Outcome: Progressing     Problem: Chronic Conditions and Co-morbidities  Goal: Patient's chronic conditions and co-morbidity symptoms are monitored and maintained or improved  Outcome: Progressing     Problem: Nutrition  Goal: Nutrient intake appropriate for maintaining nutritional needs  Outcome: Progressing     Problem: Fall/Injury  Goal: Not fall by end of shift  Outcome: Progressing  Goal: Be free from injury by end of the shift  Outcome: Progressing  Goal: Verbalize understanding of personal risk factors for fall in the hospital  Outcome: Progressing  Goal: Verbalize understanding of risk factor reduction measures to prevent injury from fall in the home  Outcome: Progressing  Goal: Use assistive devices by end of the shift  Outcome: Progressing  Goal: Pace activities to prevent fatigue by end of the shift  Outcome: Progressing     Problem: Pain  Goal: Takes deep breaths with improved pain control throughout the shift  Outcome: Progressing  Goal: Turns in bed with improved pain control throughout the shift  Outcome: Progressing  Goal: Walks with improved pain control throughout the shift  Outcome: Progressing  Goal: Performs ADL's with improved pain control throughout shift  Outcome: Progressing  Goal: Participates in PT with improved pain control throughout the shift  Outcome: Progressing  Goal: Free from opioid side effects throughout the shift  Outcome: Progressing  Goal: Free from acute confusion related to pain meds throughout the shift  Outcome: Progressing

## 2025-03-06 NOTE — HH CARE COORDINATION
Home Care received a Referral for Nursing, Physical Therapy, and Occupational Therapy. We have processed the referral for a Start of Care on 3/7-3/8.     If you have any questions or concerns, please feel free to contact us at [phone]. Follow the prompts, enter your five digit zip code, and you will be directed to your care team on [facility] 1.

## 2025-03-06 NOTE — NURSING NOTE
Assumed care of patient. Patient is asleep in bed with bed alarm activated. Call light is within reach will continue to monitor.

## 2025-03-06 NOTE — CARE PLAN
The patient's goals for the shift include Help with adls    The clinical goals for the shift include pt will remain safe and free from injury    Over the shift, the patient did not make progress toward the following goals. Barriers to progression include . Recommendations to address these barriers include   Problem: Pain - Adult  Goal: Verbalizes/displays adequate comfort level or baseline comfort level  Outcome: Progressing     Problem: Safety - Adult  Goal: Free from fall injury  Outcome: Progressing     Problem: Nutrition  Goal: Nutrient intake appropriate for maintaining nutritional needs  Outcome: Progressing     Problem: Fall/Injury  Goal: Not fall by end of shift  Outcome: Progressing  Goal: Be free from injury by end of the shift  Outcome: Progressing  Goal: Verbalize understanding of personal risk factors for fall in the hospital  Outcome: Progressing  Goal: Verbalize understanding of risk factor reduction measures to prevent injury from fall in the home  Outcome: Progressing  Goal: Use assistive devices by end of the shift  Outcome: Progressing  Goal: Pace activities to prevent fatigue by end of the shift  Outcome: Progressing     Problem: Pain  Goal: Takes deep breaths with improved pain control throughout the shift  Outcome: Progressing  Goal: Turns in bed with improved pain control throughout the shift  Outcome: Progressing  Goal: Walks with improved pain control throughout the shift  Outcome: Progressing  Goal: Performs ADL's with improved pain control throughout shift  Outcome: Progressing  Goal: Participates in PT with improved pain control throughout the shift  Outcome: Progressing  Goal: Free from opioid side effects throughout the shift  Outcome: Progressing  Goal: Free from acute confusion related to pain meds throughout the shift  Outcome: Progressing   .

## 2025-03-06 NOTE — NURSING NOTE
Assumed care of patient. Patient is asleep in bed with bed alarm activated. Call light is within his reach will continue to monitor.

## 2025-03-07 NOTE — DISCHARGE SUMMARY
Discharge Diagnosis  Acute cystitis without hematuria    Issues Requiring Follow-Up  dementia    Test Results Pending At Discharge  Pending Labs       No current pending labs.            Hospital Course   Enmanuel Bhatia is a 89 y.o. male presenting with frequent falls and confusion.  Patient has past medical history of normal pressure hydrocephalus, BPH, gait instability, GERD, memory impairment/dementia, history of small bowel obstruction, quit smoking long time ago, lives with wife.  Patient is not a good historian.  In the ER found to have UTI and may need long-term placement   Urine culture was negative.  Patient is pretty much at his baseline.  Discussed with the wife and discussed with the son.  Home care arrange for patient to get therapy at home I will     Pertinent Physical Exam At Time of Discharge  Physical Exam  Vitals reviewed.   Constitutional:       Appearance: Normal appearance.   HENT:      Head: Normocephalic and atraumatic.      Right Ear: Tympanic membrane, ear canal and external ear normal.      Left Ear: Tympanic membrane, ear canal and external ear normal.      Nose: Nose normal.      Mouth/Throat:      Pharynx: Oropharynx is clear.   Eyes:      Extraocular Movements: Extraocular movements intact.      Conjunctiva/sclera: Conjunctivae normal.      Pupils: Pupils are equal, round, and reactive to light.   Cardiovascular:      Rate and Rhythm: Normal rate and regular rhythm.      Pulses: Normal pulses.      Heart sounds: Normal heart sounds.   Pulmonary:      Effort: Pulmonary effort is normal.      Breath sounds: Normal breath sounds.   Abdominal:      General: Abdomen is flat. Bowel sounds are normal.      Palpations: Abdomen is soft.   Musculoskeletal:      Cervical back: Normal range of motion and neck supple.   Skin:     General: Skin is warm and dry.   Neurological:      General: No focal deficit present.      Mental Status: He is alert and oriented to person, place, and time.    Psychiatric:         Mood and Affect: Mood normal.         Home Medications     Medication List      CHANGE how you take these medications     OLANZapine 2.5 mg tablet; Commonly known as: ZyPREXA; Take 1 tablet (2.5   mg) by mouth every 8 hours if needed (agitation).; What changed: Another   medication with the same name was removed. Continue taking this   medication, and follow the directions you see here.; Notes to patient: IF   NEEDED     CONTINUE taking these medications     acetaminophen 325 mg tablet; Commonly known as: Tylenol; Take 2 tablets   (650 mg) by mouth every 4 hours if needed for fever (temp greater than   38.0 C) (greater than or equal to 38 C).; Notes to patient: IF NEEDED   bisacodyl 5 mg EC tablet; Commonly known as: Dulcolax; Take 2 tablets   (10 mg) by mouth once daily as needed for constipation. Do not crush,   chew, or split.; Notes to patient: IF NEEDED   busPIRone 7.5 mg tablet; Commonly known as: Buspar; Take 1 tablet (7.5   mg) by mouth 2 times a day.; Notes to patient: 2 TIMES DAILY   cholecalciferol 125 mcg (5000 UT) capsule; Commonly known as: Vitamin   D-3; Notes to patient: ONCE DAILY   cyanocobalamin 1,000 mcg tablet; Commonly known as: Vitamin B-12; Notes   to patient: ONCE DAILY   enoxaparin 40 mg/0.4 mL syringe; Commonly known as: Lovenox; Inject 0.4   mL (40 mg) under the skin once daily.; Notes to patient: ONCE DAILY   fluticasone 50 mcg/actuation nasal spray; Commonly known as: Flonase;   Notes to patient: ONCE DAILY   gabapentin 300 mg capsule; Commonly known as: Neurontin; Notes to   patient: ONCE DAILY, AT BEDTIME   nystatin cream; Commonly known as: Mycostatin; Notes to patient: 2 TIMES   DAILY   omeprazole 20 mg DR capsule; Commonly known as: PriLOSEC; Notes to   patient: ONCE DAILY, IN THE MORNING   ondansetron ODT 4 mg disintegrating tablet; Commonly known as:   Zofran-ODT; Dissolve 1 tablet (4 mg) in the mouth every 8 hours if needed   for nausea.; Notes to patient:  IF NEEDED   oxybutynin XL 5 mg 24 hr tablet; Commonly known as: Ditropan-XL; Notes   to patient: ONCE DAILY   Remeron 15 mg tablet; Generic drug: mirtazapine; Notes to patient: ONCE   DAILY, AT BEDTIME     STOP taking these medications     trospium 20 mg tablet; Commonly known as: Sanctura       Outpatient Follow-Up  No future appointments.    Aicha Muñoz MD

## 2025-03-07 NOTE — PROGRESS NOTES
"Enmanuel Bhatia is a 89 y.o. male on day 0 of admission presenting with Acute cystitis without hematuria.    Subjective   Patient remains pleasantly confused       Objective     Physical Exam  Vitals reviewed.   Constitutional:       Appearance: Normal appearance.   HENT:      Head: Normocephalic and atraumatic.      Right Ear: Tympanic membrane, ear canal and external ear normal.      Left Ear: Tympanic membrane, ear canal and external ear normal.      Nose: Nose normal.      Mouth/Throat:      Pharynx: Oropharynx is clear.   Eyes:      Extraocular Movements: Extraocular movements intact.      Conjunctiva/sclera: Conjunctivae normal.      Pupils: Pupils are equal, round, and reactive to light.   Cardiovascular:      Rate and Rhythm: Normal rate and regular rhythm.      Pulses: Normal pulses.      Heart sounds: Normal heart sounds.   Pulmonary:      Effort: Pulmonary effort is normal.      Breath sounds: Normal breath sounds.   Abdominal:      General: Abdomen is flat. Bowel sounds are normal.      Palpations: Abdomen is soft.   Musculoskeletal:      Cervical back: Normal range of motion and neck supple.   Skin:     General: Skin is warm and dry.   Neurological:      General: No focal deficit present.      Mental Status: He is alert and oriented to person, place, and time.   Psychiatric:         Mood and Affect: Mood normal.         Last Recorded Vitals  Blood pressure 131/76, pulse 101, temperature 36.4 °C (97.5 °F), temperature source Axillary, resp. rate 18, height 1.778 m (5' 10\"), weight 81.6 kg (180 lb), SpO2 98%.  Intake/Output last 3 Shifts:  I/O last 3 completed shifts:  In: 580 (7.1 mL/kg) [P.O.:580]  Out: 1000 (12.2 mL/kg) [Urine:1000 (0.3 mL/kg/hr)]  Weight: 81.6 kg     Relevant Results               Scheduled medications                       Assessment/Plan   Assessment & Plan  Acute cystitis without hematuria    Gait instability    Memory impairment    Recurrent falls    NPH (normal pressure " hydrocephalus) (Multi)    Patient remains quite confused  He is a fall risk  No definite abnormality noted in the x-rays and the labs  I had a long conversation with the wife  She says he falls because of NPH.  He does not have dementia but he has memory problems related to NPH   patient was not a candidate for shunt because of his age  She does not think going to rehab will help him  She wants therapy at home.  He brought him to the hospital because he fell and she could not get him up    Spoke to the son  Patient son does not want patient to get discharged today as they are not prepared for him  He does not want him to go to rehab but he wants him to be discharged home with home care  He is looking for some extra help [employer] on aging for well check visits  He is looking for something of PureWick catheter so patient does not have to go to bathroom so often  Advised him to talk to     3/6/2025   patient is stable at his baseline    Plan is to discharge him home with home care      I spent  minutes in the professional and overall care of this patient.      Aicha Muñoz MD

## 2025-03-11 ENCOUNTER — PATIENT OUTREACH (OUTPATIENT)
Dept: CARE COORDINATION | Age: OVER 89
End: 2025-03-11
Payer: MEDICARE

## 2025-03-11 ENCOUNTER — DOCUMENTATION (OUTPATIENT)
Dept: HOME HEALTH SERVICES | Facility: HOME HEALTH | Age: OVER 89
End: 2025-03-11
Payer: MEDICARE

## 2025-03-11 ENCOUNTER — HOME CARE VISIT (OUTPATIENT)
Dept: HOME HEALTH SERVICES | Facility: HOME HEALTH | Age: OVER 89
End: 2025-03-11

## 2025-03-11 SDOH — HEALTH STABILITY: PHYSICAL HEALTH: ON AVERAGE, HOW MANY DAYS PER WEEK DO YOU ENGAGE IN MODERATE TO STRENUOUS EXERCISE (LIKE A BRISK WALK)?: 2 DAYS

## 2025-03-11 SDOH — HEALTH STABILITY: PHYSICAL HEALTH: ON AVERAGE, HOW MANY MINUTES DO YOU ENGAGE IN EXERCISE AT THIS LEVEL?: 10 MIN

## 2025-03-11 NOTE — HH CARE COORDINATION
This referral has been made a Non Admit with  Home Care due to Patient is Active with Another Home Care Agency. If you have further questions, feel free to reach out to our office at [phone]. Thank you, [facility] Intake.    Provider Notification:  Sent In Basket Dr. Muñoz informing that [facility] has not accepted patient referral.

## 2025-03-12 ENCOUNTER — APPOINTMENT (OUTPATIENT)
Dept: HOME HEALTH SERVICES | Facility: HOME HEALTH | Age: OVER 89
End: 2025-03-12
Payer: MEDICARE

## 2025-03-24 ENCOUNTER — APPOINTMENT (OUTPATIENT)
Dept: RADIOLOGY | Facility: HOSPITAL | Age: OVER 89
End: 2025-03-24
Payer: MEDICARE

## 2025-03-24 ENCOUNTER — HOSPITAL ENCOUNTER (EMERGENCY)
Facility: HOSPITAL | Age: OVER 89
Discharge: HOME | End: 2025-03-25
Attending: STUDENT IN AN ORGANIZED HEALTH CARE EDUCATION/TRAINING PROGRAM
Payer: MEDICARE

## 2025-03-24 DIAGNOSIS — R53.1 GENERALIZED WEAKNESS: Primary | ICD-10-CM

## 2025-03-24 DIAGNOSIS — R32 URINARY INCONTINENCE, UNSPECIFIED TYPE: ICD-10-CM

## 2025-03-24 LAB
ANION GAP SERPL CALCULATED.3IONS-SCNC: 9 MMOL/L (ref 10–20)
BASOPHILS # BLD AUTO: 0.04 X10*3/UL (ref 0–0.1)
BASOPHILS NFR BLD AUTO: 0.6 %
BNP SERPL-MCNC: 41 PG/ML (ref 0–99)
BUN SERPL-MCNC: 15 MG/DL (ref 6–23)
CALCIUM SERPL-MCNC: 9.1 MG/DL (ref 8.6–10.3)
CARDIAC TROPONIN I PNL SERPL HS: 4 NG/L (ref 0–20)
CHLORIDE SERPL-SCNC: 104 MMOL/L (ref 98–107)
CO2 SERPL-SCNC: 28 MMOL/L (ref 21–32)
CREAT SERPL-MCNC: 0.98 MG/DL (ref 0.5–1.3)
EGFRCR SERPLBLD CKD-EPI 2021: 73 ML/MIN/1.73M*2
EOSINOPHIL # BLD AUTO: 0.26 X10*3/UL (ref 0–0.4)
EOSINOPHIL NFR BLD AUTO: 4 %
ERYTHROCYTE [DISTWIDTH] IN BLOOD BY AUTOMATED COUNT: 12.8 % (ref 11.5–14.5)
FLUAV RNA RESP QL NAA+PROBE: NOT DETECTED
FLUBV RNA RESP QL NAA+PROBE: NOT DETECTED
GLUCOSE SERPL-MCNC: 113 MG/DL (ref 74–99)
HCT VFR BLD AUTO: 44.1 % (ref 41–52)
HGB BLD-MCNC: 15 G/DL (ref 13.5–17.5)
IMM GRANULOCYTES # BLD AUTO: 0.04 X10*3/UL (ref 0–0.5)
IMM GRANULOCYTES NFR BLD AUTO: 0.6 % (ref 0–0.9)
LYMPHOCYTES # BLD AUTO: 1.42 X10*3/UL (ref 0.8–3)
LYMPHOCYTES NFR BLD AUTO: 21.9 %
MCH RBC QN AUTO: 31.4 PG (ref 26–34)
MCHC RBC AUTO-ENTMCNC: 34 G/DL (ref 32–36)
MCV RBC AUTO: 92 FL (ref 80–100)
MONOCYTES # BLD AUTO: 0.47 X10*3/UL (ref 0.05–0.8)
MONOCYTES NFR BLD AUTO: 7.3 %
NEUTROPHILS # BLD AUTO: 4.24 X10*3/UL (ref 1.6–5.5)
NEUTROPHILS NFR BLD AUTO: 65.6 %
NRBC BLD-RTO: 0 /100 WBCS (ref 0–0)
PLATELET # BLD AUTO: 300 X10*3/UL (ref 150–450)
POTASSIUM SERPL-SCNC: 4.4 MMOL/L (ref 3.5–5.3)
RBC # BLD AUTO: 4.78 X10*6/UL (ref 4.5–5.9)
RSV RNA RESP QL NAA+PROBE: NOT DETECTED
SARS-COV-2 RNA RESP QL NAA+PROBE: NOT DETECTED
SODIUM SERPL-SCNC: 137 MMOL/L (ref 136–145)
WBC # BLD AUTO: 6.5 X10*3/UL (ref 4.4–11.3)

## 2025-03-24 PROCEDURE — 83880 ASSAY OF NATRIURETIC PEPTIDE: CPT | Performed by: STUDENT IN AN ORGANIZED HEALTH CARE EDUCATION/TRAINING PROGRAM

## 2025-03-24 PROCEDURE — 71045 X-RAY EXAM CHEST 1 VIEW: CPT | Performed by: STUDENT IN AN ORGANIZED HEALTH CARE EDUCATION/TRAINING PROGRAM

## 2025-03-24 PROCEDURE — 85025 COMPLETE CBC W/AUTO DIFF WBC: CPT | Performed by: STUDENT IN AN ORGANIZED HEALTH CARE EDUCATION/TRAINING PROGRAM

## 2025-03-24 PROCEDURE — 80048 BASIC METABOLIC PNL TOTAL CA: CPT | Performed by: STUDENT IN AN ORGANIZED HEALTH CARE EDUCATION/TRAINING PROGRAM

## 2025-03-24 PROCEDURE — 36415 COLL VENOUS BLD VENIPUNCTURE: CPT | Performed by: STUDENT IN AN ORGANIZED HEALTH CARE EDUCATION/TRAINING PROGRAM

## 2025-03-24 PROCEDURE — 99285 EMERGENCY DEPT VISIT HI MDM: CPT | Mod: 25 | Performed by: STUDENT IN AN ORGANIZED HEALTH CARE EDUCATION/TRAINING PROGRAM

## 2025-03-24 PROCEDURE — 87637 SARSCOV2&INF A&B&RSV AMP PRB: CPT | Performed by: STUDENT IN AN ORGANIZED HEALTH CARE EDUCATION/TRAINING PROGRAM

## 2025-03-24 PROCEDURE — 71045 X-RAY EXAM CHEST 1 VIEW: CPT

## 2025-03-24 PROCEDURE — 84484 ASSAY OF TROPONIN QUANT: CPT | Performed by: STUDENT IN AN ORGANIZED HEALTH CARE EDUCATION/TRAINING PROGRAM

## 2025-03-25 ENCOUNTER — APPOINTMENT (OUTPATIENT)
Dept: CARDIOLOGY | Facility: HOSPITAL | Age: OVER 89
End: 2025-03-25
Payer: MEDICARE

## 2025-03-25 VITALS
OXYGEN SATURATION: 94 % | BODY MASS INDEX: 25.77 KG/M2 | TEMPERATURE: 98.2 F | WEIGHT: 180 LBS | HEIGHT: 70 IN | SYSTOLIC BLOOD PRESSURE: 118 MMHG | HEART RATE: 83 BPM | DIASTOLIC BLOOD PRESSURE: 67 MMHG | RESPIRATION RATE: 16 BRPM

## 2025-03-25 LAB
APPEARANCE UR: CLEAR
ATRIAL RATE: 70 BPM
BILIRUB UR STRIP.AUTO-MCNC: NEGATIVE MG/DL
CARDIAC TROPONIN I PNL SERPL HS: 4 NG/L (ref 0–20)
COLOR UR: ABNORMAL
GLUCOSE UR STRIP.AUTO-MCNC: NORMAL MG/DL
HOLD SPECIMEN: NORMAL
KETONES UR STRIP.AUTO-MCNC: NEGATIVE MG/DL
LEUKOCYTE ESTERASE UR QL STRIP.AUTO: NEGATIVE
NITRITE UR QL STRIP.AUTO: NEGATIVE
P AXIS: 47 DEGREES
P OFFSET: 193 MS
P ONSET: 148 MS
PH UR STRIP.AUTO: 5.5 [PH]
PR INTERVAL: 132 MS
PROT UR STRIP.AUTO-MCNC: NEGATIVE MG/DL
Q ONSET: 214 MS
QRS COUNT: 12 BEATS
QRS DURATION: 88 MS
QT INTERVAL: 392 MS
QTC CALCULATION(BAZETT): 423 MS
QTC FREDERICIA: 412 MS
R AXIS: -35 DEGREES
RBC # UR STRIP.AUTO: ABNORMAL MG/DL
RBC #/AREA URNS AUTO: NORMAL /HPF
SP GR UR STRIP.AUTO: 1.01
T AXIS: 27 DEGREES
T OFFSET: 410 MS
UROBILINOGEN UR STRIP.AUTO-MCNC: NORMAL MG/DL
VENTRICULAR RATE: 70 BPM
WBC #/AREA URNS AUTO: NORMAL /HPF

## 2025-03-25 PROCEDURE — 84484 ASSAY OF TROPONIN QUANT: CPT | Performed by: STUDENT IN AN ORGANIZED HEALTH CARE EDUCATION/TRAINING PROGRAM

## 2025-03-25 PROCEDURE — 93005 ELECTROCARDIOGRAM TRACING: CPT

## 2025-03-25 PROCEDURE — 81001 URINALYSIS AUTO W/SCOPE: CPT | Performed by: STUDENT IN AN ORGANIZED HEALTH CARE EDUCATION/TRAINING PROGRAM

## 2025-03-25 PROCEDURE — 36415 COLL VENOUS BLD VENIPUNCTURE: CPT | Performed by: STUDENT IN AN ORGANIZED HEALTH CARE EDUCATION/TRAINING PROGRAM

## 2025-03-25 NOTE — DISCHARGE INSTRUCTIONS
Follow-up with your primary physician for any ongoing management of your symptoms.  No abnormalities were found on your workup today.  There is no UTI, no evidence of any heart issues or acute infection, your blood counts and electrolytes are all normal and there is no evidence of viral infection or pneumonia.

## 2025-03-25 NOTE — ED PROVIDER NOTES
This is a 9-year-old male presenting to the ED for evaluation of general weakness.  He states that he has been feeling weak since he was seen last Friday, otherwise he feels well.  He denies any pain, difficulty breathing, dizziness or lightheadedness.  He states he is eating and drinking well at home and functioning at his usual baseline.  He was diagnosed with acute on 3/30 and admitted at that time, after which she was discharged home.  He states that an alarm went off on his bed and told him something was wrong so he came to the ED tonight.  He is not sure what the alarm is for, or what may be wrong although he reports that he is still feeling generally weak.    The patient's vital signs are normal in the ED.  He is awake and alert, pleasantly demented on examination.  He follows all commands, neurologic exam shows no focal deficits, NIHSS is 0.  A broad laboratory workup was ordered to assess for multiple possible causes of weakness including infection, metabolic derangement, ACS, heart failure, viral infection all of which were unremarkable.  It is unclear what the alarm is that is on the patient's bed, he does not appear to have a history of severe heart failure that he would have a CardioMEMS device or any documentation of any monitoring device on review of outpatient notes.  Results were discussed with the patient, no abnormality was found on his workup today.  Blood signs remained stable.  He remains without any acute complaints.  He was discharged back home in stable condition, advised to follow-up as an outpatient with his PCP and to return if he has any new or worsening symptoms that he feel require emergent evaluation by physician    Physical Exam  General: well developed, well nourished 89-year-old male who is awake and alert, in no apparent distress  Eyes: sclera clear bilaterally, PERRL, EOMI  HENT: normocephalic, atraumatic. Pharynx without erythema or exudates, uvula midline.  CV: regular rate and  rhythm, systolic murmur present, no gallops, or rubs.   Resp: clear to ascultation bilaterally, no wheezes, rales, or rhonchi  GI: abdomen soft, nontender without rigidity or guarding, no peritoneal signs, abdomen is nondistended, no masses palpated  MSK: strength +5/5 to upper and lower extremities bilaterally, no swelling of the extremities.  Neuro: no focal deficits, CN2-12 intact. Sensation fully intact.  NIHSS 0  Psych: Pleasantly demented, calm and cooperative with exam  Skin: warm, dry, without evidence of rash or abrasions    Labs Reviewed   BASIC METABOLIC PANEL - Abnormal       Result Value    Glucose 113 (*)     Sodium 137      Potassium 4.4      Chloride 104      Bicarbonate 28      Anion Gap 9 (*)     Urea Nitrogen 15      Creatinine 0.98      eGFR 73      Calcium 9.1     URINALYSIS WITH REFLEX CULTURE AND MICROSCOPIC - Abnormal    Color, Urine Light-Yellow      Appearance, Urine Clear      Specific Gravity, Urine 1.014      pH, Urine 5.5      Protein, Urine NEGATIVE      Glucose, Urine Normal      Blood, Urine 0.03 (TRACE) (*)     Ketones, Urine NEGATIVE      Bilirubin, Urine NEGATIVE      Urobilinogen, Urine Normal      Nitrite, Urine NEGATIVE      Leukocyte Esterase, Urine NEGATIVE     B-TYPE NATRIURETIC PEPTIDE - Normal    BNP 41      Narrative:        <100 pg/mL - Heart failure unlikely  100-299 pg/mL - Intermediate probability of acute heart                  failure exacerbation. Correlate with clinical                  context and patient history.    >=300 pg/mL - Heart Failure likely. Correlate with clinical                  context and patient history.    BNP testing is performed using different testing methodology at [facility] than at other [facility]. Direct result comparisons should only be made within the same method.      SARS-COV-2, INFLUENZA A/B AND RSV PCR - Normal    Coronavirus 2019, PCR Not Detected      Flu A Result Not Detected      Flu B Result Not Detected       RSV PCR Not Detected      Narrative:     This assay is an FDA-cleared, in vitro diagnostic nucleic acid amplification test for the qualitative detection and differentiation of SARS CoV-2/ Influenza A/B/ RSV from nasopharyngeal specimens collected from individuals with signs and symptoms of respiratory tract infections, and has been validated for use at [facility]. Negative results do not preclude COVID-19/ Influenza A/B/ RSV infections and should not be used as the sole basis for diagnosis, treatment, or other management decisions. Testing for SARS CoV-2 is recommended only for patients who meet current clinical and/or epidemiological criteria defined by federal, state, or local public health directives.   SERIAL TROPONIN-INITIAL - Normal    Troponin I, High Sensitivity 4      Narrative:     Less than 99th percentile of normal range cutoff-  Female and children under 18 years old <14 ng/L; Male <21 ng/L: Negative  Repeat testing should be performed if clinically indicated.     Female and children under 18 years old 14-50 ng/L; Male 21-50 ng/L:  Consistent with possible cardiac damage and possible increased clinical   risk. Serial measurements may help to assess extent of myocardial damage.     >50 ng/L: Consistent with cardiac damage, increased clinical risk and  myocardial infarction. Serial measurements may help assess extent of   myocardial damage.      NOTE: Children less than 1 year old may have higher baseline troponin   levels and results should be interpreted in conjunction with the overall   clinical context.     NOTE: Troponin I testing is performed using a different   testing methodology at [facility] than at other   [facility]. Direct result comparisons should only   be made within the same method.   SERIAL TROPONIN, 1 HOUR - Normal    Troponin I, High Sensitivity 4      Narrative:     Less than 99th percentile of normal range cutoff-  Female and children  under 18 years old <14 ng/L; Male <21 ng/L: Negative  Repeat testing should be performed if clinically indicated.     Female and children under 18 years old 14-50 ng/L; Male 21-50 ng/L:  Consistent with possible cardiac damage and possible increased clinical   risk. Serial measurements may help to assess extent of myocardial damage.     >50 ng/L: Consistent with cardiac damage, increased clinical risk and  myocardial infarction. Serial measurements may help assess extent of   myocardial damage.      NOTE: Children less than 1 year old may have higher baseline troponin   levels and results should be interpreted in conjunction with the overall   clinical context.     NOTE: Troponin I testing is performed using a different   testing methodology at [facility] than at other   [facility]. Direct result comparisons should only   be made within the same method.   URINALYSIS MICROSCOPIC WITH REFLEX CULTURE - Normal    WBC, Urine NONE      RBC, Urine 1-2     CBC WITH AUTO DIFFERENTIAL    WBC 6.5      nRBC 0.0      RBC 4.78      Hemoglobin 15.0      Hematocrit 44.1      MCV 92      MCH 31.4      MCHC 34.0      RDW 12.8      Platelets 300      Neutrophils % 65.6      Immature Granulocytes %, Automated 0.6      Lymphocytes % 21.9      Monocytes % 7.3      Eosinophils % 4.0      Basophils % 0.6      Neutrophils Absolute 4.24      Immature Granulocytes Absolute, Automated 0.04      Lymphocytes Absolute 1.42      Monocytes Absolute 0.47      Eosinophils Absolute 0.26      Basophils Absolute 0.04     TROPONIN SERIES- (INITIAL, 1 HR)    Narrative:     The following orders were created for panel order Troponin I Series, High Sensitivity (0, 1 HR).  Procedure                               Abnormality         Status                     ---------                               -----------         ------                     Troponin I, High Sensiti...[[id number]]  Normal              Final result               Troponin,  High Sensitivi...[[id number]]  Normal              Final result                 Please view results for these tests on the individual orders.   URINALYSIS WITH REFLEX CULTURE AND MICROSCOPIC    Narrative:     The following orders were created for panel order Urinalysis with Reflex Culture and Microscopic.  Procedure                               Abnormality         Status                     ---------                               -----------         ------                     Urinalysis with Reflex C...[[id number]]  Abnormal            Final result               Extra Urine Gray Tube[[id number]]                            In process                   Please view results for these tests on the individual orders.   EXTRA URINE GRAY TUBE     XR chest 1 view   Final Result   No acute cardiopulmonary process.             MACRO:   None.        Signed by: Austin Bailey 3/25/2025 12:10 AM   Dictation workstation:   [id number]               Dylan Fajardo DO  03/25/25 3206

## 2025-03-25 NOTE — ED PROVIDER NOTES
HPI   Chief Complaint   Patient presents with    Weakness, Gen       HPI  See my MDM      Patient History   Past Medical History:   Diagnosis Date    At risk for falling     Benign prostatic hyperplasia without lower urinary tract symptoms     Enlarged prostate    Cognitive decline     Depression     Gait instability     GERD (gastroesophageal reflux disease)     Hydrocephalus     Memory impairment     Paresthesias     Personal history of other diseases of the musculoskeletal system and connective tissue     History of arthritis    Small bowel obstruction (Multi)     Urinary incontinence     Weakness      Past Surgical History:   Procedure Laterality Date    COLON SURGERY      OTHER SURGICAL HISTORY  07/22/2019    No history of surgery     No family history on file.  Social History     Tobacco Use    Smoking status: Former     Types: Cigarettes    Smokeless tobacco: Never   Substance Use Topics    Alcohol use: Not Currently    Drug use: Never       Physical Exam   ED Triage Vitals [03/24/25 2258]   Temperature Heart Rate Respirations BP   36.8 °C (98.2 °F) 75 19 112/64      Pulse Ox Temp src Heart Rate Source Patient Position   (!) 93 % -- -- --      BP Location FiO2 (%)     -- --       Physical Exam  CONSTITUTIONAL: Vital signs reviewed as charted, well-developed and in no distress  Eyes: Extraocular muscles are intact. Pupils equal round and reactive to light. Conjunctiva are pink.    ENT: Mucous membranes are moist. Tongue in the midline. Pharynx was without erythema or exudates, uvula midline  LUNGS: Breath sounds equal and clear to auscultation. Good air exchange, no wheezes rales or retractions, pulse oximetry is charted.  HEART: Regular rate and rhythm without murmur thrill or rub, strong tones, auscultation is normal.  ABDOMEN: Soft and nontender without guarding rebound rigidity or mass. Bowel sounds are present and normal in all quadrants. There is no palpable masses or aneurysms identified. No  hepatosplenomegaly, normal abdominal exam.  Neuro: The patient is awake, alert and oriented ×3. Moving all 4 extremities and answering questions appropriately.   MUSCULOSKELETAL: The calves are nontender to palpation. Full gross active range of motion.   PSYCH: Awake alert oriented, normal mood and affect.  Skin:  Dry, normal color, warm to the touch, no rash present.        ED Course & MDM   ED Course as of 03/25/25 0038   Mon Mar 24, 2025   2306 EKG interpretation shows sinus rhythm with PACs, rate of 70 beats minute.  Left axis deviation.  QTc 423 ms, [place] of 132.  No ST elevation or depression, no acute appearing STEMI. [NT]      ED Course User Index  [NT] Dylan HOPE Scotty,                  No data recorded     [place] Coma Scale Score: 14 (03/24/25 2258 : Fatemeh Grigsby RN)                           Medical Decision Making  History obtained from: patient    Vital signs, nursing notes, current medications, past medical history, Surgical history, allergies, social history, family History were reviewed.         HPI:  Patient 89-year-old gentleman presenting emergency room today complaining of generalized weakness.  States been feeling weak for the last couple of days.  States kind of alarm went off in his bed so I sent him into the ER.  Denies fever chills or night sweats.  Denies nausea vomiting diarrhea.  States been eating and drinking normally.  Vital signs within normal limits      10 point ROS was reviewed and negative except Noted above in HPI.  DDX: as listed above          MDM Summary/considerations:  Labs Reviewed   BASIC METABOLIC PANEL - Abnormal       Result Value    Glucose 113 (*)     Sodium 137      Potassium 4.4      Chloride 104      Bicarbonate 28      Anion Gap 9 (*)     Urea Nitrogen 15      Creatinine 0.98      eGFR 73      Calcium 9.1     B-TYPE NATRIURETIC PEPTIDE - Normal    BNP 41      Narrative:        <100 pg/mL - Heart failure unlikely  100-299 pg/mL - Intermediate probability of  acute heart                  failure exacerbation. Correlate with clinical                  context and patient history.    >=300 pg/mL - Heart Failure likely. Correlate with clinical                  context and patient history.    BNP testing is performed using different testing methodology at [facility] than at other [facility] hospitals. Direct result comparisons should only be made within the same method.      SARS-COV-2, INFLUENZA A/B AND RSV PCR - Normal    Coronavirus 2019, PCR Not Detected      Flu A Result Not Detected      Flu B Result Not Detected      RSV PCR Not Detected      Narrative:     This assay is an FDA-cleared, in vitro diagnostic nucleic acid amplification test for the qualitative detection and differentiation of SARS CoV-2/ Influenza A/B/ RSV from nasopharyngeal specimens collected from individuals with signs and symptoms of respiratory tract infections, and has been validated for use at [facility]. Negative results do not preclude COVID-19/ Influenza A/B/ RSV infections and should not be used as the sole basis for diagnosis, treatment, or other management decisions. Testing for SARS CoV-2 is recommended only for patients who meet current clinical and/or epidemiological criteria defined by federal, state, or local public health directives.   SERIAL TROPONIN-INITIAL - Normal    Troponin I, High Sensitivity 4      Narrative:     Less than 99th percentile of normal range cutoff-  Female and children under 18 years old <14 ng/L; Male <21 ng/L: Negative  Repeat testing should be performed if clinically indicated.     Female and children under 18 years old 14-50 ng/L; Male 21-50 ng/L:  Consistent with possible cardiac damage and possible increased clinical   risk. Serial measurements may help to assess extent of myocardial damage.     >50 ng/L: Consistent with cardiac damage, increased clinical risk and  myocardial infarction. Serial measurements may help assess  extent of   myocardial damage.      NOTE: Children less than 1 year old may have higher baseline troponin   levels and results should be interpreted in conjunction with the overall   clinical context.     NOTE: Troponin I testing is performed using a different   testing methodology at [facility] than at other   [facility]. Direct result comparisons should only   be made within the same method.   CBC WITH AUTO DIFFERENTIAL    WBC 6.5      nRBC 0.0      RBC 4.78      Hemoglobin 15.0      Hematocrit 44.1      MCV 92      MCH 31.4      MCHC 34.0      RDW 12.8      Platelets 300      Neutrophils % 65.6      Immature Granulocytes %, Automated 0.6      Lymphocytes % 21.9      Monocytes % 7.3      Eosinophils % 4.0      Basophils % 0.6      Neutrophils Absolute 4.24      Immature Granulocytes Absolute, Automated 0.04      Lymphocytes Absolute 1.42      Monocytes Absolute 0.47      Eosinophils Absolute 0.26      Basophils Absolute 0.04     URINALYSIS WITH REFLEX CULTURE AND MICROSCOPIC    Narrative:     The following orders were created for panel order Urinalysis with Reflex Culture and Microscopic.  Procedure                               Abnormality         Status                     ---------                               -----------         ------                     Urinalysis with Reflex C...[[id number]]                                                 Extra Urine Gray Tube[[id number]]                                                         Please view results for these tests on the individual orders.   TROPONIN SERIES- (INITIAL, 1 HR)    Narrative:     The following orders were created for panel order Troponin I Series, High Sensitivity (0, 1 HR).  Procedure                               Abnormality         Status                     ---------                               -----------         ------                     Troponin I, High Sensiti...[[id number]]  Normal              Final result                Troponin, High Sensitivi...[[id number]]                                                   Please view results for these tests on the individual orders.   URINALYSIS WITH REFLEX CULTURE AND MICROSCOPIC   EXTRA URINE GRAY TUBE   SERIAL TROPONIN, 1 HOUR     XR chest 1 view   Final Result   No acute cardiopulmonary process.             MACRO:   None.        Signed by: Austin Bailey 3/25/2025 12:10 AM   Dictation workstation:   [id number]        Medications - No data to display  New Prescriptions    No medications on file       Care was transferred to Dr. Fajardo pending urinalysis.      I saw this patient in conjunction with Dr. Fajardo, please see his supervision note.    Critical Care: Not warranted at this time        This chart was completed using voice recognition transcription software. Please excuse any errors of transcription including grammatical, punctuation, syntax and spelling errors.  Please contact me with any questions regarding this chart.    Procedure  Procedures     JOSÉ Avila-CNP  03/25/25 0038

## 2025-03-25 NOTE — ED NOTES
Spoke with Kofi, wife, regarding pt being discharged home. Discussed test results, kofi verbalized understanding.     Fatemeh Grigsby RN  03/25/25 4453